# Patient Record
Sex: FEMALE | Race: WHITE
[De-identification: names, ages, dates, MRNs, and addresses within clinical notes are randomized per-mention and may not be internally consistent; named-entity substitution may affect disease eponyms.]

---

## 2017-06-02 ENCOUNTER — HOSPITAL ENCOUNTER (INPATIENT)
Dept: HOSPITAL 61 - ER | Age: 77
LOS: 11 days | Discharge: NURSING, REHAB OR LONG-TERM CARE | DRG: 682 | End: 2017-06-13
Attending: FAMILY MEDICINE | Admitting: FAMILY MEDICINE
Payer: MEDICARE

## 2017-06-02 VITALS — BODY MASS INDEX: 22.63 KG/M2 | HEIGHT: 60 IN | WEIGHT: 115.25 LBS

## 2017-06-02 VITALS — SYSTOLIC BLOOD PRESSURE: 108 MMHG | DIASTOLIC BLOOD PRESSURE: 43 MMHG

## 2017-06-02 VITALS — DIASTOLIC BLOOD PRESSURE: 46 MMHG | SYSTOLIC BLOOD PRESSURE: 110 MMHG

## 2017-06-02 VITALS — DIASTOLIC BLOOD PRESSURE: 36 MMHG | SYSTOLIC BLOOD PRESSURE: 87 MMHG

## 2017-06-02 DIAGNOSIS — J44.9: ICD-10-CM

## 2017-06-02 DIAGNOSIS — Z80.0: ICD-10-CM

## 2017-06-02 DIAGNOSIS — I08.3: ICD-10-CM

## 2017-06-02 DIAGNOSIS — Z90.710: ICD-10-CM

## 2017-06-02 DIAGNOSIS — I13.0: ICD-10-CM

## 2017-06-02 DIAGNOSIS — E86.0: ICD-10-CM

## 2017-06-02 DIAGNOSIS — H53.8: ICD-10-CM

## 2017-06-02 DIAGNOSIS — F32.9: ICD-10-CM

## 2017-06-02 DIAGNOSIS — J96.00: ICD-10-CM

## 2017-06-02 DIAGNOSIS — K52.9: ICD-10-CM

## 2017-06-02 DIAGNOSIS — Z88.5: ICD-10-CM

## 2017-06-02 DIAGNOSIS — Z90.49: ICD-10-CM

## 2017-06-02 DIAGNOSIS — Z88.1: ICD-10-CM

## 2017-06-02 DIAGNOSIS — I95.9: ICD-10-CM

## 2017-06-02 DIAGNOSIS — Z88.6: ICD-10-CM

## 2017-06-02 DIAGNOSIS — K21.9: ICD-10-CM

## 2017-06-02 DIAGNOSIS — Z91.09: ICD-10-CM

## 2017-06-02 DIAGNOSIS — Z96.651: ICD-10-CM

## 2017-06-02 DIAGNOSIS — I25.119: ICD-10-CM

## 2017-06-02 DIAGNOSIS — I27.2: ICD-10-CM

## 2017-06-02 DIAGNOSIS — I25.2: ICD-10-CM

## 2017-06-02 DIAGNOSIS — Z95.5: ICD-10-CM

## 2017-06-02 DIAGNOSIS — R47.81: ICD-10-CM

## 2017-06-02 DIAGNOSIS — N18.2: ICD-10-CM

## 2017-06-02 DIAGNOSIS — M81.0: ICD-10-CM

## 2017-06-02 DIAGNOSIS — F41.9: ICD-10-CM

## 2017-06-02 DIAGNOSIS — K86.1: ICD-10-CM

## 2017-06-02 DIAGNOSIS — E78.5: ICD-10-CM

## 2017-06-02 DIAGNOSIS — D64.9: ICD-10-CM

## 2017-06-02 DIAGNOSIS — N17.0: Primary | ICD-10-CM

## 2017-06-02 DIAGNOSIS — Z87.891: ICD-10-CM

## 2017-06-02 DIAGNOSIS — Z79.899: ICD-10-CM

## 2017-06-02 DIAGNOSIS — Z82.49: ICD-10-CM

## 2017-06-02 DIAGNOSIS — G90.50: ICD-10-CM

## 2017-06-02 DIAGNOSIS — I50.30: ICD-10-CM

## 2017-06-02 DIAGNOSIS — N39.0: ICD-10-CM

## 2017-06-02 DIAGNOSIS — Z88.8: ICD-10-CM

## 2017-06-02 DIAGNOSIS — K59.00: ICD-10-CM

## 2017-06-02 LAB
ALBUMIN SERPL-MCNC: 3.2 G/DL (ref 3.4–5)
ALP SERPL-CCNC: 157 U/L (ref 46–116)
ALT SERPL-CCNC: 29 U/L (ref 14–59)
ANION GAP SERPL CALC-SCNC: 17 MMOL/L (ref 6–14)
AST SERPL-CCNC: 25 U/L (ref 15–37)
BACTERIA #/AREA URNS HPF: (no result) /HPF
BASOPHILS # BLD AUTO: 0 X10^3/UL (ref 0–0.2)
BASOPHILS NFR BLD: 1 % (ref 0–3)
BILIRUB DIRECT SERPL-MCNC: 0.1 MG/DL (ref 0–0.2)
BILIRUB SERPL-MCNC: 0.4 MG/DL (ref 0.2–1)
BILIRUB UR QL STRIP: NEGATIVE
BUN SERPL-MCNC: 163 MG/DL (ref 7–20)
CALCIUM SERPL-MCNC: 9.1 MG/DL (ref 8.5–10.1)
CHLORIDE SERPL-SCNC: 103 MMOL/L (ref 98–107)
CK SERPL-CCNC: 49 U/L (ref 26–192)
CK SERPL-CCNC: 49 U/L (ref 26–192)
CKMB MASS: 2.2 NG/ML (ref 0–3.6)
CO2 SERPL-SCNC: 19 MMOL/L (ref 21–32)
CREAT SERPL-MCNC: 4.8 MG/DL (ref 0.6–1)
EOSINOPHIL NFR BLD: 3 % (ref 0–3)
ERYTHROCYTE [DISTWIDTH] IN BLOOD BY AUTOMATED COUNT: 14.5 % (ref 11.5–14.5)
GFR SERPLBLD BASED ON 1.73 SQ M-ARVRAT: 8.8 ML/MIN
GLUCOSE SERPL-MCNC: 94 MG/DL (ref 70–99)
GLUCOSE UR STRIP-MCNC: NEGATIVE MG/DL
HCT VFR BLD CALC: 39.7 % (ref 36–47)
HGB BLD-MCNC: 13.1 G/DL (ref 12–15.5)
LYMPHOCYTES # BLD: 1.2 X10^3/UL (ref 1–4.8)
LYMPHOCYTES NFR BLD AUTO: 20 % (ref 24–48)
MAGNESIUM SERPL-MCNC: 2.1 MG/DL (ref 1.8–2.4)
MCH RBC QN AUTO: 31 PG (ref 25–35)
MCHC RBC AUTO-ENTMCNC: 33 G/DL (ref 31–37)
MCV RBC AUTO: 95 FL (ref 79–100)
MONOCYTES NFR BLD: 12 % (ref 0–9)
NEUTROPHILS NFR BLD AUTO: 65 % (ref 31–73)
NITRITE UR QL STRIP: NEGATIVE
PH UR STRIP: 5 [PH]
PLATELET # BLD AUTO: 145 X10^3/UL (ref 140–400)
POTASSIUM SERPL-SCNC: 4.7 MMOL/L (ref 3.5–5.1)
PROT SERPL-MCNC: 6.9 G/DL (ref 6.4–8.2)
PROT UR STRIP-MCNC: NEGATIVE MG/DL
RBC # BLD AUTO: 4.18 X10^6/UL (ref 3.5–5.4)
RBC #/AREA URNS HPF: (no result) /HPF (ref 0–2)
SODIUM SERPL-SCNC: 139 MMOL/L (ref 136–145)
SP GR UR STRIP: 1.01
SQUAMOUS #/AREA URNS LPF: (no result) /LPF
UROBILINOGEN UR-MCNC: 0.2 MG/DL
WBC # BLD AUTO: 5.9 X10^3/UL (ref 4–11)

## 2017-06-02 PROCEDURE — 76705 ECHO EXAM OF ABDOMEN: CPT

## 2017-06-02 PROCEDURE — 81001 URINALYSIS AUTO W/SCOPE: CPT

## 2017-06-02 PROCEDURE — 83880 ASSAY OF NATRIURETIC PEPTIDE: CPT

## 2017-06-02 PROCEDURE — 88305 TISSUE EXAM BY PATHOLOGIST: CPT

## 2017-06-02 PROCEDURE — 71010: CPT

## 2017-06-02 PROCEDURE — 73502 X-RAY EXAM HIP UNI 2-3 VIEWS: CPT

## 2017-06-02 PROCEDURE — 80061 LIPID PANEL: CPT

## 2017-06-02 PROCEDURE — 93017 CV STRESS TEST TRACING ONLY: CPT

## 2017-06-02 PROCEDURE — 96374 THER/PROPH/DIAG INJ IV PUSH: CPT

## 2017-06-02 PROCEDURE — 83735 ASSAY OF MAGNESIUM: CPT

## 2017-06-02 PROCEDURE — 96360 HYDRATION IV INFUSION INIT: CPT

## 2017-06-02 PROCEDURE — 83690 ASSAY OF LIPASE: CPT

## 2017-06-02 PROCEDURE — A9500 TC99M SESTAMIBI: HCPCS

## 2017-06-02 PROCEDURE — 96375 TX/PRO/DX INJ NEW DRUG ADDON: CPT

## 2017-06-02 PROCEDURE — 82553 CREATINE MB FRACTION: CPT

## 2017-06-02 PROCEDURE — 87186 SC STD MICRODIL/AGAR DIL: CPT

## 2017-06-02 PROCEDURE — 78452 HT MUSCLE IMAGE SPECT MULT: CPT

## 2017-06-02 PROCEDURE — 36598 INJ W/FLUOR EVAL CV DEVICE: CPT

## 2017-06-02 PROCEDURE — 36415 COLL VENOUS BLD VENIPUNCTURE: CPT

## 2017-06-02 PROCEDURE — 85651 RBC SED RATE NONAUTOMATED: CPT

## 2017-06-02 PROCEDURE — 93005 ELECTROCARDIOGRAM TRACING: CPT

## 2017-06-02 PROCEDURE — A9585 GADOBUTROL INJECTION: HCPCS

## 2017-06-02 PROCEDURE — 85027 COMPLETE CBC AUTOMATED: CPT

## 2017-06-02 PROCEDURE — 87086 URINE CULTURE/COLONY COUNT: CPT

## 2017-06-02 PROCEDURE — 82550 ASSAY OF CK (CPK): CPT

## 2017-06-02 PROCEDURE — 80076 HEPATIC FUNCTION PANEL: CPT

## 2017-06-02 PROCEDURE — 93306 TTE W/DOPPLER COMPLETE: CPT

## 2017-06-02 PROCEDURE — 84484 ASSAY OF TROPONIN QUANT: CPT

## 2017-06-02 PROCEDURE — 96376 TX/PRO/DX INJ SAME DRUG ADON: CPT

## 2017-06-02 PROCEDURE — 80048 BASIC METABOLIC PNL TOTAL CA: CPT

## 2017-06-02 RX ADMIN — BACITRACIN SCH MLS/HR: 5000 INJECTION, POWDER, FOR SOLUTION INTRAMUSCULAR at 18:08

## 2017-06-02 RX ADMIN — ROPINIROLE HYDROCHLORIDE SCH MG: 0.25 TABLET, FILM COATED ORAL at 21:04

## 2017-06-02 RX ADMIN — ATENOLOL SCH MG: 25 TABLET ORAL at 21:05

## 2017-06-02 RX ADMIN — SODIUM CHLORIDE SCH MLS/HR: 450 INJECTION, SOLUTION INTRAVENOUS at 21:05

## 2017-06-02 NOTE — PHYS DOC
Past Medical History


Past Medical History:  CAD, Heart Disease, MI, Other


Past Surgical History:  Cholecystectomy, Hysterectomy, Knee Replacement, Lumbar 

Laminectomy, Tonsillectomy, Other


Additional Past Surgical Histo:  HEMORRHOIDECTOMY,SHOULDER,HERNIA, CARDIAC 

STENTS


Alcohol Use:  None


Drug Use:  None





Adult General


Chief Complaint


Chief Complaint:  CHEST PAIN





HPI


HPI





Patient is a pleasant 77-year-old female with a history of heart disease prior 

MI in 2014 with prior stenting history of hypertension hyperlipidemia who 

presents with chest pain that began about 2 hours prior to arrival. Patient has 

a history from was described of angina it is episodic in nature primarily in 

the center of her chest with radiation to the right and left breast to the 

right and left shoulders bilaterally. It does not make her short of breath but 

it makes her anxious. She does describe no nausea no vomiting, diarrhea, cough, 

chills, or URI symptoms. She does say that the pain is better with sublingual 

nitroglycerin which he took 2 of today to help with her symptoms. There is 

nothing new about these symptoms other than the fact that is more severe and 

longer lasting than normal. While at the MRI machine today getting her hip 

evaluated patient began having another episode. Although she is on oral 

oxycodone and fentanyl via patch patient is still having 7 of 10 pain although 

at presentation she seems very sedated with slurred speech secondary to 

narcotic interventions.


Differential diagnosis for chest pain: Pericarditis, myocarditis, endocarditis, 

pneumothorax, pneumonia, aortic dissection, esophageal spasm, esophagitis, 

peptic ulcer disease, acute coronary syndrome, mediastinitis, Boerhaave syndrome

, musculoskeletal chest wall pain, costochondritis, intercostal strain, rib 

fracture, pulmonary contusion, pneumonitis, pleural effusion, pericardial 

effusion, pericardial tamponode, and pleurisy. Considered upon arrival at this 

point it is likely unstable angina given symptoms occurring at rest and not 

changed in frequency and duration.


Patient EKG, chest x-ray, d-dimer, cardiac enzymes, CBC, CMP, IV fluids, 

aspirins were given my EMS in route nitrates also given by patient or to 

arrival.





Care physician is Dr. JIE SPAIN





Incidentally patient also had large lesion on her right hip that is concerning 

is variegated large one to 2 cm with erythema around it but is discolored 

variegated appearance been present for months.





Review of Systems


Review of Systems





Constitutional: Denies fever or chills []


Eyes: Denies change in visual acuity, redness, or eye pain []


HENT: Denies nasal congestion or sore throat []


Respiratory: Denies cough or shortness of breath []


Cardiovascular: No additional information not addressed in HPI []


GI: Denies abdominal pain, nausea, vomiting, bloody stools or diarrhea []


: Denies dysuria or hematuria []


Musculoskeletal: Denies back pain or joint pain []


Integument: No large lesion black in nature over the right hip.


Neurologic: Denies headache, she does complain of generalized weakness


Endocrine: Denies polyuria or polydipsia []





Current Medications


Current Medications





Current Medications








 Medications


  (Trade)  Dose


 Ordered  Sig/Laura  Start Time


 Stop Time Status Last Admin


Dose Admin


 


 Fentanyl Citrate


  (Fentanyl 2ml


 Vial)  50 mcg  PRN Q2HR  PRN  17 16:45


 6/3/17 16:44   


 


 


 Nitroglycerin


  (Nitrostat)  0.4 mg  PRN Q5MIN  PRN  17 16:45


 6/3/17 16:44   


 


 


 Nitroglycerin/


 Dextrose  250 ml @ 3


 mls/hr  1X  ONCE  17 16:15


 17 03:34   


 


 


 Ondansetron HCl


  (Zofran)  4 mg  PRN Q8HRS  PRN  17 16:45


 6/3/17 16:44   


 


 


 Sodium Chloride  1,000 ml @ 


 100 mls/hr  Q10H  17 16:43


 6/3/17 16:42   


 


 


 Sodium Chloride


  (Normal Saline


 Flush)  10 ml  QSHIFT  PRN  17 16:15


     


 











Allergies


Allergies





Allergies








Coded Allergies Type Severity Reaction Last Updated Verified


 


  diphenhydramine Allergy Severe Shortness of Air 16 Yes


 


  NSAIDS (Non-Steroidal Anti-Inflamma Allergy Intermediate All except Feldene 1/

5/15 Yes


 


  Pentazocine Lactate Allergy Intermediate Blood pressure increase, chest pain, 

heart races, itching. 1/5/15 Yes


 


  Trimethobenzamide HCl Allergy Intermediate Severe muscle spasms 1/5/15 Yes


 


  acetaminophen Allergy Intermediate Shallow breathing, slow heart rate, 

stomach pain 1/5/15 Yes


 


  adhesive Allergy Intermediate rash--"will explain." 1/5/15 Yes


 


  butorphanol tartrate Allergy Intermediate Hallucinations, HA, anxiety, rash 1/

5/15 Yes


 


  carisoprodol Allergy Intermediate Fainting, fast heart rate, light-headed 1/5/

15 Yes


 


  chlorzoxazone Allergy Intermediate Nausea, dizzy, rash, feels like I might 

pass out 1/5/15 Yes


 


  codeine Allergy Intermediate Shallow breathing, vomiting, severe HA, 

bradycardia, itching 1/5/15 Yes


 


  codeine phosphate Allergy Intermediate Shallow breathing, slow heart rate, 

stomach pain 1/5/15 Yes


 


  cyclobenzaprine HCl Allergy Intermediate Chest pain,HA, fast heart rate, 

nervous 1/5/15 Yes


 


  erythromycin base Allergy Intermediate Fainting, fast/irreg HR, rash, 

itiching 1/5/15 Yes


 


  hydromorphone HCl Allergy Intermediate Apneic 1/5/15 Yes


 


  iodine Allergy Intermediate Itching, skin rash, hives 1/5/15 Yes


 


  ketorolac tromethamine Allergy Intermediate Chest pain, sudden severe HA 1/5/

15 Yes


 


  levofloxacin Allergy Intermediate Severe dizziness, heart races, itching, 

stomach pain 1/5/15 Yes


 


  midazolam HCl Allergy Intermediate Slow heart rate, affects bld pressure, 

mild rash 1/5/15 Yes


 


  morphine Allergy Intermediate apneic 1/5/15 Yes


 


  nalbuphine HCl Allergy Intermediate Tachycardia, confusion, hallucinations, 

nervous 1/5/15 Yes


 


  I S O L A T I O N *CONTACT* Allergy Unknown  16 Yes











Physical Exam


Physical Exam





Constitutional: Patient mildly cachectic although well-developed there is some 

muscle wasting noted. Patient has poor dentition dry mucous membranes and is 

slurring her speech likely secondary to narcotics are on board.


HENT: Normocephalic, atraumatic, bilateral external ears normal, dry mucous 

membranes


Eyes: PERRLA, EOMI, conjunctiva normal, no discharge. [] 


Neck: Normal range of motion, no tenderness, supple, no stridor. [] 


Cardiovascular:Heart rate regular rhythm, no murmur []


Lungs & Thorax:  Bilateral breath sounds clear to auscultation []


Abdomen: Bowel sounds normal, soft, no tenderness, no masses, no pulsatile 

masses. [] 


Skin: As enlarged 1-2 cm enlarged dark irrigated brownish-black lesions that 

have mild erythema and induration surrounding them


Back: No tenderness, no CVA tenderness. [] 


Extremities: No tenderness, no cyanosis, no clubbing, ROM intact, no edema. [] 


Neurologic: Alert and oriented X 3, normal motor function, normal sensory 

function, no focal deficits noted. []


Psychologic: Affect normal, judgement normal, mood normal. []





Current Patient Data


Vital Signs





 Vital Signs








  Date Time  Temp Pulse Resp B/P (MAP) Pulse Ox O2 Delivery O2 Flow Rate FiO2


 


17 15:55 97.5 55 18 108/44 (65) 98 Nasal Cannula 2.0 





 97.5       








Lab Values





 Laboratory Tests








Test


  17


16:25 17


16:34 17


17:35


 


White Blood Count


  5.9 x10^3/uL


(4.0-11.0) 


  


 


 


Red Blood Count


  4.18 x10^6/uL


(3.50-5.40) 


  


 


 


Hemoglobin


  13.1 g/dL


(12.0-15.5) 


  


 


 


Hematocrit


  39.7 %


(36.0-47.0) 


  


 


 


Mean Corpuscular Volume


  95 fL ()


  


  


 


 


Mean Corpuscular Hemoglobin 31 pg (25-35)    


 


Mean Corpuscular Hemoglobin


Concent 33 g/dL


(31-37) 


  


 


 


Red Cell Distribution Width


  14.5 %


(11.5-14.5) 


  


 


 


Platelet Count


  145 x10^3/uL


(140-400) 


  


 


 


Neutrophils (%) (Auto) 65 % (31-73)    


 


Lymphocytes (%) (Auto) 20 % (24-48)  L  


 


Monocytes (%) (Auto) 12 % (0-9)  H  


 


Eosinophils (%) (Auto) 3 % (0-3)    


 


Basophils (%) (Auto) 1 % (0-3)    


 


Neutrophils # (Auto)


  3.9 x10^3uL


(1.8-7.7) 


  


 


 


Lymphocytes # (Auto)


  1.2 x10^3/uL


(1.0-4.8) 


  


 


 


Monocytes # (Auto)


  0.7 x10^3/uL


(0.0-1.1) 


  


 


 


Eosinophils # (Auto)


  0.1 x10^3/uL


(0.0-0.7) 


  


 


 


Basophils # (Auto)


  0.0 x10^3/uL


(0.0-0.2) 


  


 


 


Sodium Level


  139 mmol/L


(136-145) 


  


 


 


Potassium Level


  4.7 mmol/L


(3.5-5.1) 


  


 


 


Chloride Level


  103 mmol/L


() 


  


 


 


Carbon Dioxide Level


  19 mmol/L


(21-32)  L 


  


 


 


Anion Gap 17 (6-14)  H  


 


Blood Urea Nitrogen


  163 mg/dL


(7-20)  H 


  


 


 


Creatinine


  4.8 mg/dL


(0.6-1.0)  H 


  


 


 


Estimated GFR


(Cockcroft-Gault) 8.8  


  


  


 


 


Glucose Level


  94 mg/dL


(70-99) 


  


 


 


Calcium Level


  9.1 mg/dL


(8.5-10.1) 


  


 


 


Magnesium Level


  2.1 mg/dL


(1.8-2.4) 


  


 


 


Total Bilirubin


  0.4 mg/dL


(0.2-1.0) 


  


 


 


Direct Bilirubin


  0.1 mg/dL


(0.0-0.2) 


  


 


 


Aspartate Amino Transferase


(AST) 25 U/L (15-37)


  


  


 


 


Alanine Aminotransferase (ALT)


  29 U/L (14-59)


  


  


 


 


Alkaline Phosphatase


  157 U/L


()  H 


  


 


 


Creatine Kinase


  49 U/L


() 


  


 


 


Creatine Kinase MB (Mass)


  2.2 ng/mL


(0.0-3.6) 


  


 


 


Creatine Kinase MB Relative


Index  % (0-4)  


  


  


 


 


Troponin I Quantitative


  < 0.017 ng/mL


(0.000-0.055) 


  


 


 


NT-Pro-B-Type Natriuretic


Peptide 1252 pg/mL


(0-449)  H 


  


 


 


Total Protein


  6.9 g/dL


(6.4-8.2) 


  


 


 


Albumin


  3.2 g/dL


(3.4-5.0)  L 


  


 


 


Lipase


  599 U/L


()  H 


  


 


 


POC Troponin I


  


  0.01 ng/ml


(<0.08) 


 


 


Urine Collection Type   Unknown  


 


Urine Color   Yellow  


 


Urine Clarity   Clear  


 


Urine pH   5.0  


 


Urine Specific Gravity   1.015  


 


Urine Protein


  


  


  Negative mg/dL


(NEG-TRACE)


 


Urine Glucose (UA)


  


  


  Negative mg/dL


(NEG)


 


Urine Ketones (Stick)


  


  


  Negative mg/dL


(NEG)


 


Urine Blood


  


  


  Negative (NEG)


 


 


Urine Nitrite


  


  


  Negative (NEG)


 


 


Urine Bilirubin


  


  


  Negative (NEG)


 


 


Urine Urobilinogen Dipstick


  


  


  0.2 mg/dL (0.2


mg/dL)


 


Urine Leukocyte Esterase   Small (NEG)  


 


Urine RBC


  


  


  1-2 /HPF (0-2)


 


 


Urine WBC


  


  


  11-20 /HPF


(0-4)


 


Urine Squamous Epithelial


Cells 


  


  Few /LPF  


 


 


Urine Bacteria


  


  


  Few /HPF


(0-FEW)


 


Urine Hyaline Casts


  


  


  Occasional


/HPF





 Laboratory Tests


17 16:25








 Laboratory Tests


17 16:25











EKG


EKG


[] KG timed 1553 2017 patient's EKG demonstrated heart rate of 55 sinus 

bradycardia there is marked movement artifact secondary to find motor and 

patient the patient has a Q-wave in the inferior leads in V1 and V2 which is 

not new and old there is also a left ventricular bundle branch block with 

intraventricular conduction delay noted on old EKGs there is left axis 

deviation as well. There are no new changes to this EKG.





Radiology/Procedures


Radiology/Procedures


[] [facility]


 [address] [place] [address]


 [phone]


 


 IMAGING REPORT





 Signed





PATIENT: DELON MARAVILLA ACCOUNT: [id number] MRN#: [id number]


: 1940 LOCATION: [address] [place] [address] AGE: 76


SEX: F EXAM DT: 16 ACCESSION#: [id number]


STATUS: ADM IN ORD. PHYSICIAN: CALVIN NAVARRO MD 


REASON: CHF


PROCEDURE: ECHOCARDIOGRAM











--------------- APPROVED REPORT --------------








EXAM: Two-dimensional and M-mode echocardiogram with Doppler and color Doppler.





Other Information 


Quality : Good   





INDICATION


Congestive Heart Failure 





2D DIMENSIONS 


RVDd   2.8 (2.9-3.5cm)   Left Atrium(2D)   3.0 (1.6-4.0cm)


IVSd   1.0 (0.7-1.1cm)   Aortic Root(2D)   1.9 (2.0-3.7cm)


LVDd   3.5 (3.9-5.9cm)   LVOT Diameter   1.9 (1.8-2.4cm)


PWd   0.9 (0.7-1.1cm)   LVDs      2.2 (2.5-4.0cm)


FS (%)    30.0 %      SV      33.3 ml


LVEF(%)   60.0 (>50%)         





Aortic Valve


AoV Peak Yifan.   155.2cm/s   AoV VTI      37.9cm


AO Peak GR.   9.6mmHg      LVOT Peak Yifan.   123.9cm/s


AO Mean GR.   5mmHg      NANCY (VMAX)   2.34cm2


NANCY   (VTI)   2.50cm2      AI P 1/2 Time   376ms





Mitral Valve


MV E Velocity   108.5cm/s   MV DECEL TIME   124ms


MV A Velocity   95.5cm/s   E/A  Ratio   1.1





Tricuspid Valve


TR P. Velocity   356cm/s   RAP ESTIMATE   10mmHg


TR Peak Gr.   51mmHg   RVSP      61mmHg





Pulmonary Vein


S1 Velocity   57.6cm/s   D2 Velocity   44.7cm/s


PVa duration   145msec            





 LEFT VENTRICLE 


The left ventricle is mildly dilated There is mild concentric left ventricular 

hypertrophy The left ventricular systolic function is normal and the ejection 

fraction is within normal range. The Ejection Fraction is 60%. There is mild 

hypokinesis in the apical inferior wall. Transmitral Doppler flow pattern is 

Grade II-pseudonormal filling dynamics. Left ventricular diastolic dysfunction





 RIGHT VENTRICLE 


The right ventricle is normal size. Mild right ventricular hypertrophy The 

right ventricular systolic function is normal.





 ATRIA 


The left atrium size is dilated The right atrium size is dilated The 

interatrial septum is intact with no evidence for an atrial septal defect or 

patent foramen ovale as noted on 2-D or Doppler imaging.





 AORTIC VALVE 


The aortic valve is calcified but opens well. Doppler and Color Flow revealed 

moderate to severe aortic regurgitation. There is no significant aortic 

valvular stenosis.





 MITRAL VALVE 


The mitral valve is normal in structure There is no evidence of mitral valve 

prolapse. There is no mitral valve stenosis. Doppler and Color-flow revealed 

mild to moderate mitral regurgitation.





 TRICUSPID VALVE 


The tricuspid valve is normal in structure and function. Doppler and Color Flow 

revealed moderate tricuspid regurgitation. There is moderate to severe 

pulmonary hypertension. The PA pressure was estimated at 61 mmHg. There is no 

tricuspid valve stenosis.





 PULMONIC VALVE 


The pulmonary valve is normal in structure Doppler and Color Flow revealed mild 

pulmonic valvular regurgitation. There is no pulmonic valvular stenosis.





 GREAT VESSELS 


The aortic root is normal in size. The ascending aorta is normal in size. The 

IVC is normal in size and collapses >50% with inspiration.





 PERICARDIAL EFFUSION 


There is no evidence of significant pericardial effusion.





Critical Notification


Critical Value: No





<Conclusion>


The left ventricular systolic function is normal and the ejection fraction is 

within normal range.


The Ejection Fraction is 60%.


Transmitral Doppler flow pattern is Grade II-pseudonormal filling dynamics. 

Left ventricular diastolic dysfunction


Mild right ventricular hypertrophy


The left atrium size is dilated


The right atrium size is dilated


Doppler and Color Flow revealed moderate to severe aortic regurgitation.


The aortic valve is calcified but opens well.


Doppler and Color-flow revealed mild to moderate mitral regurgitation.


Doppler and Color Flow revealed moderate tricuspid regurgitation.


There is moderate to severe pulmonary hypertension.


The PA pressure was estimated at 61 mmHg.


Doppler and Color Flow revealed mild pulmonic valvular regurgitation.


There is no evidence of significant pericardial effusion.














DICTATED and SIGNED BY:     CALVIN NAVARRO MD


DATE:     12/10/16 1426





CC: KRISTIN CHEN MD; CALVIN NAVARRO MD ~





Course & Med Decision Making


Course & Med Decision Making


Pertinent Labs and Imaging studies reviewed. (See chart for details)





Initial presentation Differential diagnosis for chest pain: Pericarditis, 

myocarditis, endocarditis, pneumothorax, pneumonia, aortic dissection, 

esophageal spasm, esophagitis, peptic ulcer disease, acute coronary syndrome, 

mediastinitis, Boerhaave syndrome, musculoskeletal chest wall pain, 

costochondritis, intercostal strain, rib fracture, pulmonary contusion, 

pneumonitis, pleural effusion, pericardial effusion, pericardial tamponode, and 

pleurisy





Disposition also demonstrated a small lesion on her right hip which is very 

concerning for melanoma.





Consultant note: Internal medicine





Consultant called at of the service  4:30 PM


Consult called back at  o435 p.m.





Discussed the case I presented and they agreed with admission. Time of 

acceptance for 435 PM ask for inpatient admission cardiology evaluation and 

consultation. 





Patient's pain is intermittent and comes and goes patient is mildly 

hypertensive during her evaluation here in the emergency department although 

she has fentanyl on which is likely causing her hypotension the nitrates may be 

doing so. Given her continued discomfort she's been bitten to the hospital 

under the impression that she has angina. This is chronic for her not a new 

nature but given her symptoms I wanted to make sure that cardiology was aware 

of her presence. Also on her findings were a elevated BUN/creatinine a 

significant value of 4.8 this time I have no old findings to compare this to 

but I worried that this on top her chest pain is concerning. It is also noted 

that her pancreas may be inflamed and irritated from some unknown reason 

perhaps hyperlipidemia, as her lipase is 599 today which would also explain why 

she has abdominal pain when is in not responsive to nitrates as normal.  Her 

troponin initially on arrival was 0.01 and measured 0.017 which both within 

normal limits. I believe that an ultrasound of her abdomen would be appropriate 

to rule out other sources of abdominal catastrophe causing pancreatic tightness 

like cholelithiasis or cholecystitis. Her LFTs are not elevated white count. I 

concern also would be possible alcohol consumption although there is no history 

of acid of alcoholism





With a course this patient's evaluation she was comfortable and resting quietly 

mildly hypotensive responsive to fluids. Denies still concerned that possibly 

fluid retention with elevated pancreatic enzymes may be the cause of her 

abdominal pain chest pain she described earlier. Dr. Chen this patient 

approximately 6:10 PM to let him know about the other findings during her 

course of evaluation and my plan of treatment.  I will cancel her diet and keep 

her NPO and encourage GI valuation. 





 impression: Abdominal pain, likely pancreatitis, chest pain unclear etiology 

likely angina, hypotension likely secondary to medication reaction. Renal 

failure





Disposition: Admission to the hospital under internal medicine service Dr. JIE SPAIN with cardiology and GI consultation.





Dragon Disclaimer


Dragon Disclaimer


This electronic medical record was generated, in whole or in part, using a 

voice recognition dictation system.





Departure


Departure


Impression:  


 Primary Impression:  


 Angina at rest


 Additional Impressions:  


 Chest pain


 Melanoma


 Acute renal failure


 Pancreatitis


Disposition:   ADMITTED AS INPATIENT


Admitting Physician:  Kristin Chen


Referrals:  


KRISTIN CHEN MD (PCP)





Problem Qualifiers











DAVID ROWE MD 2017 16:11

## 2017-06-02 NOTE — RAD
INDICATION: pt c/o chest pain today, elevated lipase

 

COMPARISON: None.

 

TECHNIQUE: Grayscale and color ultrasound images obtained through the 

abdomen.

 

FINDINGS:

 

Aorta/IVC: Incomplete visualization.

Pancreas: Not well seen

Liver: Mild prominence of intrahepatic bile ducts

Gallbladder: Removed

Common Bile Duct: 11 mm

Right Kidney: No hydronephrosis.

 

 

IMPRESSION: 

 

Postcholecystectomy with prominent intrahepatic and extrahepatic bile 

ducts. This is commonly seen postcholecystectomy but would correlate with 

symptoms and lab markers to ensure there is not a distal obstructive 

process.

 

Electronically signed by: Art Wright MD (6/2/2017 7:16 PM)

## 2017-06-02 NOTE — ACF
Admission Forms Criteria





                            ANGINA





Clinical Indications for Admission to Inpatient Care





                                                                    (Place 'X' 

for any and all applicable criteria):





Admission is indicated for suspected angina (e.g, chest pain pattern, angina-

equivalent symptom, or other finding suggesting 


unstable angina) with ANY ONE of the following(1)(2)(3)(4)(5): 


[X]I.   Angina needing acute intervention as indicated by ALL of the following (

11)(12):


        [X]a)  Unstable angina is present as indicated by angina that is ANY ONE

 of the following:


                [ ]i)     New onset


                [ ]ii)    Nocturnal


                [X]iii)   Prolonged at rest 


                [ ]iv)   Progressive


         [X]b)  Angina warrants acute intervention as indicated by ANY ONE of 

the following:


                [ ]i)     Recurrent angina (e.g, not responding as previously 

to treatment)


                [ ]ii)     Angina at rest or with low-level activities despite 

initial medical therapy


                [ ]iii)    New or presumably new ST-segment depression on ECG


                [ ]iv)    Signs or symptoms of heart failure (eg, dyspnea, 

pulmonary edema)


                [ ]v)     New or worsening mitral regurgitation


                [ ]vi)    Hemodynamic instability


                [ ]vii)    Dangerous arrhythmia (eg, sustained ventricular 

tachycardia)          


                [ ]viii)   History of percutaneous coronary intervention within 

6 months          


                [ ]ix)    History of coronary artery bypass graft surgery


                [X]x)    SAVANA risk score of 2 or greater[A]


                [ ]xi)    History of Diabetes(14)


                [ ]xii)   High-risk cardiac ischemia findings on noninvasive 

testing (e.g, echocardiogram,


                          treadmill testing, nuclear scan)


                [ ]xiii)  Chronic renal insufficiency (ie, estimated GFR less 

than 60 mL/min/1.732m)


                [ ]xiv)   Left ventricular ejection fraction less than 40%


[ ]II.   Evidence of MI (e.g, cardiac biomarkers positive, ST-segment elevation 

on ECG). Also use Myocardial Infarction. 





                                                                               

                   


Extended stay beyond goal length of stay may be needed for (1)(26):


[ ]a)  Intravascular procedural complications such as acute vessel closure, 

stent malposition, or vessel dissection(27) 


[ ]b)  Extravascular procedural complications such as retroperitoneal hematoma, 

pericardial effusion, or cardiac tamponade 


[ ]c)  Entry site complications causing bleeding, hematoma, or distal ischemia 

and requiring ongoing monitoring, 


        surgical repair, or surgical  thrombectomy(28) 


[ ]d)  Heart failure            


[ ]e)  Dangerous arrhythmia           


[ ]f)   Hemodynamic instability with persisting symptoms after intensive 

medical management, or recurring severe 


        prolonged symptoms


[ ]g)  Postprocedural myocardial infarction or acute renal failure











The original [facility] [employer] content created by [employer] [employer] has been revised.


The portions of the content which have been revised are identified through the 

use of italic text or in bold, and [facility] 


has neither reviewed nor approved the modified material. All other unmodified 

content is copyright  [facility].





Please see references footnoted in the original [employer] [facility] edition 

2016





Admission Criteria Met?:  Yes











LEANDRO BURTON Jun 2, 2017 17:09

## 2017-06-02 NOTE — RAD
Indication weakness shortness of breath chest pain.



A single view of the chest was obtained. Comparison is made to an examination

12/13/2016.



There are chronic interstitial changes. Heart size is unchanged. There is no

gross congestive heart failure. A focal process is not seen. Significant

pleural fluid is not present and there is no pneumothorax. A left Port-A-Cath

is noted. There has not been a significant change compared to the previous

exam.



IMPRESSION: Chronic changes. No acute finding. No significant change

## 2017-06-02 NOTE — EKG
[facility]

              [address] [place] [address]

Test Date:    2017               Test Time:    15:53:39

Pat Name:     DELON MARAVILLA              Department:   

Patient ID:   [id number]           Room:          

Gender:       F                        Technician:   

:          [id number]               Requested By: DAVID ROWE

Order Number: [id number]            Reading MD:   Maynor Mendes

                                 Measurements

Intervals                              Axis          

Rate:         55                       P:            66

MS:           180                      QRS:          -47

QRSD:         166                      T:            90

QT:           484                                    

QTc:          465                                    

                           Interpretive Statements

SINUS RHYTHM

LEFT ATRIAL ABNORMALITY

ABNORMAL LEFT AXIS DEVIATION

NON SPECIFIC INTRAVENTRICULAR BLOCK

QRS(T) CONTOUR ABNORMALITY

CONSISTENT WITH ANTEROSEPTAL INFARCT

AGE UNDETERMINED

RI6.01          Unconfirmed report



Electronically Signed On 2017 9:34:01 CDT by Maynor Mendes

## 2017-06-03 VITALS — SYSTOLIC BLOOD PRESSURE: 128 MMHG | DIASTOLIC BLOOD PRESSURE: 45 MMHG

## 2017-06-03 VITALS — SYSTOLIC BLOOD PRESSURE: 104 MMHG | DIASTOLIC BLOOD PRESSURE: 33 MMHG

## 2017-06-03 VITALS — SYSTOLIC BLOOD PRESSURE: 82 MMHG | DIASTOLIC BLOOD PRESSURE: 41 MMHG

## 2017-06-03 VITALS — SYSTOLIC BLOOD PRESSURE: 104 MMHG | DIASTOLIC BLOOD PRESSURE: 40 MMHG

## 2017-06-03 VITALS — DIASTOLIC BLOOD PRESSURE: 31 MMHG | SYSTOLIC BLOOD PRESSURE: 91 MMHG

## 2017-06-03 VITALS — SYSTOLIC BLOOD PRESSURE: 98 MMHG | DIASTOLIC BLOOD PRESSURE: 30 MMHG

## 2017-06-03 LAB
ANION GAP SERPL CALC-SCNC: 12 MMOL/L (ref 6–14)
BUN SERPL-MCNC: 142 MG/DL (ref 7–20)
CALCIUM SERPL-MCNC: 8.4 MG/DL (ref 8.5–10.1)
CHLORIDE SERPL-SCNC: 110 MMOL/L (ref 98–107)
CO2 SERPL-SCNC: 20 MMOL/L (ref 21–32)
CREAT SERPL-MCNC: 3.7 MG/DL (ref 0.6–1)
GFR SERPLBLD BASED ON 1.73 SQ M-ARVRAT: 11.8 ML/MIN
GLUCOSE SERPL-MCNC: 101 MG/DL (ref 70–99)
POTASSIUM SERPL-SCNC: 4.1 MMOL/L (ref 3.5–5.1)
SODIUM SERPL-SCNC: 142 MMOL/L (ref 136–145)

## 2017-06-03 RX ADMIN — ATENOLOL SCH MG: 25 TABLET ORAL at 20:35

## 2017-06-03 RX ADMIN — ISOSORBIDE MONONITRATE SCH MG: 30 TABLET, EXTENDED RELEASE ORAL at 09:00

## 2017-06-03 RX ADMIN — OXYCODONE HYDROCHLORIDE AND ACETAMINOPHEN PRN TAB: 10; 325 TABLET ORAL at 09:19

## 2017-06-03 RX ADMIN — APIXABAN SCH MG: 2.5 TABLET, FILM COATED ORAL at 20:37

## 2017-06-03 RX ADMIN — LISINOPRIL SCH MG: 10 TABLET ORAL at 09:00

## 2017-06-03 RX ADMIN — ROPINIROLE HYDROCHLORIDE SCH MG: 0.25 TABLET, FILM COATED ORAL at 20:37

## 2017-06-03 RX ADMIN — AMIODARONE HYDROCHLORIDE SCH MG: 200 TABLET ORAL at 09:19

## 2017-06-03 RX ADMIN — OXYCODONE HYDROCHLORIDE AND ACETAMINOPHEN PRN TAB: 10; 325 TABLET ORAL at 10:19

## 2017-06-03 RX ADMIN — APIXABAN SCH MG: 2.5 TABLET, FILM COATED ORAL at 12:46

## 2017-06-03 RX ADMIN — BACITRACIN SCH MLS/HR: 5000 INJECTION, POWDER, FOR SOLUTION INTRAMUSCULAR at 02:43

## 2017-06-03 RX ADMIN — BACITRACIN SCH MLS/HR: 5000 INJECTION, POWDER, FOR SOLUTION INTRAMUSCULAR at 12:40

## 2017-06-03 RX ADMIN — AMIODARONE HYDROCHLORIDE SCH MG: 200 TABLET ORAL at 09:03

## 2017-06-03 RX ADMIN — ATENOLOL SCH MG: 25 TABLET ORAL at 09:00

## 2017-06-03 RX ADMIN — OXYCODONE HYDROCHLORIDE AND ACETAMINOPHEN PRN TAB: 10; 325 TABLET ORAL at 20:37

## 2017-06-03 RX ADMIN — SODIUM CHLORIDE SCH MLS/HR: 450 INJECTION, SOLUTION INTRAVENOUS at 15:38

## 2017-06-03 NOTE — PDOC
GENERAL


General:


see dictated H&P.


Problems:  





VITAL SIGNS


Vital Signs:





Vital Signs








  Date Time  Temp Pulse Resp B/P (MAP) Pulse Ox O2 Delivery O2 Flow Rate FiO2


 


6/3/17 10:45 97.5 54 18 91/31 (51) 95 Room Air  





 97.5       


 


6/3/17 09:19       2.0 











I & O


I & O











Intake and Output 


 


 6/3/17





 07:00


 


Intake Total 1658 ml


 


Balance 1658 ml


 


 


 


Intake Oral 0 ml


 


IV Total 1658 ml


 


# Voids 1











ALLERGIES


Allergies:





Allergies








Coded Allergies Type Severity Reaction Last Updated Verified


 


  diphenhydramine Allergy Severe Shortness of Air 6/1/16 Yes


 


  NSAIDS (Non-Steroidal Anti-Inflamma Allergy Intermediate All except Feldene 1/

5/15 Yes


 


  Pentazocine Lactate Allergy Intermediate Blood pressure increase, chest pain, 

heart races, itching. 1/5/15 Yes


 


  Trimethobenzamide HCl Allergy Intermediate Severe muscle spasms 1/5/15 Yes


 


  acetaminophen Allergy Intermediate Shallow breathing, slow heart rate, 

stomach pain 1/5/15 Yes


 


  adhesive Allergy Intermediate rash--"will explain." 1/5/15 Yes


 


  butorphanol tartrate Allergy Intermediate Hallucinations, HA, anxiety, rash 1/

5/15 Yes


 


  carisoprodol Allergy Intermediate Fainting, fast heart rate, light-headed 1/5/

15 Yes


 


  chlorzoxazone Allergy Intermediate Nausea, dizzy, rash, feels like I might 

pass out 1/5/15 Yes


 


  codeine Allergy Intermediate Shallow breathing, vomiting, severe HA, 

bradycardia, itching 1/5/15 Yes


 


  codeine phosphate Allergy Intermediate Shallow breathing, slow heart rate, 

stomach pain 1/5/15 Yes


 


  cyclobenzaprine HCl Allergy Intermediate Chest pain,HA, fast heart rate, 

nervous 1/5/15 Yes


 


  erythromycin base Allergy Intermediate Fainting, fast/irreg HR, rash, 

itiching 1/5/15 Yes


 


  hydromorphone HCl Allergy Intermediate Apneic 1/5/15 Yes


 


  iodine Allergy Intermediate Itching, skin rash, hives 1/5/15 Yes


 


  ketorolac tromethamine Allergy Intermediate Chest pain, sudden severe HA 1/5/

15 Yes


 


  levofloxacin Allergy Intermediate Severe dizziness, heart races, itching, 

stomach pain 1/5/15 Yes


 


  midazolam HCl Allergy Intermediate Slow heart rate, affects bld pressure, 

mild rash 1/5/15 Yes


 


  morphine Allergy Intermediate apneic 1/5/15 Yes


 


  nalbuphine HCl Allergy Intermediate Tachycardia, confusion, hallucinations, 

nervous 1/5/15 Yes


 


  I S O L A T I O N *CONTACT* Allergy Unknown  12/8/16 Yes











MEDS


Medications:





Current Medications








 Medications


  (Trade)  Dose


 Ordered  Sig/Laura  Start Time


 Stop Time Status Last Admin


Dose Admin


 


 Amiodarone HCl


  (Cordarone)  200 mg  DAILY  6/3/17 09:00


    6/3/17 09:19


200 MG


 


 Apixaban


  (Eliquis)  2.5 mg  BID  6/3/17 11:00


     


 


 


 Atenolol


  (Tenormin)  25 mg  BID  6/2/17 21:00


    6/2/17 21:05


25 MG


 


 Fentanyl


  (Duragesic 50mcg/


 Hr Patch)  1 patch  Q3DAYS  6/5/17 09:00


     


 


 


 Fentanyl Citrate


  (Fentanyl 2ml


 Vial)  50 mcg  PRN Q2HR  PRN  6/2/17 16:45


 6/3/17 16:44   


 


 


 Info


  (Anti-Coagulation


 Monitoring By


 Pharmacy)  1 each  PRN DAILY  PRN  6/3/17 10:45


     


 


 


 Isosorbide


 Mononitrate


  (Imdur)  30 mg  DAILY  6/3/17 09:00


     


 


 


 Lisinopril


  (Prinivil)  10 mg  DAILY  6/3/17 09:00


     


 


 


 Nitroglycerin


  (Nitrostat)  0.4 mg  PRN Q5MIN  PRN  6/2/17 20:30


     


 


 


 Nitroglycerin/


 Dextrose  250 ml @ 3


 mls/hr  1X  ONCE  6/2/17 16:15


 6/6/17 03:34   


 


 


 Non-Formulary


 Medication  50 mg  TID  PRN  6/2/17 20:30


   UNV  


 


 


 Ondansetron HCl


  (Zofran Odt)  4 mg  PRN Q6HRS  PRN  6/2/17 20:30


     


 


 


 Ondansetron HCl


  (Zofran)  4 mg  PRN Q8HRS  PRN  6/2/17 16:45


 6/3/17 16:44   


 


 


 Oxycodone/


 Acetaminophen


  (Percocet 10/325)  1 tab  PRN QID  PRN  6/2/17 20:30


    6/3/17 09:19


1 TAB


 


 Rivaroxaban


  (Xarelto)  15 mg  DAILYWSUP  6/3/17 17:00


   UNV  


 


 


 Ropinirole HCl


  (Requip)  0.25 mg  QHS  6/2/17 21:00


    6/2/17 21:04


0.25 MG


 


 Sodium Chloride  1,000 ml @ 


 50 mls/hr  Q20H  6/2/17 20:30


    6/2/17 21:05


50 MLS/HR


 


 Sodium Chloride


  (Normal Saline


 Flush)  10 ml  QSHIFT  PRN  6/2/17 16:15


     


 











LAB


Lab:





Laboratory Tests








Test


  6/2/17


16:25 6/2/17


16:34 6/2/17


17:35 6/2/17


23:58


 


White Blood Count


  5.9 x10^3/uL


(4.0-11.0) 


  


  


 


 


Red Blood Count


  4.18 x10^6/uL


(3.50-5.40) 


  


  


 


 


Hemoglobin


  13.1 g/dL


(12.0-15.5) 


  


  


 


 


Hematocrit


  39.7 %


(36.0-47.0) 


  


  


 


 


Mean Corpuscular Volume 95 fL ()    


 


Mean Corpuscular Hemoglobin 31 pg (25-35)    


 


Mean Corpuscular Hemoglobin


Concent 33 g/dL


(31-37) 


  


  


 


 


Red Cell Distribution Width


  14.5 %


(11.5-14.5) 


  


  


 


 


Platelet Count


  145 x10^3/uL


(140-400) 


  


  


 


 


Neutrophils (%) (Auto) 65 % (31-73)    


 


Lymphocytes (%) (Auto) 20 % (24-48)    


 


Monocytes (%) (Auto) 12 % (0-9)    


 


Eosinophils (%) (Auto) 3 % (0-3)    


 


Basophils (%) (Auto) 1 % (0-3)    


 


Neutrophils # (Auto)


  3.9 x10^3uL


(1.8-7.7) 


  


  


 


 


Lymphocytes # (Auto)


  1.2 x10^3/uL


(1.0-4.8) 


  


  


 


 


Monocytes # (Auto)


  0.7 x10^3/uL


(0.0-1.1) 


  


  


 


 


Eosinophils # (Auto)


  0.1 x10^3/uL


(0.0-0.7) 


  


  


 


 


Basophils # (Auto)


  0.0 x10^3/uL


(0.0-0.2) 


  


  


 


 


Sodium Level


  139 mmol/L


(136-145) 


  


  


 


 


Potassium Level


  4.7 mmol/L


(3.5-5.1) 


  


  


 


 


Chloride Level


  103 mmol/L


() 


  


  


 


 


Carbon Dioxide Level


  19 mmol/L


(21-32) 


  


  


 


 


Anion Gap 17 (6-14)    


 


Blood Urea Nitrogen


  163 mg/dL


(7-20) 


  


  


 


 


Creatinine


  4.8 mg/dL


(0.6-1.0) 


  


  


 


 


Estimated GFR


(Cockcroft-Gault) 8.8 


  


  


  


 


 


Glucose Level


  94 mg/dL


(70-99) 


  


  


 


 


Calcium Level


  9.1 mg/dL


(8.5-10.1) 


  


  


 


 


Magnesium Level


  2.1 mg/dL


(1.8-2.4) 


  


  


 


 


Total Bilirubin


  0.4 mg/dL


(0.2-1.0) 


  


  


 


 


Direct Bilirubin


  0.1 mg/dL


(0.0-0.2) 


  


  


 


 


Aspartate Amino Transf


(AST/SGOT) 25 U/L (15-37) 


  


  


  


 


 


Alanine Aminotransferase


(ALT/SGPT) 29 U/L (14-59) 


  


  


  


 


 


Alkaline Phosphatase


  157 U/L


() 


  


  


 


 


Creatine Kinase


  49 U/L


() 


  


  


 


 


Creatine Kinase MB (Mass)


  2.2 ng/mL


(0.0-3.6) 


  


  


 


 


Creatine Kinase MB Relative


Index  % (0-4) 


  


  


  


 


 


Troponin I Quantitative


  < 0.017 ng/mL


(0.000-0.055) 


  


  < 0.017 ng/mL


(0.000-0.055)


 


NT-Pro-B-Type Natriuretic


Peptide 1252 pg/mL


(0-449) 


  


  


 


 


Total Protein


  6.9 g/dL


(6.4-8.2) 


  


  


 


 


Albumin


  3.2 g/dL


(3.4-5.0) 


  


  


 


 


Lipase


  599 U/L


() 


  


  


 


 


Bedside Troponin I


  


  0.01 ng/ml


(<0.08) 


  


 


 


Urine Collection Type   Unknown  


 


Urine Color   Yellow  


 


Urine Clarity   Clear  


 


Urine pH   5.0  


 


Urine Specific Gravity   1.015  


 


Urine Protein


  


  


  Negative mg/dL


(NEG-TRACE) 


 


 


Urine Glucose (UA)


  


  


  Negative mg/dL


(NEG) 


 


 


Urine Ketones (Stick)


  


  


  Negative mg/dL


(NEG) 


 


 


Urine Blood   Negative (NEG)  


 


Urine Nitrite   Negative (NEG)  


 


Urine Bilirubin   Negative (NEG)  


 


Urine Urobilinogen Dipstick


  


  


  0.2 mg/dL (0.2


mg/dL) 


 


 


Urine Leukocyte Esterase   Small (NEG)  


 


Urine RBC   1-2 /HPF (0-2)  


 


Urine WBC


  


  


  11-20 /HPF


(0-4) 


 


 


Urine Squamous Epithelial


Cells 


  


  Few /LPF 


  


 


 


Urine Bacteria


  


  


  Few /HPF


(0-FEW) 


 


 


Urine Hyaline Casts


  


  


  Occasional


/HPF 


 


 


Test


  6/3/17


04:50 


  


  


 


 


Erythrocyte Sedimentation Rate 7 (0-25)    


 


Sodium Level


  142 mmol/L


(136-145) 


  


  


 


 


Potassium Level


  4.1 mmol/L


(3.5-5.1) 


  


  


 


 


Chloride Level


  110 mmol/L


() 


  


  


 


 


Carbon Dioxide Level


  20 mmol/L


(21-32) 


  


  


 


 


Anion Gap 12 (6-14)    


 


Blood Urea Nitrogen


  142 mg/dL


(7-20) 


  


  


 


 


Creatinine


  3.7 mg/dL


(0.6-1.0) 


  


  


 


 


Estimated GFR


(Cockcroft-Gault) 11.8 


  


  


  


 


 


Glucose Level


  101 mg/dL


(70-99) 


  


  


 


 


Calcium Level


  8.4 mg/dL


(8.5-10.1) 


  


  


 


 


Troponin I Quantitative


  < 0.017 ng/mL


(0.000-0.055) 


  


  


 

















KRISTIN LINDSAY MD Syed 3, 2017 11:37

## 2017-06-03 NOTE — RAD
Indication: Pressure ulcer on the outside of the right hip.



Time of exam 10:26 AM



2 views of the right hip were obtained. Femoral acetabular alignment is

normal. The femoral head and neck are intact. No fractures are seen. There are

soft tissue calcifications noted along the right hip and pelvis. No definite

soft tissue gas is seen. No bony destructive changes are identified.



Impression: No acute abnormality is detected.

## 2017-06-03 NOTE — PDOC
GENERAL


General:


see dictated H&P.


Problems:  





VITAL SIGNS


Vital Signs:





Vital Signs








  Date Time  Temp Pulse Resp B/P (MAP) Pulse Ox O2 Delivery O2 Flow Rate FiO2


 


6/3/17 09:19  51  104/40    


 


6/3/17 09:19     94 Nasal Cannula 2.0 


 


6/3/17 07:55 97.7  18     





 97.7       











I & O


I & O











Intake and Output 


 


 6/3/17





 07:00


 


Intake Total 1658 ml


 


Balance 1658 ml


 


 


 


Intake Oral 0 ml


 


IV Total 1658 ml


 


# Voids 1











ALLERGIES


Allergies:





Allergies








Coded Allergies Type Severity Reaction Last Updated Verified


 


  diphenhydramine Allergy Severe Shortness of Air 6/1/16 Yes


 


  NSAIDS (Non-Steroidal Anti-Inflamma Allergy Intermediate All except Feldene 1/

5/15 Yes


 


  Pentazocine Lactate Allergy Intermediate Blood pressure increase, chest pain, 

heart races, itching. 1/5/15 Yes


 


  Trimethobenzamide HCl Allergy Intermediate Severe muscle spasms 1/5/15 Yes


 


  acetaminophen Allergy Intermediate Shallow breathing, slow heart rate, 

stomach pain 1/5/15 Yes


 


  adhesive Allergy Intermediate rash--"will explain." 1/5/15 Yes


 


  butorphanol tartrate Allergy Intermediate Hallucinations, HA, anxiety, rash 1/

5/15 Yes


 


  carisoprodol Allergy Intermediate Fainting, fast heart rate, light-headed 1/5/

15 Yes


 


  chlorzoxazone Allergy Intermediate Nausea, dizzy, rash, feels like I might 

pass out 1/5/15 Yes


 


  codeine Allergy Intermediate Shallow breathing, vomiting, severe HA, 

bradycardia, itching 1/5/15 Yes


 


  codeine phosphate Allergy Intermediate Shallow breathing, slow heart rate, 

stomach pain 1/5/15 Yes


 


  cyclobenzaprine HCl Allergy Intermediate Chest pain,HA, fast heart rate, 

nervous 1/5/15 Yes


 


  erythromycin base Allergy Intermediate Fainting, fast/irreg HR, rash, 

itiching 1/5/15 Yes


 


  hydromorphone HCl Allergy Intermediate Apneic 1/5/15 Yes


 


  iodine Allergy Intermediate Itching, skin rash, hives 1/5/15 Yes


 


  ketorolac tromethamine Allergy Intermediate Chest pain, sudden severe HA 1/5/

15 Yes


 


  levofloxacin Allergy Intermediate Severe dizziness, heart races, itching, 

stomach pain 1/5/15 Yes


 


  midazolam HCl Allergy Intermediate Slow heart rate, affects bld pressure, 

mild rash 1/5/15 Yes


 


  morphine Allergy Intermediate apneic 1/5/15 Yes


 


  nalbuphine HCl Allergy Intermediate Tachycardia, confusion, hallucinations, 

nervous 1/5/15 Yes


 


  I S O L A T I O N *CONTACT* Allergy Unknown  12/8/16 Yes











MEDS


Medications:





Current Medications








 Medications


  (Trade)  Dose


 Ordered  Sig/Laura  Start Time


 Stop Time Status Last Admin


Dose Admin


 


 Amiodarone HCl


  (Cordarone)  200 mg  DAILY  6/3/17 09:00


    6/3/17 09:19


200 MG


 


 Atenolol


  (Tenormin)  25 mg  BID  6/2/17 21:00


    6/2/17 21:05


25 MG


 


 Fentanyl


  (Duragesic 50mcg/


 Hr Patch)  1 patch  Q3DAYS  6/5/17 09:00


     


 


 


 Fentanyl Citrate


  (Fentanyl 2ml


 Vial)  50 mcg  PRN Q2HR  PRN  6/2/17 16:45


 6/3/17 16:44   


 


 


 Isosorbide


 Mononitrate


  (Imdur)  30 mg  DAILY  6/3/17 09:00


     


 


 


 Lisinopril


  (Prinivil)  10 mg  DAILY  6/3/17 09:00


     


 


 


 Nitroglycerin


  (Nitrostat)  0.4 mg  PRN Q5MIN  PRN  6/2/17 20:30


     


 


 


 Nitroglycerin/


 Dextrose  250 ml @ 3


 mls/hr  1X  ONCE  6/2/17 16:15


 6/6/17 03:34   


 


 


 Non-Formulary


 Medication  50 mg  TID  PRN  6/2/17 20:30


   UNV  


 


 


 Ondansetron HCl


  (Zofran Odt)  4 mg  PRN Q6HRS  PRN  6/2/17 20:30


     


 


 


 Ondansetron HCl


  (Zofran)  4 mg  PRN Q8HRS  PRN  6/2/17 16:45


 6/3/17 16:44   


 


 


 Oxycodone/


 Acetaminophen


  (Percocet 10/325)  1 tab  PRN QID  PRN  6/2/17 20:30


    6/3/17 09:19


1 TAB


 


 Rivaroxaban


  (Xarelto)  15 mg  DAILYWSUP  6/3/17 17:00


   UNV  


 


 


 Ropinirole HCl


  (Requip)  0.25 mg  QHS  6/2/17 21:00


    6/2/17 21:04


0.25 MG


 


 Sodium Chloride  1,000 ml @ 


 50 mls/hr  Q20H  6/2/17 20:30


    6/2/17 21:05


50 MLS/HR


 


 Sodium Chloride


  (Normal Saline


 Flush)  10 ml  QSHIFT  PRN  6/2/17 16:15


     


 











LAB


Lab:





Laboratory Tests








Test


  6/2/17


16:25 6/2/17


16:34 6/2/17


17:35 6/2/17


23:58


 


White Blood Count


  5.9 x10^3/uL


(4.0-11.0) 


  


  


 


 


Red Blood Count


  4.18 x10^6/uL


(3.50-5.40) 


  


  


 


 


Hemoglobin


  13.1 g/dL


(12.0-15.5) 


  


  


 


 


Hematocrit


  39.7 %


(36.0-47.0) 


  


  


 


 


Mean Corpuscular Volume 95 fL ()    


 


Mean Corpuscular Hemoglobin 31 pg (25-35)    


 


Mean Corpuscular Hemoglobin


Concent 33 g/dL


(31-37) 


  


  


 


 


Red Cell Distribution Width


  14.5 %


(11.5-14.5) 


  


  


 


 


Platelet Count


  145 x10^3/uL


(140-400) 


  


  


 


 


Neutrophils (%) (Auto) 65 % (31-73)    


 


Lymphocytes (%) (Auto) 20 % (24-48)    


 


Monocytes (%) (Auto) 12 % (0-9)    


 


Eosinophils (%) (Auto) 3 % (0-3)    


 


Basophils (%) (Auto) 1 % (0-3)    


 


Neutrophils # (Auto)


  3.9 x10^3uL


(1.8-7.7) 


  


  


 


 


Lymphocytes # (Auto)


  1.2 x10^3/uL


(1.0-4.8) 


  


  


 


 


Monocytes # (Auto)


  0.7 x10^3/uL


(0.0-1.1) 


  


  


 


 


Eosinophils # (Auto)


  0.1 x10^3/uL


(0.0-0.7) 


  


  


 


 


Basophils # (Auto)


  0.0 x10^3/uL


(0.0-0.2) 


  


  


 


 


Sodium Level


  139 mmol/L


(136-145) 


  


  


 


 


Potassium Level


  4.7 mmol/L


(3.5-5.1) 


  


  


 


 


Chloride Level


  103 mmol/L


() 


  


  


 


 


Carbon Dioxide Level


  19 mmol/L


(21-32) 


  


  


 


 


Anion Gap 17 (6-14)    


 


Blood Urea Nitrogen


  163 mg/dL


(7-20) 


  


  


 


 


Creatinine


  4.8 mg/dL


(0.6-1.0) 


  


  


 


 


Estimated GFR


(Cockcroft-Gault) 8.8 


  


  


  


 


 


Glucose Level


  94 mg/dL


(70-99) 


  


  


 


 


Calcium Level


  9.1 mg/dL


(8.5-10.1) 


  


  


 


 


Magnesium Level


  2.1 mg/dL


(1.8-2.4) 


  


  


 


 


Total Bilirubin


  0.4 mg/dL


(0.2-1.0) 


  


  


 


 


Direct Bilirubin


  0.1 mg/dL


(0.0-0.2) 


  


  


 


 


Aspartate Amino Transf


(AST/SGOT) 25 U/L (15-37) 


  


  


  


 


 


Alanine Aminotransferase


(ALT/SGPT) 29 U/L (14-59) 


  


  


  


 


 


Alkaline Phosphatase


  157 U/L


() 


  


  


 


 


Creatine Kinase


  49 U/L


() 


  


  


 


 


Creatine Kinase MB (Mass)


  2.2 ng/mL


(0.0-3.6) 


  


  


 


 


Creatine Kinase MB Relative


Index  % (0-4) 


  


  


  


 


 


Troponin I Quantitative


  < 0.017 ng/mL


(0.000-0.055) 


  


  < 0.017 ng/mL


(0.000-0.055)


 


NT-Pro-B-Type Natriuretic


Peptide 1252 pg/mL


(0-449) 


  


  


 


 


Total Protein


  6.9 g/dL


(6.4-8.2) 


  


  


 


 


Albumin


  3.2 g/dL


(3.4-5.0) 


  


  


 


 


Lipase


  599 U/L


() 


  


  


 


 


Bedside Troponin I


  


  0.01 ng/ml


(<0.08) 


  


 


 


Urine Collection Type   Unknown  


 


Urine Color   Yellow  


 


Urine Clarity   Clear  


 


Urine pH   5.0  


 


Urine Specific Gravity   1.015  


 


Urine Protein


  


  


  Negative mg/dL


(NEG-TRACE) 


 


 


Urine Glucose (UA)


  


  


  Negative mg/dL


(NEG) 


 


 


Urine Ketones (Stick)


  


  


  Negative mg/dL


(NEG) 


 


 


Urine Blood   Negative (NEG)  


 


Urine Nitrite   Negative (NEG)  


 


Urine Bilirubin   Negative (NEG)  


 


Urine Urobilinogen Dipstick


  


  


  0.2 mg/dL (0.2


mg/dL) 


 


 


Urine Leukocyte Esterase   Small (NEG)  


 


Urine RBC   1-2 /HPF (0-2)  


 


Urine WBC


  


  


  11-20 /HPF


(0-4) 


 


 


Urine Squamous Epithelial


Cells 


  


  Few /LPF 


  


 


 


Urine Bacteria


  


  


  Few /HPF


(0-FEW) 


 


 


Urine Hyaline Casts


  


  


  Occasional


/HPF 


 


 


Test


  6/3/17


04:50 


  


  


 


 


Troponin I Quantitative


  < 0.017 ng/mL


(0.000-0.055) 


  


  


 

















KRISTIN LINDSAY MD Syed 3, 2017 10:08

## 2017-06-04 VITALS — DIASTOLIC BLOOD PRESSURE: 56 MMHG | SYSTOLIC BLOOD PRESSURE: 140 MMHG

## 2017-06-04 VITALS — DIASTOLIC BLOOD PRESSURE: 57 MMHG | SYSTOLIC BLOOD PRESSURE: 124 MMHG

## 2017-06-04 VITALS — SYSTOLIC BLOOD PRESSURE: 93 MMHG | DIASTOLIC BLOOD PRESSURE: 40 MMHG

## 2017-06-04 VITALS — DIASTOLIC BLOOD PRESSURE: 45 MMHG | SYSTOLIC BLOOD PRESSURE: 83 MMHG

## 2017-06-04 VITALS — DIASTOLIC BLOOD PRESSURE: 48 MMHG | SYSTOLIC BLOOD PRESSURE: 96 MMHG

## 2017-06-04 VITALS — DIASTOLIC BLOOD PRESSURE: 61 MMHG | SYSTOLIC BLOOD PRESSURE: 148 MMHG

## 2017-06-04 LAB
ANION GAP SERPL CALC-SCNC: 11 MMOL/L (ref 6–14)
BUN SERPL-MCNC: 83 MG/DL (ref 7–20)
CALCIUM SERPL-MCNC: 8.7 MG/DL (ref 8.5–10.1)
CHLORIDE SERPL-SCNC: 113 MMOL/L (ref 98–107)
CO2 SERPL-SCNC: 21 MMOL/L (ref 21–32)
CREAT SERPL-MCNC: 2 MG/DL (ref 0.6–1)
GFR SERPLBLD BASED ON 1.73 SQ M-ARVRAT: 24 ML/MIN
GLUCOSE SERPL-MCNC: 84 MG/DL (ref 70–99)
POTASSIUM SERPL-SCNC: 4.6 MMOL/L (ref 3.5–5.1)
SODIUM SERPL-SCNC: 145 MMOL/L (ref 136–145)

## 2017-06-04 RX ADMIN — OXYCODONE HYDROCHLORIDE AND ACETAMINOPHEN PRN TAB: 10; 325 TABLET ORAL at 21:04

## 2017-06-04 RX ADMIN — ISOSORBIDE MONONITRATE SCH MG: 30 TABLET, EXTENDED RELEASE ORAL at 09:07

## 2017-06-04 RX ADMIN — ONDANSETRON PRN MG: 4 TABLET, ORALLY DISINTEGRATING ORAL at 10:30

## 2017-06-04 RX ADMIN — ROPINIROLE HYDROCHLORIDE SCH MG: 0.25 TABLET, FILM COATED ORAL at 20:46

## 2017-06-04 RX ADMIN — APIXABAN SCH MG: 2.5 TABLET, FILM COATED ORAL at 09:05

## 2017-06-04 RX ADMIN — LISINOPRIL SCH MG: 10 TABLET ORAL at 09:00

## 2017-06-04 RX ADMIN — SODIUM CHLORIDE SCH MLS/HR: 450 INJECTION, SOLUTION INTRAVENOUS at 09:15

## 2017-06-04 RX ADMIN — ATENOLOL SCH MG: 25 TABLET ORAL at 09:00

## 2017-06-04 RX ADMIN — ATENOLOL SCH MG: 25 TABLET ORAL at 20:45

## 2017-06-04 RX ADMIN — AMIODARONE HYDROCHLORIDE SCH MG: 200 TABLET ORAL at 09:12

## 2017-06-04 RX ADMIN — APIXABAN SCH MG: 2.5 TABLET, FILM COATED ORAL at 20:46

## 2017-06-04 NOTE — HP
ADMIT DATE:  



CHIEF COMPLAINT AND HISTORY OF PRESENT ILLNESS:  This 77-year-old white female

was at an MRI center getting an MRI on her right hip when she developed chest

pain with shortness of breath and nausea, was unresponsive to nitroglycerin, and

she was brought to the Emergency Room where she was found to be having

pancreatitis with an elevated lipase and markedly elevated kidney function with

a BUN in the 160 range and creatinine of approximately 4.8.  She was admitted

for this constellation along with the chest pain, which was felt probably more

likely due to the pancreatitis, which she has not had prior.



PAST MEDICAL HISTORY:  Remarkable for 3-vessel coronary artery disease.  She has

had a prior MI.  She has COPD.  She has chronic pain due to reflex sympathetic

dystrophy.



PAST SURGICAL HISTORY:  She has had a prior cholecystectomy, hysterectomy, knee

replacement, lumbar laminectomy, tonsillectomy, hemorrhoidectomy, shoulder

surgery, hernia surgeries, has had a prior cardiac stenting.



MEDICATIONS:  Brought with the patient, listed on the computer and have been

addressed.



ALLERGIES:  SHE HAS MULTIPLE ALLERGIES INCLUDING NONSTEROIDAL

ANTI-INFLAMMATORIES, PENTAZOCINE, TRIMETHOBENZAMIDE, TYLENOL, ADHESIVE,

BUTORPHANOL, SOMA, CHLORZOXAZONE, CODEINE, CYCLOBENZAPRINE, ETC., WHICH ARE

LISTED ON THE COMPUTER.



SOCIAL HISTORY:  She is a reformed smoker, nondrinker, does not use alcohol. 

, lives at home with her  and daughter.



FAMILY HISTORY:  Noncontributory.



REVIEW OF SYSTEMS:  Remarkable for that as mentioned above.



PHYSICAL EXAMINATION:

GENERAL:  She is a well-developed, well-nourished white female in no acute

distress by the time I have seen her and seems to be mentally functioning, which

is unusual with a BUN of 160.  She does have dry mucous membranes.  She was

having some slurred speech in the Emergency Room, which I felt was likely due to

her narcotics, but more likely due to azotemia.

HEAD, EYES, EARS, NOSE, AND THROAT:  Remarkable for dryness of mucous membranes.

NECK:  Supple without adenopathy or thyromegaly.

CHEST:  Reveals decreased breath sounds, but clear.

HEART:  Regular rate and rhythm without S3, S4, or murmur.

ABDOMEN:  Soft, nontender, without hepatosplenomegaly or masses.

EXTREMITIES:  Without cyanosis, clubbing or edema.

NEUROLOGICAL:  Exam is intact.



IMPRESSION:  Pancreatitis with acute renal failure and chest pain, likely

related to the pancreatitis at this point in time with dehydration.  She also

has what appears to be a decubitus type changes over right greater trochanter

what Emergency Room thought maybe it was a melanoma.  We will ask dermatology to

see for the same.



PLAN:  The patient has been admitted.  Cardiology as well as GI as well as

dermatology consultations have been obtained.  She will be hydrated.  Renal has

been consulted.  She will be followed alone for her renal function as well as

pancreatitis, monitored, managed and treated appropriately.

 



______________________________

KRISTIN LINDSAY MD



DR:  MATTHEW/kendall  JOB#:  [id number] / [id number]

DD:  06/04/2017 11:19  DT:  06/04/2017 14:27

## 2017-06-04 NOTE — PDOC
Provider Note


Provider Note


She continues to have episodic retrosternal chest pain


The duration of the pain is not typical for angina. She says it is now very 

brief lasting a few seconds.


Lipase level has come down. Her BUN/creatinine and creatinine have improved.


We will thus plan to investigate her for underlying coronary artery disease 

with an MPI tomorrow.


's custody with ANNIE Dahl MD Jun 4, 2017 16:52

## 2017-06-04 NOTE — PDOC2
CONSULT


Date of Consult


Date of Consult


DATE: 6/4/17 


TIME: 15:07





Past Medical History


Cardiovascular:  CAD, CHF, HTN, MI, Hyperlipidemia


Pulmonary:  COPD


CENTRAL NERVOUS SYSTEM:  Other


GI:  Constipation, Hemorrhoids, Other


Psych:  Anxiety


Musculoskeletal:  Other


Renal/:  Chronic renal insuff





Past Surgical History


Past Surgical History:  Appendectomy, Cholecystectomy, Hernia Repair, Total 

knee replacement, Tonsillectomy, Hysterectomy





Family History


Family History:  Cancer, Coronary Artery Disease





Social History


ALCOHOL:  none


Drugs:  None


Lives:  with Family





Current Problem List


Problem List


Problems


Medical Problems:


(1) Acute renal failure


Status: Acute  





(2) Angina at rest


Status: Acute  





(3) Chest pain


Status: Acute  





(4) Melanoma


Status: Acute  





(5) Pancreatitis


Status: Acute  











Current Medications


Current Medications





Current Medications


Nitroglycerin (Nitrostat) 0.4 mg PRN Q5MIN  PRN SL CP RATING > 1/10;  Start 6/2/ 17 at 16:15;  Stop 6/2/17 at 20:42;  Status DC


Nitroglycerin/ Dextrose 250 ml @ 3 mls/hr 1X  ONCE IV ;  Start 6/2/17 at 16:15;

  Stop 6/6/17 at 03:34


Sodium Chloride 1,000 ml @  1,000 mls/hr Q1H IV  Last administered on 6/2/17at 

16:31;  Start 6/2/17 at 16:30;  Stop 6/2/17 at 17:29;  Status DC


Sodium Chloride (Normal Saline Flush) 10 ml QSHIFT  PRN IV AFTER MEDS AND BLOOD 

DRAWS;  Start 6/2/17 at 16:15


Ondansetron HCl (Zofran) 4 mg PRN Q8HRS  PRN IV NAUSEA/VOMITING;  Start 6/2/17 

at 16:45;  Stop 6/3/17 at 16:44;  Status DC


Fentanyl Citrate (Fentanyl 2ml Vial) 50 mcg PRN Q2HR  PRN IV PAIN;  Start 6/2/ 17 at 16:45;  Stop 6/3/17 at 16:44;  Status DC


Sodium Chloride 1,000 ml @  100 mls/hr Q10H IV  Last administered on 6/2/17at 18

:08;  Start 6/2/17 at 16:43;  Stop 6/3/17 at 16:42;  Status DC


Nitroglycerin (Nitrostat) 0.4 mg PRN Q5MIN  PRN SL CHEST PAIN;  Start 6/2/17 at 

16:45;  Stop 6/2/17 at 20:42;  Status DC


Atenolol (Tenormin) 25 mg BID PO  Last administered on 6/2/17at 21:05;  Start 6/ 2/17 at 21:00


Fentanyl (Duragesic 50mcg/ Hr Patch) 1 patch Q3DAYS TD ;  Start 6/3/17 at 09:00

;  Stop 6/3/17 at 09:00;  Status DC


Isosorbide Mononitrate (Imdur) 30 mg DAILY PO  Last administered on 6/4/17at 09:

07;  Start 6/3/17 at 09:00


Lisinopril (Prinivil) 10 mg DAILY PO ;  Start 6/3/17 at 09:00


Nitroglycerin (Nitrostat) 0.4 mg PRN Q5MIN  PRN SL CHEST PAIN;  Start 6/2/17 at 

20:30


Ondansetron HCl (Zofran Odt) 4 mg PRN Q6HRS  PRN PO NAUSEA/VOMITING Last 

administered on 6/4/17at 10:30;  Start 6/2/17 at 20:30


Oxycodone/ Acetaminophen (Percocet 10/325) 1 tab PRN QID  PRN PO PAIN Last 

administered on 6/3/17at 20:37;  Start 6/2/17 at 20:30


Non-Formulary Medication 50 mg TID  PRN PO PAIN;  Start 6/2/17 at 20:30;  

Status UNV


Rivaroxaban (Xarelto) 15 mg DAILYWSUP PO ;  Start 6/3/17 at 17:00;  Status UNV


Ropinirole HCl (Requip) 0.25 mg QHS PO  Last administered on 6/3/17at 20:37;  

Start 6/2/17 at 21:00


Amiodarone HCl (Cordarone) 200 mg DAILY PO  Last administered on 6/4/17at 09:12

;  Start 6/3/17 at 09:00


Sodium Chloride 1,000 ml @  50 mls/hr Q20H IV  Last administered on 6/4/17at 09:

15;  Start 6/2/17 at 20:30


Fentanyl (Duragesic 50mcg/ Hr Patch) 1 patch Q3DAYS TD ;  Start 6/5/17 at 09:00


Apixaban (Eliquis) 2.5 mg BID PO  Last administered on 6/4/17at 09:05;  Start 6/

3/17 at 11:00


Info (Anti-Coagulation Monitoring By Pharmacy) 1 each PRN DAILY  PRN MC SEE 

COMMENTS;  Start 6/3/17 at 10:45





Active Scripts


Active


Reported


Amiodarone Hcl 200 Mg Tablet 1 Tab PO DAILY


Lasix (Furosemide) 20 Mg Tablet 0.5 Tab PO BID


Xarelto (Rivaroxaban) 20 Mg Tablet 20 Mg PO DAILY


Isosorbide Mononitrate Er (Isosorbide Mononitrate) 60 Mg Tab.er.24h 1 Tab PO 

DAILY


Requip (Ropinirole Hcl) 0.5 Mg Tablet 0.25 Tab PO HS


     NEXT DOSE: 11/28/15 EVENING


Demerol (Meperidine Hcl) 50 Mg Tablet 50 Mg PO TID PRN


Percocet  Mg Tablet (Oxycodone/Acetaminophen) 1 Each Tablet 1 Tab PO QID 

PRN


     NEXT DOSE: 11/28/15 AFTERNOON


FENTANYL 50mcg/hr (Fentanyl) 1 Each Patch.td72 1 Patch TP Q3DAYS


     NEXT DOSE: 11/30/15 CHANGE PATCH


Nitrostat (Nitroglycerin) 0.4 Mg Tab.subl 0.4 Mg SL PRN Q5MIN PRN


Zofran Odt (Ondansetron) 4 Mg Tab.rapdis 4 Mg PO Q6HRS PRN


Lisinopril 20 Mg Tablet 1 Tab PO DAILY


     NEXT DOSE: 11/29/15 AM


Potassium Chloride 10 Meq Tablet.er 1 Tab PO DAILY


     NEXT DOSE: 11/29/15 AM


Valium (Diazepam) 10 Mg Tablet 10 Mg PO QID


     NEXT DOSE: 11/28/15 2 PM


Tenormin (Atenolol) 50 Mg Tablet 50 Mg PO BID


     NEXT DOSE: 11/28/15 PM





Allergies


Allergies:  


Coded Allergies:  


     diphenhydramine (Verified  Allergy, Severe, Shortness of Air, 6/1/16)


 Patient gets shortness of air and breaks out in hives.


     NSAIDS (Non-Steroidal Anti-Inflamma (Verified  Allergy, Intermediate, All 

except Feldene, 1/5/15)


     Pentazocine Lactate (Verified  Allergy, Intermediate, Blood pressure 

increase, chest pain, heart races, itching., 1/5/15)


     Trimethobenzamide HCl (Verified  Allergy, Intermediate, Severe muscle 

spasms, 1/5/15)


     acetaminophen (Verified  Allergy, Intermediate, Shallow breathing, slow 

heart rate, stomach pain, 1/5/15)


     adhesive (Verified  Allergy, Intermediate, rash--"will explain.", 1/5/15)


 "Adhesive tape" & "paper tape" Pt's list reads:


 "rash----will explain."


     butorphanol tartrate (Verified  Allergy, Intermediate, Hallucinations, HA, 

anxiety, rash, 1/5/15)


     carisoprodol (Verified  Allergy, Intermediate, Fainting, fast heart rate, 

light-headed, 1/5/15)


     chlorzoxazone (Verified  Allergy, Intermediate, Nausea, dizzy, rash, feels 

like I might pass out, 1/5/15)


     codeine (Verified  Allergy, Intermediate, Shallow breathing, vomiting, 

severe HA, bradycardia, itching, 1/5/15)


     codeine phosphate (Verified  Allergy, Intermediate, Shallow breathing, 

slow heart rate, stomach pain, 1/5/15)


     cyclobenzaprine HCl (Verified  Allergy, Intermediate, Chest pain,HA, fast 

heart rate, nervous, 1/5/15)


     erythromycin base (Verified  Allergy, Intermediate, Fainting, fast/irreg HR

, rash, itiching, 1/5/15)


     hydromorphone HCl (Verified  Allergy, Intermediate, Apneic, 1/5/15)


     iodine (Verified  Allergy, Intermediate, Itching, skin rash, hives, 1/5/15)


     ketorolac tromethamine (Verified  Allergy, Intermediate, Chest pain, 

sudden severe HA, 1/5/15)


     levofloxacin (Verified  Allergy, Intermediate, Severe dizziness, heart 

races, itching, stomach pain, 1/5/15)


     midazolam HCl (Verified  Allergy, Intermediate, Slow heart rate, affects 

bld pressure, mild rash, 1/5/15)


     morphine (Verified  Allergy, Intermediate, apneic, 1/5/15)


     nalbuphine HCl (Verified  Allergy, Intermediate, Tachycardia, confusion, 

hallucinations, nervous, 1/5/15)


     I S O L A T I O N *CONTACT* (Verified  Allergy, Unknown, 12/8/16)


 ESBL





Vitals


VITALS





Vital Signs








  Date Time  Temp Pulse Resp B/P (MAP) Pulse Ox O2 Delivery O2 Flow Rate FiO2


 


6/4/17 10:50 97.7 65 18 96/48 (64) 93 Room Air  





 97.7       


 


6/4/17 08:00       2.0 











Labs


Labs





Laboratory Tests








Test


  6/2/17


16:25 6/2/17


16:34 6/2/17


17:35 6/2/17


23:58


 


White Blood Count


  5.9 x10^3/uL


(4.0-11.0) 


  


  


 


 


Red Blood Count


  4.18 x10^6/uL


(3.50-5.40) 


  


  


 


 


Hemoglobin


  13.1 g/dL


(12.0-15.5) 


  


  


 


 


Hematocrit


  39.7 %


(36.0-47.0) 


  


  


 


 


Mean Corpuscular Volume 95 fL ()    


 


Mean Corpuscular Hemoglobin 31 pg (25-35)    


 


Mean Corpuscular Hemoglobin


Concent 33 g/dL


(31-37) 


  


  


 


 


Red Cell Distribution Width


  14.5 %


(11.5-14.5) 


  


  


 


 


Platelet Count


  145 x10^3/uL


(140-400) 


  


  


 


 


Neutrophils (%) (Auto) 65 % (31-73)    


 


Lymphocytes (%) (Auto) 20 % (24-48)    


 


Monocytes (%) (Auto) 12 % (0-9)    


 


Eosinophils (%) (Auto) 3 % (0-3)    


 


Basophils (%) (Auto) 1 % (0-3)    


 


Neutrophils # (Auto)


  3.9 x10^3uL


(1.8-7.7) 


  


  


 


 


Lymphocytes # (Auto)


  1.2 x10^3/uL


(1.0-4.8) 


  


  


 


 


Monocytes # (Auto)


  0.7 x10^3/uL


(0.0-1.1) 


  


  


 


 


Eosinophils # (Auto)


  0.1 x10^3/uL


(0.0-0.7) 


  


  


 


 


Basophils # (Auto)


  0.0 x10^3/uL


(0.0-0.2) 


  


  


 


 


Sodium Level


  139 mmol/L


(136-145) 


  


  


 


 


Potassium Level


  4.7 mmol/L


(3.5-5.1) 


  


  


 


 


Chloride Level


  103 mmol/L


() 


  


  


 


 


Carbon Dioxide Level


  19 mmol/L


(21-32) 


  


  


 


 


Anion Gap 17 (6-14)    


 


Blood Urea Nitrogen


  163 mg/dL


(7-20) 


  


  


 


 


Creatinine


  4.8 mg/dL


(0.6-1.0) 


  


  


 


 


Estimated GFR


(Cockcroft-Gault) 8.8 


  


  


  


 


 


Glucose Level


  94 mg/dL


(70-99) 


  


  


 


 


Calcium Level


  9.1 mg/dL


(8.5-10.1) 


  


  


 


 


Magnesium Level


  2.1 mg/dL


(1.8-2.4) 


  


  


 


 


Total Bilirubin


  0.4 mg/dL


(0.2-1.0) 


  


  


 


 


Direct Bilirubin


  0.1 mg/dL


(0.0-0.2) 


  


  


 


 


Aspartate Amino Transf


(AST/SGOT) 25 U/L (15-37) 


  


  


  


 


 


Alanine Aminotransferase


(ALT/SGPT) 29 U/L (14-59) 


  


  


  


 


 


Alkaline Phosphatase


  157 U/L


() 


  


  


 


 


Creatine Kinase


  49 U/L


() 


  


  


 


 


Creatine Kinase MB (Mass)


  2.2 ng/mL


(0.0-3.6) 


  


  


 


 


Creatine Kinase MB Relative


Index  % (0-4) 


  


  


  


 


 


Troponin I Quantitative


  < 0.017 ng/mL


(0.000-0.055) 


  


  < 0.017 ng/mL


(0.000-0.055)


 


NT-Pro-B-Type Natriuretic


Peptide 1252 pg/mL


(0-449) 


  


  


 


 


Total Protein


  6.9 g/dL


(6.4-8.2) 


  


  


 


 


Albumin


  3.2 g/dL


(3.4-5.0) 


  


  


 


 


Lipase


  599 U/L


() 


  


  


 


 


Bedside Troponin I


  


  0.01 ng/ml


(<0.08) 


  


 


 


Urine Collection Type   Unknown  


 


Urine Color   Yellow  


 


Urine Clarity   Clear  


 


Urine pH   5.0  


 


Urine Specific Gravity   1.015  


 


Urine Protein


  


  


  Negative mg/dL


(NEG-TRACE) 


 


 


Urine Glucose (UA)


  


  


  Negative mg/dL


(NEG) 


 


 


Urine Ketones (Stick)


  


  


  Negative mg/dL


(NEG) 


 


 


Urine Blood   Negative (NEG)  


 


Urine Nitrite   Negative (NEG)  


 


Urine Bilirubin   Negative (NEG)  


 


Urine Urobilinogen Dipstick


  


  


  0.2 mg/dL (0.2


mg/dL) 


 


 


Urine Leukocyte Esterase   Small (NEG)  


 


Urine RBC   1-2 /HPF (0-2)  


 


Urine WBC


  


  


  11-20 /HPF


(0-4) 


 


 


Urine Squamous Epithelial


Cells 


  


  Few /LPF 


  


 


 


Urine Bacteria


  


  


  Few /HPF


(0-FEW) 


 


 


Urine Hyaline Casts


  


  


  Occasional


/HPF 


 


 


Test


  6/3/17


04:50 6/4/17


10:28 


  


 


 


Erythrocyte Sedimentation Rate 7 (0-25)    


 


Sodium Level


  142 mmol/L


(136-145) 145 mmol/L


(136-145) 


  


 


 


Potassium Level


  4.1 mmol/L


(3.5-5.1) 4.6 mmol/L


(3.5-5.1) 


  


 


 


Chloride Level


  110 mmol/L


() 113 mmol/L


() 


  


 


 


Carbon Dioxide Level


  20 mmol/L


(21-32) 21 mmol/L


(21-32) 


  


 


 


Anion Gap 12 (6-14)  11 (6-14)   


 


Blood Urea Nitrogen


  142 mg/dL


(7-20) 83 mg/dL


(7-20) 


  


 


 


Creatinine


  3.7 mg/dL


(0.6-1.0) 2.0 mg/dL


(0.6-1.0) 


  


 


 


Estimated GFR


(Cockcroft-Gault) 11.8 


  24.0 


  


  


 


 


Glucose Level


  101 mg/dL


(70-99) 84 mg/dL


(70-99) 


  


 


 


Calcium Level


  8.4 mg/dL


(8.5-10.1) 8.7 mg/dL


(8.5-10.1) 


  


 


 


Troponin I Quantitative


  < 0.017 ng/mL


(0.000-0.055) 


  


  


 


 


Lipase


  


  321 U/L


() 


  


 








Laboratory Tests








Test


  6/4/17


10:28


 


Sodium Level


  145 mmol/L


(136-145)


 


Potassium Level


  4.6 mmol/L


(3.5-5.1)


 


Chloride Level


  113 mmol/L


()


 


Carbon Dioxide Level


  21 mmol/L


(21-32)


 


Anion Gap 11 (6-14) 


 


Blood Urea Nitrogen


  83 mg/dL


(7-20)


 


Creatinine


  2.0 mg/dL


(0.6-1.0)


 


Estimated GFR


(Cockcroft-Gault) 24.0 


 


 


Glucose Level


  84 mg/dL


(70-99)


 


Calcium Level


  8.7 mg/dL


(8.5-10.1)


 


Lipase


  321 U/L


()











Assessment/Plan


Assessment/Plan


RENAL CONSULT / MICHAEL





Consult seen 6/3/17.


Dictated.


# [id number]











ADELINE RODRIGUEZ MD Jun 4, 2017 15:08

## 2017-06-04 NOTE — PDOC
GENERAL


General:


vss and afebrile. awake and alert. complains of right sided chest pain and into 

right flank area this am. abdomen soft and non tender with lipase down to 

normal and will restart feeding. renal function improved with BUN down to 80 

and creatinine down to 2.0. continue ivf's.


Problems:  





VITAL SIGNS


Vital Signs:





Vital Signs








  Date Time  Temp Pulse Resp B/P (MAP) Pulse Ox O2 Delivery O2 Flow Rate FiO2


 


6/4/17 09:12  63  148/61    


 


6/4/17 07:50 97.5  18  98 Room Air  





 97.5       


 


6/3/17 21:35       2.0 











I & O


I & O











Intake and Output 


 


 6/4/17





 07:00


 


Intake Total 658 ml


 


Output Total 1200 ml


 


Balance -542 ml


 


 


 


Intake Oral 0 ml


 


IV Total 658 ml


 


Output Urine Total 1200 ml


 


# Voids 2











ALLERGIES


Allergies:





Allergies








Coded Allergies Type Severity Reaction Last Updated Verified


 


  diphenhydramine Allergy Severe Shortness of Air 6/1/16 Yes


 


  NSAIDS (Non-Steroidal Anti-Inflamma Allergy Intermediate All except Feldene 1/

5/15 Yes


 


  Pentazocine Lactate Allergy Intermediate Blood pressure increase, chest pain, 

heart races, itching. 1/5/15 Yes


 


  Trimethobenzamide HCl Allergy Intermediate Severe muscle spasms 1/5/15 Yes


 


  acetaminophen Allergy Intermediate Shallow breathing, slow heart rate, 

stomach pain 1/5/15 Yes


 


  adhesive Allergy Intermediate rash--"will explain." 1/5/15 Yes


 


  butorphanol tartrate Allergy Intermediate Hallucinations, HA, anxiety, rash 1/

5/15 Yes


 


  carisoprodol Allergy Intermediate Fainting, fast heart rate, light-headed 1/5/

15 Yes


 


  chlorzoxazone Allergy Intermediate Nausea, dizzy, rash, feels like I might 

pass out 1/5/15 Yes


 


  codeine Allergy Intermediate Shallow breathing, vomiting, severe HA, 

bradycardia, itching 1/5/15 Yes


 


  codeine phosphate Allergy Intermediate Shallow breathing, slow heart rate, 

stomach pain 1/5/15 Yes


 


  cyclobenzaprine HCl Allergy Intermediate Chest pain,HA, fast heart rate, 

nervous 1/5/15 Yes


 


  erythromycin base Allergy Intermediate Fainting, fast/irreg HR, rash, 

itiching 1/5/15 Yes


 


  hydromorphone HCl Allergy Intermediate Apneic 1/5/15 Yes


 


  iodine Allergy Intermediate Itching, skin rash, hives 1/5/15 Yes


 


  ketorolac tromethamine Allergy Intermediate Chest pain, sudden severe HA 1/5/

15 Yes


 


  levofloxacin Allergy Intermediate Severe dizziness, heart races, itching, 

stomach pain 1/5/15 Yes


 


  midazolam HCl Allergy Intermediate Slow heart rate, affects bld pressure, 

mild rash 1/5/15 Yes


 


  morphine Allergy Intermediate apneic 1/5/15 Yes


 


  nalbuphine HCl Allergy Intermediate Tachycardia, confusion, hallucinations, 

nervous 1/5/15 Yes


 


  I S O L A T I O N *CONTACT* Allergy Unknown  12/8/16 Yes











MEDS


Medications:





Current Medications








 Medications


  (Trade)  Dose


 Ordered  Sig/Laura  Start Time


 Stop Time Status Last Admin


Dose Admin


 


 Amiodarone HCl


  (Cordarone)  200 mg  DAILY  6/3/17 09:00


    6/4/17 09:12


200 MG


 


 Apixaban


  (Eliquis)  2.5 mg  BID  6/3/17 11:00


    6/4/17 09:05


2.5 MG


 


 Atenolol


  (Tenormin)  25 mg  BID  6/2/17 21:00


    6/2/17 21:05


25 MG


 


 Fentanyl


  (Duragesic 50mcg/


 Hr Patch)  1 patch  Q3DAYS  6/5/17 09:00


     


 


 


 Fentanyl Citrate


  (Fentanyl 2ml


 Vial)  50 mcg  PRN Q2HR  PRN  6/2/17 16:45


 6/3/17 16:44 DC  


 


 


 Info


  (Anti-Coagulation


 Monitoring By


 Pharmacy)  1 each  PRN DAILY  PRN  6/3/17 10:45


     


 


 


 Isosorbide


 Mononitrate


  (Imdur)  30 mg  DAILY  6/3/17 09:00


    6/4/17 09:07


30 MG


 


 Lisinopril


  (Prinivil)  10 mg  DAILY  6/3/17 09:00


     


 


 


 Nitroglycerin


  (Nitrostat)  0.4 mg  PRN Q5MIN  PRN  6/2/17 20:30


     


 


 


 Nitroglycerin/


 Dextrose  250 ml @ 3


 mls/hr  1X  ONCE  6/2/17 16:15


 6/6/17 03:34   


 


 


 Non-Formulary


 Medication  50 mg  TID  PRN  6/2/17 20:30


   UNV  


 


 


 Ondansetron HCl


  (Zofran Odt)  4 mg  PRN Q6HRS  PRN  6/2/17 20:30


    6/4/17 10:30


4 MG


 


 Ondansetron HCl


  (Zofran)  4 mg  PRN Q8HRS  PRN  6/2/17 16:45


 6/3/17 16:44 DC  


 


 


 Oxycodone/


 Acetaminophen


  (Percocet 10/325)  1 tab  PRN QID  PRN  6/2/17 20:30


    6/3/17 20:37


1 TAB


 


 Rivaroxaban


  (Xarelto)  15 mg  DAILYWSUP  6/3/17 17:00


   UNV  


 


 


 Ropinirole HCl


  (Requip)  0.25 mg  QHS  6/2/17 21:00


    6/3/17 20:37


0.25 MG


 


 Sodium Chloride  1,000 ml @ 


 50 mls/hr  Q20H  6/2/17 20:30


    6/4/17 09:15


50 MLS/HR


 


 Sodium Chloride


  (Normal Saline


 Flush)  10 ml  QSHIFT  PRN  6/2/17 16:15


     


 











LAB


Lab:





Laboratory Tests








Test


  6/4/17


10:28


 


Sodium Level


  145 mmol/L


(136-145)


 


Potassium Level


  4.6 mmol/L


(3.5-5.1)


 


Chloride Level


  113 mmol/L


()


 


Carbon Dioxide Level


  21 mmol/L


(21-32)


 


Anion Gap 11 (6-14) 


 


Blood Urea Nitrogen


  83 mg/dL


(7-20)


 


Creatinine


  2.0 mg/dL


(0.6-1.0)


 


Estimated GFR


(Cockcroft-Gault) 24.0 


 


 


Glucose Level


  84 mg/dL


(70-99)


 


Calcium Level


  8.7 mg/dL


(8.5-10.1)


 


Lipase


  321 U/L


()

















KRISTIN LINDSAY MD Jun 4, 2017 11:14

## 2017-06-05 VITALS — SYSTOLIC BLOOD PRESSURE: 150 MMHG | DIASTOLIC BLOOD PRESSURE: 58 MMHG

## 2017-06-05 VITALS — DIASTOLIC BLOOD PRESSURE: 56 MMHG | SYSTOLIC BLOOD PRESSURE: 122 MMHG

## 2017-06-05 VITALS — DIASTOLIC BLOOD PRESSURE: 56 MMHG | SYSTOLIC BLOOD PRESSURE: 156 MMHG

## 2017-06-05 VITALS — SYSTOLIC BLOOD PRESSURE: 137 MMHG | DIASTOLIC BLOOD PRESSURE: 43 MMHG

## 2017-06-05 VITALS — SYSTOLIC BLOOD PRESSURE: 113 MMHG | DIASTOLIC BLOOD PRESSURE: 54 MMHG

## 2017-06-05 LAB
ANION GAP SERPL CALC-SCNC: 9 MMOL/L (ref 6–14)
BUN SERPL-MCNC: 50 MG/DL (ref 7–20)
CALCIUM SERPL-MCNC: 8.2 MG/DL (ref 8.5–10.1)
CHLORIDE SERPL-SCNC: 113 MMOL/L (ref 98–107)
CHOLEST SERPL-MCNC: 111 MG/DL (ref 0–200)
CHOLEST/HDLC SERPL: 3.4 {RATIO}
CO2 SERPL-SCNC: 21 MMOL/L (ref 21–32)
CREAT SERPL-MCNC: 1.6 MG/DL (ref 0.6–1)
GFR SERPLBLD BASED ON 1.73 SQ M-ARVRAT: 31.1 ML/MIN
GLUCOSE SERPL-MCNC: 85 MG/DL (ref 70–99)
HDLC SERPL-MCNC: 33 MG/DL (ref 40–60)
NONHDLC SERPL-MCNC: 78 MG/DL (ref 0–129)
POTASSIUM SERPL-SCNC: 4 MMOL/L (ref 3.5–5.1)
SODIUM SERPL-SCNC: 143 MMOL/L (ref 136–145)
TRIGL SERPL-MCNC: 136 MG/DL (ref 0–150)

## 2017-06-05 RX ADMIN — OXYCODONE HYDROCHLORIDE AND ACETAMINOPHEN PRN TAB: 10; 325 TABLET ORAL at 15:56

## 2017-06-05 RX ADMIN — ISOSORBIDE MONONITRATE SCH MG: 30 TABLET, EXTENDED RELEASE ORAL at 09:32

## 2017-06-05 RX ADMIN — OXYCODONE HYDROCHLORIDE AND ACETAMINOPHEN PRN TAB: 10; 325 TABLET ORAL at 09:40

## 2017-06-05 RX ADMIN — APIXABAN SCH MG: 2.5 TABLET, FILM COATED ORAL at 22:01

## 2017-06-05 RX ADMIN — ATENOLOL SCH MG: 25 TABLET ORAL at 22:01

## 2017-06-05 RX ADMIN — APIXABAN SCH MG: 2.5 TABLET, FILM COATED ORAL at 09:33

## 2017-06-05 RX ADMIN — SODIUM CHLORIDE SCH MLS/HR: 450 INJECTION, SOLUTION INTRAVENOUS at 05:26

## 2017-06-05 RX ADMIN — ROPINIROLE HYDROCHLORIDE SCH MG: 0.25 TABLET, FILM COATED ORAL at 22:00

## 2017-06-05 RX ADMIN — ONDANSETRON PRN MG: 4 TABLET, ORALLY DISINTEGRATING ORAL at 11:39

## 2017-06-05 RX ADMIN — OXYCODONE HYDROCHLORIDE AND ACETAMINOPHEN PRN TAB: 10; 325 TABLET ORAL at 02:21

## 2017-06-05 RX ADMIN — PANCRELIPASE LIPASE, PANCRELIPASE PROTEASE, PANCRELIPASE AMYLASE SCH CAP: 10000; 32000; 42000 CAPSULE, DELAYED RELEASE ORAL at 16:33

## 2017-06-05 RX ADMIN — PANCRELIPASE LIPASE, PANCRELIPASE PROTEASE, PANCRELIPASE AMYLASE SCH CAP: 10000; 32000; 42000 CAPSULE, DELAYED RELEASE ORAL at 11:41

## 2017-06-05 RX ADMIN — PANTOPRAZOLE SODIUM SCH MG: 40 TABLET, DELAYED RELEASE ORAL at 16:35

## 2017-06-05 RX ADMIN — LISINOPRIL SCH MG: 10 TABLET ORAL at 09:33

## 2017-06-05 RX ADMIN — ATENOLOL SCH MG: 25 TABLET ORAL at 09:33

## 2017-06-05 RX ADMIN — FENTANYL SCH PATCH: 50 PATCH, EXTENDED RELEASE TRANSDERMAL at 09:36

## 2017-06-05 RX ADMIN — OXYCODONE HYDROCHLORIDE AND ACETAMINOPHEN PRN TAB: 10; 325 TABLET ORAL at 22:01

## 2017-06-05 NOTE — CONS
DATE OF CONSULTATION:  06/03/2017



HISTORY OF PRESENT ILLNESS:  This is a 77-year-old white female who was brought

into the Emergency Room with chest pain.



She says she started to have retrosternal chest discomfort yesterday morning. 

It gradually became worse and worse.  It then spread to both sides of her chest.

 She got concerned and came into the Emergency Room.



In the Emergency Room, an EKG was normal.  She does say that the pain then

radiated and came down into her abdomen.  She does not appear to be a good

historian.



She says she sustained a myocardial infarction in 2014.  Coronary arteriograms

were done, but there was some difficulty negotiating through the femoral

arteries.  In any case, it was accomplished and she was told that she has triple

vessel coronary artery disease and nothing could be done about it.  She has had

no chest pain since then.



She gives a history of hypotension.  There is no history of diabetes mellitus.



She was oxygenating at 95% on nasal cannula.



SOCIAL HISTORY:  She does not smoke or take alcohol.



PRESENT MEDICATIONS:

1.  Amiodarone 200 mg a day.

2.  Atenolol 50 mg twice a day.

3.  Diazepam 10 mg 4 times a day.

4.  Fentanyl patches.

5.  Furosemide 20 mg twice a day.

6.  Imdur 60 mg a day.

7.  Lisinopril 20 mg a day.

8.  Percocet.

9.  Potassium chloride 10 mEq a day.

10.  Xarelto 20 mg a day.

11.  Requip 0.5 mg a day.



PHYSICAL EXAMINATION:

GENERAL:  She was in no distress.

VITAL SIGNS:  The heart rate was 60 per minute and regular.  The blood pressure

was 100/50.

LUNGS:  Clear.

HEART:  The heart sounds were normal.  There was a grade 1/6 systolic murmur at

the base and a grade 2/4 diastolic murmur.  There was no S3 or S4.

EXTREMITIES:  There is no edema.



LABORATORY DATA:  She had significant abnormalities in her lab work.  Her BUN

yesterday was elevated to 163 and the creatinine was 4.8.  Her previous

creatinine has been between 1 and 2.  Today, the BUN is 142 and the creatinine

is 3.7.  The cardiac enzymes are negative.  The BNP is elevated to 1252.  The

lipase was elevated to 599.



DIAGNOSTIC DATA:  An EKG showed no significant changes.



Chest x-ray showed a normal-sized heart and chronic changes.  This is suggestive

of fibrosis other than CHF.



IMPRESSION:

1.  Chest pain and then abdominal pain, probably not cardiac in etiology because

of no objective findings on the EKG or cardiac enzymes.

2.  Consider acute pancreatitis.

3.  Acute renal failure, etiology unclear.

4.  Abnormal echocardiogram with a grade 2 diastolic dysfunction and aortic

regurgitation and mitral regurgitation, which could be the basis for congestive

heart failure, but no clinical or radiological findings of congestive heart

failure.



At this point, even though she does give a history of having had a myocardial

infarction in 2014 and came in with chest pain, I do not believe I would want to

pursue that further with an MPI.  She has other problems going on such as

possible acute pancreatitis and certainly in acute renal failure.  Thus, even if

the MPI is abnormal, there would be a hesitation in doing coronary arteriograms.

 At present, she has no chest pain.  I will certainly follow until Dr. Oneal

returns on 06/06/2017.



Thank you for asking us to see her.

 



______________________________

ANNIE WAGNER MD



DR:  SHAWNA/kendall  JOB#:  [id number] / [id number]

DD:  06/03/2017 13:50  DT:  06/04/2017 01:39

## 2017-06-05 NOTE — CARD
--------------- APPROVED REPORT --------------





EXAM: Two-dimensional and M-mode echocardiogram with Doppler and color Doppler.



Other Information 

Quality : Good



INDICATION

Chest Pain 



2D DIMENSIONS 

RVDd2.6 (2.9-3.5cm)Left Atrium(2D)3.2 (1.6-4.0cm)

IVSd1.2 (0.7-1.1cm)Aortic Root(2D)2.1 (2.0-3.7cm)

LVDd3.5 (3.9-5.9cm)LVOT Diameter2.0 (1.8-2.4cm)

PWd0.9 (0.7-1.1cm)LVDs2.4 (2.5-4.0cm)

FS (%) 30.6 %SV29.7 ml

LVEF(%)59.1 (>50%)



Aortic Valve

AoV Peak Yifan.121.9cm/sAoV VTI30.4cm

AO Peak GR.5.9mmHgLVOT  VTI 24.14cm

AO Mean GR.4mmHgAVA   (VTI)2.40cm2

AI P 1/2 [id number]



Mitral Valve

MV E Zpkwsuhp89.1cm/sMV DECEL [id number]

MV A Rajcnbgv22.6cm/sE/A  Ratio1.0



TDI

Lateral E' P. V8.88cm/sMedial E' P. V6.41cm/s

E/Lateral E'9.6E/Medial E'13.3



Tricuspid Valve

TR P. Yqqeluht393oc/sRAP [id number]

TR Peak Gr.[id number]



Pulmonary Vein

S1 Fupmuool26.3cm/sS2 Rrwyarbh08.93cm/s

D2 Feyjpvlj44.9cm/sPVa [id number]



 LEFT VENTRICLE 

The left ventricle is normal size. There is mild asymmetric septal left ventricular hypertrophy. The 
left ventricular systolic function is normal and the ejection fraction is within normal range. The Ej
ection Fraction is 55-60%. There is normal LV segmental wall motion.



 RIGHT VENTRICLE 

The right ventricle is normal size. The right ventricular systolic function is normal.



 ATRIA 

The left atrium size is normal. The right atrium size is normal. The interatrial septum is intact wit
h no evidence for an atrial septal defect or patent foramen ovale as noted on 2-D or Doppler imaging.




 AORTIC VALVE 

The aortic valve is calcified but opens well. Doppler and Color Flow revealed mild aortic regurgitati
on. There is no significant aortic valvular stenosis.



 MITRAL VALVE 

The mitral valve is calified and redundant. There is no evidence of mitral valve prolapse. There is n
o mitral valve stenosis. Doppler and Color-flow revealed mild mitral regurgitation.



 TRICUSPID VALVE 

The tricuspid valve is normal in structure and function. Doppler and Color Flow revealed mild tricusp
id regurgitation. There is moderate pulmonary hypertension. The PA pressure was estimated at 50 mmHg.
 There is no tricuspid valve stenosis.



 PULMONIC VALVE 

The pulmonary valve is normal in structure and function. Doppler and Color Flow revealed mild pulmoni
c valvular regurgitation. There is no pulmonic valvular stenosis.



 GREAT VESSELS 

The aortic root is normal in size. The ascending aorta is normal in size. The IVC is normal in size a
nd collapses >50% with inspiration.



 PERICARDIAL EFFUSION 

There is no evidence of significant pericardial effusion.



Critical Notification

Critical Value: No



<Conclusion>

The left ventricle is normal size.

There is mild asymmetric septal left ventricular hypertrophy.

The left ventricular systolic function is normal and the ejection fraction is within normal range.

The Ejection Fraction is 55-60%.

The diastolic function is normal.

There is no evidence of significant pericardial effusion.

There is no mitral valve stenosis.

Doppler and Color-flow revealed mild mitral regurgitation.

The left atrium is of a normal size

There is no significant aortic valvular stenosis.

Doppler and Color Flow revealed mild aortic regurgitation.

The right ventricle is of a normal size

Doppler and Color Flow revealed mild tricuspid regurgitation.

There is moderate pulmonary hypertension.

The PA pressure was estimated at 50 mmHg.

Doppler and Color Flow revealed mild pulmonic valvular regurgitation.

## 2017-06-05 NOTE — CONS
DATE OF CONSULTATION:  06/03/2017



HISTORY OF PRESENT ILLNESS:  The patient is a 77-year-old lady with history of

hypertension and CKD.  Baseline creatinine close to 2.  Hospitalized because of

chest pain.  This was ____ lasted for about 2-3 hours.  She has a history of

hypertension, coronary artery disease/atherosclerotic heart disease.  She had no

nausea, vomiting, diarrhea or diaphoresis.  No cough.  Symptoms were mostly

considered to be pericarditis versus ____ pneumonia.  No evidence of myocardial

infarction.  Her creatinine was elevated, requesting my consult.  She is denying

any blood in the urine.  She is actually even on aware of any previous kidney

problems, although some previous hospital records indicated CKD.



PAST MEDICAL HISTORY:  As above.



PAST SURGICAL HISTORY:  Significant for:

1.  Cholecystectomy.

2.  Hysterectomy.

3.  Right knee replacement.

4.  Lumbar laminectomy.

5.  Tonsillectomy.

6.  Hemorrhoidectomy.

7.  Cardiac stents.



REVIEW OF SYSTEMS:  As above, otherwise negative on a 10-point scale.



SOCIAL HISTORY:  No tobacco, alcohol or recreational drugs.



FAMILY HISTORY:  Noncontributory due to age.



PHYSICAL EXAMINATION:

GENERAL:  Elderly lady, appears in no distress or discomfort.

VITAL SIGNS:  Stable.  She is afebrile.

HEENT:  Pupils reactive.  Tongue is midline.

NECK:  Supple.

LUNGS:  Fairly good air entry.  No rhonchi, rales or wheezing.

CARDIOVASCULAR:  Regular rate and rhythm, no rub.

ABDOMEN:  Soft, nontender, no rebound, guarding or masses.  Bowel sounds active.

EXTREMITIES:  No edema.

NEUROLOGIC:  Nonfocal.



LABORATORY DATA:  Reviewed.



IMPRESSION:

1.  Acute renal failure likely tubular necrosis from compromised renal perfusion

and vasomotor renal disease.

2.  Chronic kidney disease likely stage II.

3.  Hypertension with chronic kidney disease.

4.  Chest pain, appears atypical, workup per primary service.



PLAN:  Continue gentle hydration.  Monitor intake and output.  Monitor labs. 

Discussed with patient.  Follow up daily with you.



Thank you very much for the consultation.  I appreciate the referral.

 



______________________________

ADELINE RODRIGUEZ MD



DR:  MAXIMILIANO/kendall  JOB#:  [id number] / [id number]

DD:  06/04/2017 15:08  DT:  06/05/2017 03:26

## 2017-06-05 NOTE — PDOC
Provider Note


Provider Note


RENAL F/U : MICHAEL





DOS : 6/4/17.





S : No new issues.


            


O : 


VSS


Afebrile.


Neck : Supple


Lungs : Non labored.


CVS : RRR


Abd : Benign in appearance, without distention.


Ext :  1+ edema


Neuro : Awake.





Labs reviewed.








A/P :


ARF/ATN


HRN w CKD


CKD STAGE I/II








Doing Ok


IVF.


CPM.











ADELINE RODRIGUEZ MD Jun 5, 2017 23:27

## 2017-06-05 NOTE — PDOC
GENERAL


General:


vss and afebrile. awake and alert and ongoing intermittent chest pain. BUN 

decreased to 50 and creatinine down to 1.6. tolerated full liquid diet. exam 

stable. mpi normal. continue same.


Problems:  





VITAL SIGNS


Vital Signs:





Vital Signs








  Date Time  Temp Pulse Resp B/P (MAP) Pulse Ox O2 Delivery O2 Flow Rate FiO2


 


6/5/17 20:00      Nasal Cannula 2.0 


 


6/5/17 15:56     96   


 


6/5/17 11:56 97.7 74 18 156/56 (89)    





 97.7       











I & O


I & O











Intake and Output 


 


 6/5/17





 07:00


 


Intake Total 1780 ml


 


Output Total 900 ml


 


Balance 880 ml


 


 


 


Intake Oral 780 ml


 


IV Total 1000 ml


 


Output Urine Total 900 ml


 


# Voids 1


 


# Bowel Movements 2











ALLERGIES


Allergies:





Allergies








Coded Allergies Type Severity Reaction Last Updated Verified


 


  diphenhydramine Allergy Severe Shortness of Air 6/1/16 Yes


 


  NSAIDS (Non-Steroidal Anti-Inflamma Allergy Intermediate All except Feldene 1/

5/15 Yes


 


  Pentazocine Lactate Allergy Intermediate Blood pressure increase, chest pain, 

heart races, itching. 1/5/15 Yes


 


  Trimethobenzamide HCl Allergy Intermediate Severe muscle spasms 1/5/15 Yes


 


  acetaminophen Allergy Intermediate Shallow breathing, slow heart rate, 

stomach pain 1/5/15 Yes


 


  adhesive Allergy Intermediate rash--"will explain." 1/5/15 Yes


 


  butorphanol tartrate Allergy Intermediate Hallucinations, HA, anxiety, rash 1/

5/15 Yes


 


  carisoprodol Allergy Intermediate Fainting, fast heart rate, light-headed 1/5/

15 Yes


 


  chlorzoxazone Allergy Intermediate Nausea, dizzy, rash, feels like I might 

pass out 1/5/15 Yes


 


  codeine Allergy Intermediate Shallow breathing, vomiting, severe HA, 

bradycardia, itching 1/5/15 Yes


 


  codeine phosphate Allergy Intermediate Shallow breathing, slow heart rate, 

stomach pain 1/5/15 Yes


 


  cyclobenzaprine HCl Allergy Intermediate Chest pain,HA, fast heart rate, 

nervous 1/5/15 Yes


 


  erythromycin base Allergy Intermediate Fainting, fast/irreg HR, rash, 

itiching 1/5/15 Yes


 


  hydromorphone HCl Allergy Intermediate Apneic 1/5/15 Yes


 


  iodine Allergy Intermediate Itching, skin rash, hives 1/5/15 Yes


 


  ketorolac tromethamine Allergy Intermediate Chest pain, sudden severe HA 1/5/

15 Yes


 


  levofloxacin Allergy Intermediate Severe dizziness, heart races, itching, 

stomach pain 1/5/15 Yes


 


  midazolam HCl Allergy Intermediate Slow heart rate, affects bld pressure, 

mild rash 1/5/15 Yes


 


  morphine Allergy Intermediate apneic 1/5/15 Yes


 


  nalbuphine HCl Allergy Intermediate Tachycardia, confusion, hallucinations, 

nervous 1/5/15 Yes


 


  I S O L A T I O N *CONTACT* Allergy Unknown  12/8/16 Yes











MEDS


Medications:





Current Medications








 Medications


  (Trade)  Dose


 Ordered  Sig/Laura  Start Time


 Stop Time Status Last Admin


Dose Admin


 


 Amiodarone HCl


  (Cordarone)  200 mg  DAILY  6/3/17 09:00


    6/4/17 09:12


200 MG


 


 Amylase/Lipase/


 Protease


  (Zenpep 10,000)  2 cap  TIDWMEALS  6/5/17 12:00


    6/5/17 16:33


2 CAP


 


 Apixaban


  (Eliquis)  2.5 mg  BID  6/3/17 11:00


    6/5/17 09:33


2.5 MG


 


 Atenolol


  (Tenormin)  25 mg  BID  6/2/17 21:00


    6/5/17 09:33


25 MG


 


 Diazepam


  (Valium)  10 mg  QID  6/5/17 17:00


    6/5/17 16:33


10 MG


 


 Fentanyl


  (Duragesic 50mcg/


 Hr Patch)  1 patch  Q3DAYS  6/5/17 09:00


    6/5/17 09:36


1 PATCH


 


 Fentanyl Citrate


  (Fentanyl 2ml


 Vial)  50 mcg  PRN Q2HR  PRN  6/2/17 16:45


 6/3/17 16:44 DC  


 


 


 Info


  (Anti-Coagulation


 Monitoring By


 Pharmacy)  1 each  PRN DAILY  PRN  6/3/17 10:45


     


 


 


 Isosorbide


 Mononitrate


  (Imdur)  30 mg  DAILY  6/3/17 09:00


    6/5/17 09:32


30 MG


 


 Lisinopril


  (Prinivil)  10 mg  DAILY  6/3/17 09:00


    6/5/17 09:33


10 MG


 


 Nitroglycerin


  (Nitrostat)  0.4 mg  PRN Q5MIN  PRN  6/2/17 20:30


     


 


 


 Nitroglycerin/


 Dextrose  250 ml @ 3


 mls/hr  1X  ONCE  6/2/17 16:15


 6/6/17 03:34   


 


 


 Non-Formulary


 Medication  50 mg  TID  PRN  6/2/17 20:30


   UNV  


 


 


 Ondansetron HCl


  (Zofran Odt)  4 mg  PRN Q6HRS  PRN  6/2/17 20:30


    6/5/17 11:39


4 MG


 


 Ondansetron HCl


  (Zofran)  4 mg  PRN Q8HRS  PRN  6/2/17 16:45


 6/3/17 16:44 DC  


 


 


 Oxycodone/


 Acetaminophen


  (Percocet 10/325)  1 tab  PRN QID  PRN  6/2/17 20:30


    6/5/17 15:56


1 TAB


 


 Pantoprazole


 Sodium


  (Protonix)  40 mg  DAILYAC  6/5/17 16:30


    6/5/17 16:35


40 MG


 


 Regadenoson


  (Lexiscan)  0.4 mg  1X  ONCE  6/5/17 09:30


 6/5/17 09:31 DC 6/5/17 11:18


0.4 MG


 


 Rivaroxaban


  (Xarelto)  15 mg  DAILYWSUP  6/3/17 17:00


   UNV  


 


 


 Ropinirole HCl


  (Requip)  0.25 mg  QHS  6/2/17 21:00


    6/4/17 20:46


0.25 MG


 


 Sodium Chloride  1,000 ml @ 


 50 mls/hr  Q20H  6/2/17 20:30


    6/5/17 05:26


50 MLS/HR


 


 Sodium Chloride


  (Normal Saline


 Flush)  10 ml  QSHIFT  PRN  6/2/17 16:15


     


 











LAB


Lab:





Laboratory Tests








Test


  6/5/17


05:35


 


Sodium Level


  143 mmol/L


(136-145)


 


Potassium Level


  4.0 mmol/L


(3.5-5.1)


 


Chloride Level


  113 mmol/L


()


 


Carbon Dioxide Level


  21 mmol/L


(21-32)


 


Anion Gap 9 (6-14) 


 


Blood Urea Nitrogen


  50 mg/dL


(7-20)


 


Creatinine


  1.6 mg/dL


(0.6-1.0)


 


Estimated GFR


(Cockcroft-Gault) 31.1 


 


 


Glucose Level


  85 mg/dL


(70-99)


 


Calcium Level


  8.2 mg/dL


(8.5-10.1)


 


Triglycerides Level


  136 mg/dL


(0-150)


 


Cholesterol Level


  111 mg/dL


(0-200)


 


LDL Cholesterol, Calculated


  51 mg/dL


(0-100)


 


VLDL Cholesterol, Calculated


  27 mg/dL


(0-40)


 


Non-HDL Cholesterol Calculated


  78 mg/dL


(0-129)


 


HDL Cholesterol


  33 mg/dL


(40-60)


 


Cholesterol/HDL Ratio 3.4 


 


Lipase


  330 U/L


()

















KRISTIN LINDSAY MD Jun 5, 2017 22:02

## 2017-06-05 NOTE — PDOC2
GI CONSULT


Reason For Consult:


Pancreatitis





HPI:


HPI:


78 y/o female known to GI.  Admitted 6/2/17 w/ chest pain radiating to both 

arms.  Feels like she's "having a heart attack."  Cardiology is following, 

plans for stress test today.  Renal also following for acute renal failure.  GI 

asked to see re: elevated lipase of 599 which is now normal.  LFTs were also 

normal except Alk Phos 157.  Since admission, chest pain (substernal) comes and 

goes randomly although was possibly worse after trying full liquids last night.

  Does not radiate toward abdomen or back (although does have low back pain - 

also note cardiology consult reports she did say pain radiated to abdomen).  

Has felt "lousy" x 3 months, possibly w/ decreased appetite and weight loss per 

her family.  H/o diarrhea possibly related to pancreatic insufficiency as 

improved w/ Zenpep TID; current bowel pattern is alternating constipation ("for 

days") and diarrhea (last BM 2 days ago).  Last colonoscopy in 2009, 

collagenous colitis on biopsies w/ attempted treatment w/ 5-ASA, steroids, and 

bile-binders.  H/o "esophagus issues" in the past, has had previous EGD.  No 

reflux/heartburn/dysphagia now, but does take viscous lidocaine and Tums QHS 

PRN for "pain."





PMH:


PMH:


CAD, CHF, HTN, MI, HLD, COPD, reflex sympathetic dystrophy, collagenous colitis

, ?pancreatic insufficiency, hemorrhoids, diarrhea/constipation, osteoporosis, 

CKD, appendectomy, cholecystectomy hernia repair, total knee replacement, 

tonsillectomy, hysterectomy





FH:


Family History:  Cancer (colon - father), Other (colon polyps - sister, 

alcoholic pancreatitis - son)





Social History:


Smoke:  No


ALCOHOL:  none


Drugs:  None





ROS:





GEN: Denies fevers, chills, sweats


HEENT: Denies blurred vision, sore throat


CV: +chest pain


RESP: Denies shortness of air, cough


GI: Per HPI


: Denies hematuria, dysuria


ENDO: ?weight loss


NEURO: Denies confusion, dizziness


MSK: +low back pain


SKIN: Denies jaundice, pruritus





Vitals:


Vitals:





 Vital Signs








  Date Time  Temp Pulse Resp B/P (MAP) Pulse Ox O2 Delivery O2 Flow Rate FiO2


 


6/5/17 09:40     96 Nasal Cannula 2.0 


 


6/5/17 09:33  56  137/43    


 


6/5/17 07:00 98.8  16     





 98.8       











Labs:


Labs:





Laboratory Tests








Test


  6/4/17


10:28 6/5/17


05:35


 


Sodium Level


  145 mmol/L


(136-145) 143 mmol/L


(136-145)


 


Potassium Level


  4.6 mmol/L


(3.5-5.1) 4.0 mmol/L


(3.5-5.1)


 


Chloride Level


  113 mmol/L


() 113 mmol/L


()


 


Carbon Dioxide Level


  21 mmol/L


(21-32) 21 mmol/L


(21-32)


 


Anion Gap 11 (6-14)  9 (6-14) 


 


Blood Urea Nitrogen


  83 mg/dL


(7-20) 50 mg/dL


(7-20)


 


Creatinine


  2.0 mg/dL


(0.6-1.0) 1.6 mg/dL


(0.6-1.0)


 


Estimated GFR


(Cockcroft-Gault) 24.0 


  31.1 


 


 


Glucose Level


  84 mg/dL


(70-99) 85 mg/dL


(70-99)


 


Calcium Level


  8.7 mg/dL


(8.5-10.1) 8.2 mg/dL


(8.5-10.1)


 


Lipase


  321 U/L


() 330 U/L


()


 


Triglycerides Level


  


  136 mg/dL


(0-150)


 


Cholesterol Level


  


  111 mg/dL


(0-200)


 


LDL Cholesterol, Calculated


  


  51 mg/dL


(0-100)


 


VLDL Cholesterol, Calculated


  


  27 mg/dL


(0-40)


 


Non-HDL Cholesterol Calculated


  


  78 mg/dL


(0-129)


 


HDL Cholesterol


  


  33 mg/dL


(40-60)


 


Cholesterol/HDL Ratio  3.4 











Allergies:


Coded Allergies:  


     diphenhydramine (Verified  Allergy, Severe, Shortness of Air, 6/1/16)


 Patient gets shortness of air and breaks out in hives.


     NSAIDS (Non-Steroidal Anti-Inflamma (Verified  Allergy, Intermediate, All 

except Feldene, 1/5/15)


     Pentazocine Lactate (Verified  Allergy, Intermediate, Blood pressure 

increase, chest pain, heart races, itching., 1/5/15)


     Trimethobenzamide HCl (Verified  Allergy, Intermediate, Severe muscle 

spasms, 1/5/15)


     acetaminophen (Verified  Allergy, Intermediate, Shallow breathing, slow 

heart rate, stomach pain, 1/5/15)


     adhesive (Verified  Allergy, Intermediate, rash--"will explain.", 1/5/15)


 "Adhesive tape" & "paper tape" Pt's list reads:


 "rash----will explain."


     butorphanol tartrate (Verified  Allergy, Intermediate, Hallucinations, HA, 

anxiety, rash, 1/5/15)


     carisoprodol (Verified  Allergy, Intermediate, Fainting, fast heart rate, 

light-headed, 1/5/15)


     chlorzoxazone (Verified  Allergy, Intermediate, Nausea, dizzy, rash, feels 

like I might pass out, 1/5/15)


     codeine (Verified  Allergy, Intermediate, Shallow breathing, vomiting, 

severe HA, bradycardia, itching, 1/5/15)


     codeine phosphate (Verified  Allergy, Intermediate, Shallow breathing, 

slow heart rate, stomach pain, 1/5/15)


     cyclobenzaprine HCl (Verified  Allergy, Intermediate, Chest pain,HA, fast 

heart rate, nervous, 1/5/15)


     erythromycin base (Verified  Allergy, Intermediate, Fainting, fast/irreg HR

, rash, itiching, 1/5/15)


     hydromorphone HCl (Verified  Allergy, Intermediate, Apneic, 1/5/15)


     iodine (Verified  Allergy, Intermediate, Itching, skin rash, hives, 1/5/15)


     ketorolac tromethamine (Verified  Allergy, Intermediate, Chest pain, 

sudden severe HA, 1/5/15)


     levofloxacin (Verified  Allergy, Intermediate, Severe dizziness, heart 

races, itching, stomach pain, 1/5/15)


     midazolam HCl (Verified  Allergy, Intermediate, Slow heart rate, affects 

bld pressure, mild rash, 1/5/15)


     morphine (Verified  Allergy, Intermediate, apneic, 1/5/15)


     nalbuphine HCl (Verified  Allergy, Intermediate, Tachycardia, confusion, 

hallucinations, nervous, 1/5/15)


     I S O L A T I O N *CONTACT* (Verified  Allergy, Unknown, 12/8/16)


 ESBL





Medications:





Current Medications








 Medications


  (Trade)  Dose


 Ordered  Sig/Laura


 Route


 PRN Reason  Start Time


 Stop Time Status Last Admin


Dose Admin


 


 Fentanyl


  (Duragesic 50mcg/


 Hr Patch)  1 patch  Q3DAYS


 TD


   6/5/17 09:00


    6/5/17 09:36


 











Imaging:


Imaging:


CXR


IMPRESSION: Chronic changes. No acute finding. No significant change.





RUQ US


IMPRESSION: 


Postcholecystectomy with prominent intrahepatic and extrahepatic bile ducts. 

This is commonly seen postcholecystectomy but would correlate with symptoms and 

lab markers to ensure there is not a distal obstructive process.





Right Hip X-Ray


Impression: No acute abnormality is detected.





PE:





GEN: NAD


HEENT: Atraumatic, PERRL


LUNGS: clear anteriorly


HEART: RRR


ABD: NABS, S/ND/NT


EXTREMITY: No edema


SKIN: wound right hip


NEURO/PSYCH: A & O 3, very talkative, poor historian





A/P:


A/P:


Elevated lipase - resolved


-s/p cholecystectomy





Chest pain, acute renal failure


-per cardiology, nephrology





?pancreatic insufficiency


-h/o diarrhea and collagenous colitis improved w/ pancreatic enzymes


-last colonoscopy 2009





?dyspepsia/abd pain - currently denies


-takes viscous lidocaine and Tums PRN QHS


-reports previous EGD





Right hip wound


-per primary





--


Await stress test.


Other per Dr. Egan.











JAZMIN ASHFORD Jun 5, 2017 09:58

## 2017-06-05 NOTE — PDOC
Provider Note


Provider Note


Covering for Dr. Oneal.


MPI showed no evidence of myocardial ischemia.


Chest pain is probably not due to a cardiac cause.


Will sign off.


Thank you for asking Dr. Oneal to see her.











ANNIE WAGNER MD Jun 5, 2017 21:39

## 2017-06-05 NOTE — PDOC
Provider Note


Provider Note


RENAL F/U : MICHAEL





DOS : 6/5/17.





S : No new issues.


            


O : 


VSS


Afebrile.


Neck : Supple


Lungs : Non labored.


CVS : RRR


Abd : Benign in appearance, without distention.


Ext :  1+ edema


Neuro : Awake.





Labs reviewed.








A/P :


ARF/ATN


HTN w CKD


CKD II





Doing Ok


Cr better.


Supportive care.


CPM.











ADELINE RODRIGUEZ MD Jun 5, 2017 23:28

## 2017-06-05 NOTE — RAD
--------------- APPROVED REPORT --------------





Test Type:          Pharmacological

Stress Nurse/Tech: Radha Conway R.N.

Test Indications: chest pain

Cardiac History: Hypertension, MI in 2014

Medications:     See Electronic Medical Record

Medical History: See Electronic Medical Record

Resting ECG:     LBBB

Resting Heart Rate: 56 bpm

Resting Blood Pressure: 163/62mmHg

Pretest Chest Pain: No chest pain



Nurse/Tech Notes

S1S2, lungs sound clear

Consent: The procedure was explained to the patient in lay terms. Informed consent was witnessed. Osman
eout was entered into [employer]. History and Stress Test performed by Radha Conway R.N.



Pharm. Details

Pharmacologic stress testing was performed using 0.4mg per 5ml of regadenoson given intravenously ove
r 7-10 seconds.



Stress Symptoms

Dyspnea, Nausea, Chest pain

Chest pain typical of angina occurred (Severity rated at 10 , cont. till pt gpt back to room min dura
tion).



POST EXERCISE

Reason for Termination: Infusion complete

Max HR: 82 bpm

Max Blood Pressure: 148/63mmHg

Blood Pressure response to exercise: Normal blood pressure response during stress.

Chest Pain: Yes. 

Arrhythmia: No. 

ST Change: No. 



Imaging Protocol

IMAGE PROTOCOL: Rest Tc-99m/stress Tc-99m 1 day



Rest:            Stress:         Viability:   

Radiopharm.Tc99m OpztmxqvoTc60t Sestamibi

[id number]            33mCi            

Img Date  06/05/2017 06/05/2017      

Inj-Img [id number].           60min.           



Rest Admin Site:portAdministrator:RT Sadia (R)(N)

Stress Admin Site: portAdministrator: RT Sadia (R)(N)



STRESS DATA

End Diast. Vol.49.0mlAv. Heart Rate67.0bpm

End Syst. Vol.13.0mlCO Index BSA0.0L/min

Myocardial Mass98.0gEject. Lxtlztvv21.0%



Stress Rates

Pk. Fill Rate2.78EDV/secLVtime Pk. Fill 222.87msec

Pk. Empty Rate3.64ESV/secLVtime Pk. Cokcr553.45msec

1/3 Pk. Fill0.84EDV/sec



Stress Scores

Regional WT1.00Summed WT7.00

Regional WM0.00Summed WM0.00



LV Perf. Quant

17 Seg. SSS3.00

17 Seg. SRS18.00

17 Seg. SDS1.00

Stress Defect Extent (% LAD)0.00Rest Defect Extent (% LAD)35.00Rev. Defect Extent (% LAD)0.00

Stress Defect Extent (% LCX) 40.00Rest Defect Extent (% LCX)66.30Rev. Defect Extent (% LCX)18.80


Stress Defect Extent (% RCA)0.00Rest Defect Extent (% RCA)23.30Rev. Defect Extent (% RCA)0.00

Stress Defect Extent (% POPEYE)7.00Rest Defect Extent (% POPEYE)42.60Rev. Defect Extent (% POPEYE)3.30



Conclusion

1. No perfusion defects seen with stress imaging, indicating that there is no evidence of myocardial 
ischemia.

2. Normal wall motion and wall thickening with an ejection fraction of 73%.

## 2017-06-06 VITALS — DIASTOLIC BLOOD PRESSURE: 59 MMHG | SYSTOLIC BLOOD PRESSURE: 135 MMHG

## 2017-06-06 VITALS — SYSTOLIC BLOOD PRESSURE: 136 MMHG | DIASTOLIC BLOOD PRESSURE: 62 MMHG

## 2017-06-06 VITALS — SYSTOLIC BLOOD PRESSURE: 162 MMHG | DIASTOLIC BLOOD PRESSURE: 77 MMHG

## 2017-06-06 VITALS — SYSTOLIC BLOOD PRESSURE: 133 MMHG | DIASTOLIC BLOOD PRESSURE: 54 MMHG

## 2017-06-06 VITALS — SYSTOLIC BLOOD PRESSURE: 145 MMHG | DIASTOLIC BLOOD PRESSURE: 78 MMHG

## 2017-06-06 VITALS — DIASTOLIC BLOOD PRESSURE: 55 MMHG | SYSTOLIC BLOOD PRESSURE: 138 MMHG

## 2017-06-06 LAB
ANION GAP SERPL CALC-SCNC: 8 MMOL/L (ref 6–14)
BUN SERPL-MCNC: 26 MG/DL (ref 7–20)
CALCIUM SERPL-MCNC: 8.4 MG/DL (ref 8.5–10.1)
CHLORIDE SERPL-SCNC: 115 MMOL/L (ref 98–107)
CO2 SERPL-SCNC: 22 MMOL/L (ref 21–32)
CREAT SERPL-MCNC: 1.3 MG/DL (ref 0.6–1)
GFR SERPLBLD BASED ON 1.73 SQ M-ARVRAT: 39.5 ML/MIN
GLUCOSE SERPL-MCNC: 105 MG/DL (ref 70–99)
POTASSIUM SERPL-SCNC: 4.5 MMOL/L (ref 3.5–5.1)
SODIUM SERPL-SCNC: 145 MMOL/L (ref 136–145)

## 2017-06-06 RX ADMIN — ISOSORBIDE MONONITRATE SCH MG: 30 TABLET, EXTENDED RELEASE ORAL at 09:17

## 2017-06-06 RX ADMIN — PANCRELIPASE LIPASE, PANCRELIPASE PROTEASE, PANCRELIPASE AMYLASE SCH CAP: 10000; 32000; 42000 CAPSULE, DELAYED RELEASE ORAL at 12:34

## 2017-06-06 RX ADMIN — ONDANSETRON PRN MG: 4 TABLET, ORALLY DISINTEGRATING ORAL at 02:34

## 2017-06-06 RX ADMIN — APIXABAN SCH MG: 2.5 TABLET, FILM COATED ORAL at 20:18

## 2017-06-06 RX ADMIN — OXYCODONE HYDROCHLORIDE AND ACETAMINOPHEN PRN TAB: 10; 325 TABLET ORAL at 20:18

## 2017-06-06 RX ADMIN — PANCRELIPASE LIPASE, PANCRELIPASE PROTEASE, PANCRELIPASE AMYLASE SCH CAP: 10000; 32000; 42000 CAPSULE, DELAYED RELEASE ORAL at 09:15

## 2017-06-06 RX ADMIN — PANTOPRAZOLE SODIUM SCH MG: 40 TABLET, DELAYED RELEASE ORAL at 05:06

## 2017-06-06 RX ADMIN — ATENOLOL SCH MG: 25 TABLET ORAL at 09:18

## 2017-06-06 RX ADMIN — OXYCODONE HYDROCHLORIDE AND ACETAMINOPHEN PRN TAB: 10; 325 TABLET ORAL at 13:28

## 2017-06-06 RX ADMIN — ATENOLOL SCH MG: 25 TABLET ORAL at 20:18

## 2017-06-06 RX ADMIN — PANCRELIPASE LIPASE, PANCRELIPASE PROTEASE, PANCRELIPASE AMYLASE SCH CAP: 10000; 32000; 42000 CAPSULE, DELAYED RELEASE ORAL at 17:09

## 2017-06-06 RX ADMIN — AMIODARONE HYDROCHLORIDE SCH MG: 200 TABLET ORAL at 09:16

## 2017-06-06 RX ADMIN — OXYCODONE HYDROCHLORIDE AND ACETAMINOPHEN PRN TAB: 10; 325 TABLET ORAL at 05:06

## 2017-06-06 RX ADMIN — SODIUM CHLORIDE SCH MLS/HR: 450 INJECTION, SOLUTION INTRAVENOUS at 02:39

## 2017-06-06 RX ADMIN — ROPINIROLE HYDROCHLORIDE SCH MG: 0.25 TABLET, FILM COATED ORAL at 20:18

## 2017-06-06 RX ADMIN — APIXABAN SCH MG: 2.5 TABLET, FILM COATED ORAL at 09:16

## 2017-06-06 RX ADMIN — SODIUM CHLORIDE SCH MLS/HR: 450 INJECTION, SOLUTION INTRAVENOUS at 20:19

## 2017-06-06 RX ADMIN — LISINOPRIL SCH MG: 10 TABLET ORAL at 09:17

## 2017-06-06 RX ADMIN — ONDANSETRON PRN MG: 4 TABLET, ORALLY DISINTEGRATING ORAL at 15:51

## 2017-06-06 NOTE — PDOC
Provider Note


Provider Note


RENAL F?U : MICHAEL





S : No new issues.


            


O : 


VSS


Afebrile.


Neck : Supple


Lungs : Non labored.


CVS : RRR


Abd : Benign in appearance, without distention.


Ext :  1+ edema


Neuro : Awake.





Labs reviewed.








A/P :


ARF/ATN


HTN w CKD


CKD II





Doing Ok


Cr now 1.2





Supportive care.


CPM.











ADELINE RODRIGUEZ MD Jun 6, 2017 23:53

## 2017-06-06 NOTE — PDOC
GENERAL


General:


vss and afebrile. awake and alert and daughter in attendance. eating well. BUN 

decreased to 26 and creatinine down to 1.3. MPI negative with normal EF. right 

hip wound much less red likely related to mattress here in hospital. will await 

dermatology opinion on same. chest clear and heart regular. with normalizing 

renal function will be able to resume xarelto on discharge.


Problems:  





VITAL SIGNS


Vital Signs:





Vital Signs








  Date Time  Temp Pulse Resp B/P (MAP) Pulse Ox O2 Delivery O2 Flow Rate FiO2


 


6/6/17 05:06   18  96 Nasal Cannula 2.0 


 


6/6/17 03:00 98.1 69  133/54 (80)    





 98.1       











I & O


I & O











Intake and Output 


 


 6/6/17





 07:00


 


Intake Total 2710 ml


 


Output Total 900 ml


 


Balance 1810 ml


 


 


 


Intake Oral 1710 ml


 


IV Total 1000 ml


 


Output Urine Total 900 ml


 


# Voids 2


 


# Bowel Movements 1











ALLERGIES


Allergies:





Allergies








Coded Allergies Type Severity Reaction Last Updated Verified


 


  diphenhydramine Allergy Severe Shortness of Air 6/1/16 Yes


 


  NSAIDS (Non-Steroidal Anti-Inflamma Allergy Intermediate All except Feldene 1/

5/15 Yes


 


  Pentazocine Lactate Allergy Intermediate Blood pressure increase, chest pain, 

heart races, itching. 1/5/15 Yes


 


  Trimethobenzamide HCl Allergy Intermediate Severe muscle spasms 1/5/15 Yes


 


  acetaminophen Allergy Intermediate Shallow breathing, slow heart rate, 

stomach pain 1/5/15 Yes


 


  adhesive Allergy Intermediate rash--"will explain." 1/5/15 Yes


 


  butorphanol tartrate Allergy Intermediate Hallucinations, HA, anxiety, rash 1/

5/15 Yes


 


  carisoprodol Allergy Intermediate Fainting, fast heart rate, light-headed 1/5/

15 Yes


 


  chlorzoxazone Allergy Intermediate Nausea, dizzy, rash, feels like I might 

pass out 1/5/15 Yes


 


  codeine Allergy Intermediate Shallow breathing, vomiting, severe HA, 

bradycardia, itching 1/5/15 Yes


 


  codeine phosphate Allergy Intermediate Shallow breathing, slow heart rate, 

stomach pain 1/5/15 Yes


 


  cyclobenzaprine HCl Allergy Intermediate Chest pain,HA, fast heart rate, 

nervous 1/5/15 Yes


 


  erythromycin base Allergy Intermediate Fainting, fast/irreg HR, rash, 

itiching 1/5/15 Yes


 


  hydromorphone HCl Allergy Intermediate Apneic 1/5/15 Yes


 


  iodine Allergy Intermediate Itching, skin rash, hives 1/5/15 Yes


 


  ketorolac tromethamine Allergy Intermediate Chest pain, sudden severe HA 1/5/

15 Yes


 


  levofloxacin Allergy Intermediate Severe dizziness, heart races, itching, 

stomach pain 1/5/15 Yes


 


  midazolam HCl Allergy Intermediate Slow heart rate, affects bld pressure, 

mild rash 1/5/15 Yes


 


  morphine Allergy Intermediate apneic 1/5/15 Yes


 


  nalbuphine HCl Allergy Intermediate Tachycardia, confusion, hallucinations, 

nervous 1/5/15 Yes


 


  I S O L A T I O N *CONTACT* Allergy Unknown  12/8/16 Yes











MEDS


Medications:





Current Medications








 Medications


  (Trade)  Dose


 Ordered  Sig/Laura  Start Time


 Stop Time Status Last Admin


Dose Admin


 


 Amiodarone HCl


  (Cordarone)  200 mg  DAILY  6/3/17 09:00


    6/4/17 09:12


200 MG


 


 Amylase/Lipase/


 Protease


  (Zenpep 10,000)  2 cap  TIDWMEALS  6/5/17 12:00


    6/5/17 16:33


2 CAP


 


 Apixaban


  (Eliquis)  2.5 mg  BID  6/3/17 11:00


    6/5/17 22:01


2.5 MG


 


 Atenolol


  (Tenormin)  25 mg  BID  6/2/17 21:00


    6/5/17 22:01


25 MG


 


 Diazepam


  (Valium)  10 mg  QID  6/5/17 17:00


    6/5/17 22:00


10 MG


 


 Fentanyl


  (Duragesic 50mcg/


 Hr Patch)  1 patch  Q3DAYS  6/5/17 09:00


    6/5/17 09:36


1 PATCH


 


 Fentanyl Citrate


  (Fentanyl 2ml


 Vial)  50 mcg  PRN Q2HR  PRN  6/2/17 16:45


 6/3/17 16:44 DC  


 


 


 Info


  (Anti-Coagulation


 Monitoring By


 Pharmacy)  1 each  PRN DAILY  PRN  6/3/17 10:45


     


 


 


 Isosorbide


 Mononitrate


  (Imdur)  30 mg  DAILY  6/3/17 09:00


    6/5/17 09:32


30 MG


 


 Lisinopril


  (Prinivil)  10 mg  DAILY  6/3/17 09:00


    6/5/17 09:33


10 MG


 


 Nitroglycerin


  (Nitrostat)  0.4 mg  PRN Q5MIN  PRN  6/2/17 20:30


     


 


 


 Nitroglycerin/


 Dextrose  250 ml @ 3


 mls/hr  1X  ONCE  6/2/17 16:15


 6/6/17 03:34 DC  


 


 


 Non-Formulary


 Medication  50 mg  TID  PRN  6/2/17 20:30


   UNV  


 


 


 Ondansetron HCl


  (Zofran Odt)  4 mg  PRN Q6HRS  PRN  6/2/17 20:30


    6/6/17 02:34


4 MG


 


 Ondansetron HCl


  (Zofran)  4 mg  PRN Q8HRS  PRN  6/2/17 16:45


 6/3/17 16:44 DC  


 


 


 Oxycodone/


 Acetaminophen


  (Percocet 10/325)  1 tab  PRN QID  PRN  6/2/17 20:30


    6/6/17 05:06


1 TAB


 


 Pantoprazole


 Sodium


  (Protonix)  40 mg  DAILYAC  6/5/17 16:30


    6/6/17 05:06


40 MG


 


 Regadenoson


  (Lexiscan)  0.4 mg  1X  ONCE  6/5/17 09:30


 6/5/17 09:31 DC 6/5/17 11:18


0.4 MG


 


 Rivaroxaban


  (Xarelto)  15 mg  DAILYWSUP  6/3/17 17:00


   UNV  


 


 


 Ropinirole HCl


  (Requip)  0.25 mg  QHS  6/2/17 21:00


    6/5/17 22:00


0.25 MG


 


 Sodium Chloride  1,000 ml @ 


 50 mls/hr  Q20H  6/2/17 20:30


    6/6/17 02:39


50 MLS/HR


 


 Sodium Chloride


  (Normal Saline


 Flush)  10 ml  QSHIFT  PRN  6/2/17 16:15


     


 











LAB


Lab:





Laboratory Tests








Test


  6/6/17


07:05


 


Sodium Level


  145 mmol/L


(136-145)


 


Potassium Level


  4.5 mmol/L


(3.5-5.1)


 


Chloride Level


  115 mmol/L


()


 


Carbon Dioxide Level


  22 mmol/L


(21-32)


 


Anion Gap 8 (6-14) 


 


Blood Urea Nitrogen


  26 mg/dL


(7-20)


 


Creatinine


  1.3 mg/dL


(0.6-1.0)


 


Estimated GFR


(Cockcroft-Gault) 39.5 


 


 


Glucose Level


  105 mg/dL


(70-99)


 


Calcium Level


  8.4 mg/dL


(8.5-10.1)

















KRISTIN LINDSAY MD Jun 6, 2017 08:06

## 2017-06-06 NOTE — PDOC
Subjective:


Subjective:


Says fell twice earlier.


Abd pain today.





Objective:


Vital Signs:





 Vital Signs








  Date Time  Temp Pulse Resp B/P (MAP) Pulse Ox O2 Delivery O2 Flow Rate FiO2


 


6/6/17 11:00 98.0 65 18 138/55 (82) 95 Room Air  





 98.0       


 


6/6/17 08:00       2.0 








Labs:





Laboratory Tests








Test


  6/6/17


07:05


 


Sodium Level 145 mmol/L 


 


Potassium Level 4.5 mmol/L 


 


Chloride Level 115 mmol/L 


 


Carbon Dioxide Level 22 mmol/L 


 


Anion Gap 8 


 


Blood Urea Nitrogen 26 mg/dL 


 


Creatinine 1.3 mg/dL 


 


Estimated GFR


(Cockcroft-Gault) 39.5 


 


 


Glucose Level 105 mg/dL 


 


Calcium Level 8.4 mg/dL 








Imaging:


MPI 6/15/17


Conclusion


1. No perfusion defects seen with stress imaging, indicating that there is no 

evidence of myocardial ischemia.


2. Normal wall motion and wall thickening with an ejection fraction of 73%.





PE:





GEN: NAD, up to chair


LUNGS: clear


HEART: RRR


ABD: periumbilical tenderness, also some RLQ to right hip


NEURO/PSYCH: A & O 3





A/P:


GERD/dyspepsia/abd pain 


-takes viscous lidocaine and Tums PRN QHS at home


-started PPI here yesterday





Chest pain, acute renal failure


-stress test ok, Cr improving





?pancreatic insufficiency


-h/o diarrhea improved w/ pancreatic enzymes


-additional h/o collagenous colitis, last colonoscopy 2009





Falls





--


Continue PPI QD.











JAZMIN ASHFORD Jun 6, 2017 12:29

## 2017-06-07 VITALS — DIASTOLIC BLOOD PRESSURE: 63 MMHG | SYSTOLIC BLOOD PRESSURE: 147 MMHG

## 2017-06-07 VITALS — SYSTOLIC BLOOD PRESSURE: 122 MMHG | DIASTOLIC BLOOD PRESSURE: 60 MMHG

## 2017-06-07 VITALS — SYSTOLIC BLOOD PRESSURE: 122 MMHG | DIASTOLIC BLOOD PRESSURE: 52 MMHG

## 2017-06-07 VITALS — DIASTOLIC BLOOD PRESSURE: 55 MMHG | SYSTOLIC BLOOD PRESSURE: 144 MMHG

## 2017-06-07 VITALS — DIASTOLIC BLOOD PRESSURE: 61 MMHG | SYSTOLIC BLOOD PRESSURE: 143 MMHG

## 2017-06-07 VITALS — SYSTOLIC BLOOD PRESSURE: 168 MMHG | DIASTOLIC BLOOD PRESSURE: 59 MMHG

## 2017-06-07 LAB
ANION GAP SERPL CALC-SCNC: 7 MMOL/L (ref 6–14)
BACTERIA #/AREA URNS HPF: (no result) /HPF
BILIRUB UR QL STRIP: NEGATIVE
BUN SERPL-MCNC: 18 MG/DL (ref 7–20)
CALCIUM SERPL-MCNC: 8 MG/DL (ref 8.5–10.1)
CHLORIDE SERPL-SCNC: 113 MMOL/L (ref 98–107)
CO2 SERPL-SCNC: 25 MMOL/L (ref 21–32)
CREAT SERPL-MCNC: 1.4 MG/DL (ref 0.6–1)
GFR SERPLBLD BASED ON 1.73 SQ M-ARVRAT: 36.3 ML/MIN
GLUCOSE SERPL-MCNC: 107 MG/DL (ref 70–99)
GLUCOSE UR STRIP-MCNC: NEGATIVE MG/DL
NITRITE UR QL STRIP: POSITIVE
PH UR STRIP: 8 [PH]
POTASSIUM SERPL-SCNC: 4.3 MMOL/L (ref 3.5–5.1)
PROT UR STRIP-MCNC: NEGATIVE MG/DL
RBC #/AREA URNS HPF: 0 /HPF (ref 0–2)
SODIUM SERPL-SCNC: 145 MMOL/L (ref 136–145)
SP GR UR STRIP: 1.02
SQUAMOUS #/AREA URNS LPF: (no result) /LPF
UROBILINOGEN UR-MCNC: 0.2 MG/DL
WBC #/AREA URNS HPF: (no result) /HPF (ref 0–4)

## 2017-06-07 PROCEDURE — 0HBHXZX EXCISION OF RIGHT UPPER LEG SKIN, EXTERNAL APPROACH, DIAGNOSTIC: ICD-10-PCS

## 2017-06-07 RX ADMIN — ATENOLOL SCH MG: 25 TABLET ORAL at 09:02

## 2017-06-07 RX ADMIN — ISOSORBIDE MONONITRATE SCH MG: 30 TABLET, EXTENDED RELEASE ORAL at 09:01

## 2017-06-07 RX ADMIN — OXYCODONE HYDROCHLORIDE AND ACETAMINOPHEN PRN TAB: 10; 325 TABLET ORAL at 22:09

## 2017-06-07 RX ADMIN — PANCRELIPASE LIPASE, PANCRELIPASE PROTEASE, PANCRELIPASE AMYLASE SCH CAP: 10000; 32000; 42000 CAPSULE, DELAYED RELEASE ORAL at 12:22

## 2017-06-07 RX ADMIN — OXYCODONE HYDROCHLORIDE AND ACETAMINOPHEN PRN TAB: 10; 325 TABLET ORAL at 14:04

## 2017-06-07 RX ADMIN — PANTOPRAZOLE SODIUM SCH MG: 40 TABLET, DELAYED RELEASE ORAL at 05:55

## 2017-06-07 RX ADMIN — APIXABAN SCH MG: 2.5 TABLET, FILM COATED ORAL at 09:01

## 2017-06-07 RX ADMIN — SODIUM CHLORIDE SCH MLS/HR: 450 INJECTION, SOLUTION INTRAVENOUS at 17:02

## 2017-06-07 RX ADMIN — PANCRELIPASE LIPASE, PANCRELIPASE PROTEASE, PANCRELIPASE AMYLASE SCH CAP: 10000; 32000; 42000 CAPSULE, DELAYED RELEASE ORAL at 08:59

## 2017-06-07 RX ADMIN — LISINOPRIL SCH MG: 10 TABLET ORAL at 09:02

## 2017-06-07 RX ADMIN — ONDANSETRON PRN MG: 4 TABLET, ORALLY DISINTEGRATING ORAL at 05:55

## 2017-06-07 RX ADMIN — ATENOLOL SCH MG: 25 TABLET ORAL at 22:09

## 2017-06-07 RX ADMIN — ONDANSETRON PRN MG: 4 TABLET, ORALLY DISINTEGRATING ORAL at 20:44

## 2017-06-07 RX ADMIN — ROPINIROLE HYDROCHLORIDE SCH MG: 0.25 TABLET, FILM COATED ORAL at 22:08

## 2017-06-07 RX ADMIN — APIXABAN SCH MG: 2.5 TABLET, FILM COATED ORAL at 22:08

## 2017-06-07 RX ADMIN — OXYCODONE HYDROCHLORIDE AND ACETAMINOPHEN PRN TAB: 10; 325 TABLET ORAL at 05:55

## 2017-06-07 RX ADMIN — PANCRELIPASE LIPASE, PANCRELIPASE PROTEASE, PANCRELIPASE AMYLASE SCH CAP: 10000; 32000; 42000 CAPSULE, DELAYED RELEASE ORAL at 17:03

## 2017-06-07 RX ADMIN — AMIODARONE HYDROCHLORIDE SCH MG: 200 TABLET ORAL at 09:01

## 2017-06-07 NOTE — OP
DATE OF SURGERY:  



PROCEDURE PERFORMED:  Skin biopsy.



DESCRIPTION OF PROCEDURE:  After informed signed consent, prepped right hip with

Betadine and alcohol.  Local, 1% lidocaine with epinephrine.  4 mm punch biopsy

done.  4-0 nylon sutures done.  Antibiotic ointment and Band-Aid.  Orders

written

 



______________________________

LAST GLEZ MD



DR:  ANGELINA/kendall  JOB#:  [id number] / [id number]

DD:  06/07/2017 18:01  DT:  06/07/2017 22:25

## 2017-06-07 NOTE — PDOC
PROGRESS NOTES


Subjective


Subjective


The patient is very weak. No falls today. No cardiac complaints.





Objective


Objective





Vital Signs








  Date Time  Temp Pulse Resp B/P (MAP) Pulse Ox O2 Delivery O2 Flow Rate FiO2


 


6/7/17 19:00 97.9 65 18 122/52 (75) 95 Room Air  





 97.9       


 


6/7/17 14:04       2.0 














Intake and Output 


 


 6/7/17





 07:00


 


Intake Total 3500 ml


 


Output Total 800 ml


 


Balance 2700 ml


 


 


 


Intake Oral 2500 ml


 


IV Total 1000 ml


 


Output Urine Total 800 ml


 


# Voids 4


 


# Bowel Movements 1











Physical Exam


Physical Exam


No changes in cardiac exam





Assessment


Assessment


The patient appears to be compensated cardiac-wise at this time.





I agree with present plan.





Comment


Review of Relevant


I have reviewed the following items christopher (where applicable) has been applied.


Labs





Laboratory Tests








Test


  6/6/17


07:05 6/7/17


11:00 6/7/17


15:00


 


Sodium Level


  145 mmol/L


(136-145) 145 mmol/L


(136-145) 


 


 


Potassium Level


  4.5 mmol/L


(3.5-5.1) 4.3 mmol/L


(3.5-5.1) 


 


 


Chloride Level


  115 mmol/L


() 113 mmol/L


() 


 


 


Carbon Dioxide Level


  22 mmol/L


(21-32) 25 mmol/L


(21-32) 


 


 


Anion Gap 8 (6-14)  7 (6-14)  


 


Blood Urea Nitrogen


  26 mg/dL


(7-20) 18 mg/dL


(7-20) 


 


 


Creatinine


  1.3 mg/dL


(0.6-1.0) 1.4 mg/dL


(0.6-1.0) 


 


 


Estimated GFR


(Cockcroft-Gault) 39.5 


  36.3 


  


 


 


Glucose Level


  105 mg/dL


(70-99) 107 mg/dL


(70-99) 


 


 


Calcium Level


  8.4 mg/dL


(8.5-10.1) 8.0 mg/dL


(8.5-10.1) 


 


 


Urine Collection Type   Unknown 


 


Urine Color   Yellow 


 


Urine Clarity   Clear 


 


Urine pH   8.0 


 


Urine Specific Gravity   1.020 


 


Urine Protein


  


  


  Negative mg/dL


(NEG-TRACE)


 


Urine Glucose (UA)


  


  


  Negative mg/dL


(NEG)


 


Urine Ketones (Stick)


  


  


  Negative mg/dL


(NEG)


 


Urine Blood   Negative (NEG) 


 


Urine Nitrite   Positive (NEG) 


 


Urine Bilirubin   Negative (NEG) 


 


Urine Urobilinogen Dipstick


  


  


  0.2 mg/dL (0.2


mg/dL)


 


Urine Leukocyte Esterase   Moderate (NEG) 


 


Urine RBC   0 /HPF (0-2) 


 


Urine WBC


  


  


  20-40 /HPF


(0-4)


 


Urine Squamous Epithelial


Cells 


  


  Occ /LPF 


 


 


Urine Bacteria


  


  


  Many /HPF


(0-FEW)








Laboratory Tests








Test


  6/7/17


11:00 6/7/17


15:00


 


Sodium Level


  145 mmol/L


(136-145) 


 


 


Potassium Level


  4.3 mmol/L


(3.5-5.1) 


 


 


Chloride Level


  113 mmol/L


() 


 


 


Carbon Dioxide Level


  25 mmol/L


(21-32) 


 


 


Anion Gap 7 (6-14)  


 


Blood Urea Nitrogen


  18 mg/dL


(7-20) 


 


 


Creatinine


  1.4 mg/dL


(0.6-1.0) 


 


 


Estimated GFR


(Cockcroft-Gault) 36.3 


  


 


 


Glucose Level


  107 mg/dL


(70-99) 


 


 


Calcium Level


  8.0 mg/dL


(8.5-10.1) 


 


 


Urine Collection Type  Unknown 


 


Urine Color  Yellow 


 


Urine Clarity  Clear 


 


Urine pH  8.0 


 


Urine Specific Gravity  1.020 


 


Urine Protein


  


  Negative mg/dL


(NEG-TRACE)


 


Urine Glucose (UA)


  


  Negative mg/dL


(NEG)


 


Urine Ketones (Stick)


  


  Negative mg/dL


(NEG)


 


Urine Blood  Negative (NEG) 


 


Urine Nitrite  Positive (NEG) 


 


Urine Bilirubin  Negative (NEG) 


 


Urine Urobilinogen Dipstick


  


  0.2 mg/dL (0.2


mg/dL)


 


Urine Leukocyte Esterase  Moderate (NEG) 


 


Urine RBC  0 /HPF (0-2) 


 


Urine WBC


  


  20-40 /HPF


(0-4)


 


Urine Squamous Epithelial


Cells 


  Occ /LPF 


 


 


Urine Bacteria


  


  Many /HPF


(0-FEW)








Microbiology


6/2/17 Urine Culture - Final, Complete


         


6/2/17 Urine Culture Result 1 (TAY) - Final, Complete


         


6/2/17 Urine Culture Result 2 (TAY) - Final, Complete


         


6/2/17 Antimicrobic Susceptibility - Final, Complete


Medications





Current Medications


Nitroglycerin (Nitrostat) 0.4 mg PRN Q5MIN  PRN SL CP RATING > 1/10;  Start 6/2/ 17 at 16:15;  Stop 6/2/17 at 20:42;  Status DC


Nitroglycerin/ Dextrose 250 ml @ 3 mls/hr 1X  ONCE IV ;  Start 6/2/17 at 16:15;

  Stop 6/6/17 at 03:34;  Status DC


Sodium Chloride 1,000 ml @  1,000 mls/hr Q1H IV  Last administered on 6/2/17at 

16:31;  Start 6/2/17 at 16:30;  Stop 6/2/17 at 17:29;  Status DC


Sodium Chloride (Normal Saline Flush) 10 ml QSHIFT  PRN IV AFTER MEDS AND BLOOD 

DRAWS;  Start 6/2/17 at 16:15


Ondansetron HCl (Zofran) 4 mg PRN Q8HRS  PRN IV NAUSEA/VOMITING;  Start 6/2/17 

at 16:45;  Stop 6/3/17 at 16:44;  Status DC


Fentanyl Citrate (Fentanyl 2ml Vial) 50 mcg PRN Q2HR  PRN IV PAIN;  Start 6/2/ 17 at 16:45;  Stop 6/3/17 at 16:44;  Status DC


Sodium Chloride 1,000 ml @  100 mls/hr Q10H IV  Last administered on 6/2/17at 18

:08;  Start 6/2/17 at 16:43;  Stop 6/3/17 at 16:42;  Status DC


Nitroglycerin (Nitrostat) 0.4 mg PRN Q5MIN  PRN SL CHEST PAIN;  Start 6/2/17 at 

16:45;  Stop 6/2/17 at 20:42;  Status DC


Atenolol (Tenormin) 25 mg BID PO  Last administered on 6/7/17at 09:02;  Start 6/ 2/17 at 21:00


Fentanyl (Duragesic 50mcg/ Hr Patch) 1 patch Q3DAYS TD ;  Start 6/3/17 at 09:00

;  Stop 6/3/17 at 09:00;  Status DC


Isosorbide Mononitrate (Imdur) 30 mg DAILY PO  Last administered on 6/7/17at 09:

01;  Start 6/3/17 at 09:00


Lisinopril (Prinivil) 10 mg DAILY PO  Last administered on 6/7/17at 09:02;  

Start 6/3/17 at 09:00


Nitroglycerin (Nitrostat) 0.4 mg PRN Q5MIN  PRN SL CHEST PAIN;  Start 6/2/17 at 

20:30


Ondansetron HCl (Zofran Odt) 4 mg PRN Q6HRS  PRN PO NAUSEA/VOMITING Last 

administered on 6/7/17at 20:44;  Start 6/2/17 at 20:30


Oxycodone/ Acetaminophen (Percocet 10/325) 1 tab PRN QID  PRN PO PAIN Last 

administered on 6/7/17at 14:04;  Start 6/2/17 at 20:30


Non-Formulary Medication 50 mg TID  PRN PO PAIN;  Start 6/2/17 at 20:30;  

Status UNV


Rivaroxaban (Xarelto) 15 mg DAILYWSUP PO ;  Start 6/3/17 at 17:00;  Status UNV


Ropinirole HCl (Requip) 0.25 mg QHS PO  Last administered on 6/6/17at 20:18;  

Start 6/2/17 at 21:00


Amiodarone HCl (Cordarone) 200 mg DAILY PO  Last administered on 6/7/17at 09:01

;  Start 6/3/17 at 09:00


Sodium Chloride 1,000 ml @  50 mls/hr Q20H IV  Last administered on 6/7/17at 17:

02;  Start 6/2/17 at 20:30


Fentanyl (Duragesic 50mcg/ Hr Patch) 1 patch Q3DAYS TD  Last administered on 6/5 /17at 09:36;  Start 6/5/17 at 09:00


Apixaban (Eliquis) 2.5 mg BID PO  Last administered on 6/7/17at 09:01;  Start 6/

3/17 at 11:00


Info (Anti-Coagulation Monitoring By Pharmacy) 1 each PRN DAILY  PRN [facility] SEE 

COMMENTS;  Start 6/3/17 at 10:45


Regadenoson (Lexiscan) 0.4 mg 1X  ONCE IV  Last administered on 6/5/17at 11:18;

  Start 6/5/17 at 09:30;  Stop 6/5/17 at 09:31;  Status DC


Amylase/Lipase/ Protease (Zenpep 10,000) 2 cap TIDWMEALS PO  Last administered 

on 6/7/17at 17:03;  Start 6/5/17 at 12:00


Diazepam (Valium) 10 mg QID PO  Last administered on 6/7/17at 17:02;  Start 6/5/ 17 at 17:00


Pantoprazole Sodium (Protonix) 40 mg DAILYAC PO  Last administered on 6/7/17at 

05:55;  Start 6/5/17 at 16:30


Iohexol (Omnipaque 300 Mg/ml) 50 ml STK-MED ONCE .ROUTE ;  Start 6/7/17 at 10:22

;  Stop 6/7/17 at 10:23;  Status DC


Gadobutrol (Gadavist) 7.5 mmol STK-MED ONCE .ROUTE ;  Start 6/7/17 at 10:30;  

Stop 6/7/17 at 10:31;  Status DC


Gadobutrol (Gadavist) 7 mmol 1X  ONCE IV  Last administered on 6/7/17at 10:48;  

Start 6/7/17 at 11:00;  Stop 6/7/17 at 11:01;  Status DC


Neomycin/ Polymyxin/ Bacitracin (Triple Antibiotic Ointment) 1 pkt DAILY TP ;  

Start 6/8/17 at 09:00





Active Scripts


Active


Reported


Amiodarone Hcl 200 Mg Tablet 1 Tab PO DAILY


Lasix (Furosemide) 20 Mg Tablet 0.5 Tab PO BID


Xarelto (Rivaroxaban) 20 Mg Tablet 20 Mg PO DAILY


Isosorbide Mononitrate Er (Isosorbide Mononitrate) 60 Mg Tab.er.24h 1 Tab PO 

DAILY


Requip (Ropinirole Hcl) 0.5 Mg Tablet 0.25 Tab PO HS


     NEXT DOSE: 11/28/15 EVENING


Demerol (Meperidine Hcl) 50 Mg Tablet 50 Mg PO TID PRN


Percocet  Mg Tablet (Oxycodone/Acetaminophen) 1 Each Tablet 1 Tab PO QID 

PRN


     NEXT DOSE: 11/28/15 AFTERNOON


FENTANYL 50mcg/hr (Fentanyl) 1 Each Patch.td72 1 Patch TP Q3DAYS


     NEXT DOSE: 11/30/15 CHANGE PATCH


Nitrostat (Nitroglycerin) 0.4 Mg Tab.subl 0.4 Mg SL PRN Q5MIN PRN


Zofran Odt (Ondansetron) 4 Mg Tab.rapdis 4 Mg PO Q6HRS PRN


Lisinopril 20 Mg Tablet 1 Tab PO DAILY


     NEXT DOSE: 11/29/15 AM


Potassium Chloride 10 Meq Tablet.er 1 Tab PO DAILY


     NEXT DOSE: 11/29/15 AM


Valium (Diazepam) 10 Mg Tablet 10 Mg PO QID


     NEXT DOSE: 11/28/15 2 PM


Tenormin (Atenolol) 50 Mg Tablet 50 Mg PO BID


     NEXT DOSE: 11/28/15 PM


Vitals/I & O





Vital Sign - Last 24 Hours








 6/6/17 6/7/17 6/7/17 6/7/17





 23:00 03:00 05:55 06:42


 


Temp 98.3 98.2  





 98.3 98.2  


 


Pulse 67 70  


 


Resp 20 20 18 


 


B/P (MAP) 135/59 (84) 143/61 (88)  


 


Pulse Ox 90 90 90 


 


O2 Delivery Room Air Room Air Room Air 


 


O2 Flow Rate   2.0 2.0


 


    





    





 6/7/17 6/7/17 6/7/17 6/7/17





 07:00 08:00 09:01 09:01


 


Temp 97.7   





 97.7   


 


Pulse 65  65 65


 


Resp 18   


 


B/P (MAP) 168/59 (95)  168/59 168/59


 


Pulse Ox 97   


 


O2 Delivery Room Air Room Air  


 


    





    





 6/7/17 6/7/17 6/7/17 6/7/17





 09:02 09:02 10:30 14:04


 


Temp   97.7 





   97.7 


 


Pulse 65 65 62 


 


Resp   18 20


 


B/P (MAP) 168/59 168/59 144/55 (84) 


 


Pulse Ox   92 92


 


O2 Delivery   Room Air Room Air


 


O2 Flow Rate    2.0


 


    





    





 6/7/17 6/7/17 6/7/17 





 14:30 15:04 19:00 


 


Temp 97.7  97.9 





 97.7  97.9 


 


Pulse 66  65 


 


Resp 18 20 18 


 


B/P (MAP) 122/60 (80)  122/52 (75) 


 


Pulse Ox 91 92 95 


 


O2 Delivery Room Air Room Air Room Air 














Intake and Output   


 


 6/6/17 6/6/17 6/7/17





 15:00 23:00 07:00


 


Intake Total 400 ml 1700 ml 1400 ml


 


Output Total 200 ml  600 ml


 


Balance 200 ml 1700 ml 800 ml

















CALVIN NAVARRO MD Jun 7, 2017 21:20

## 2017-06-07 NOTE — RAD
Fluoroscopy guided left subclavian tunneled power port check



Indication: 77-year-old female with indwelling left subclavian PowerPort.  By

history, there is difficulty aspirating blood from the port.  Port check has

been requested.



Fluoroscopy time: 0.3 minutes



Kerma-area Product:    5 Gycm2



Contrast material: 7 cc gadolinium



Anesthesia: None





Procedure: Informed consent was obtained from the patient. She was placed

supine on the angiography table.  Preliminary physical examination revealed a

left chest power port, which had been previously accessed with a Manley needle.

There was no evidence of pocket or tunnel infection. Preliminary fluoroscopic

evaluation revealed intact power port, with its tip projected over expected

location of upper superior vena cava.  Using aseptic technique,  the port was

documented to aspirate normally, at least with the patient in the supine

position.  Using aseptic technique, a small amount of gadolinium was then

slowly injected through the Manley needle.  This resulted in contrast

opacification of the power port body and catheter. There was no evidence of

catheter fracture or perforation. A more forceful injection of gadolinium was

then performed, and DSA images were obtained over chest. Those images

confirmed a widely patent power port system, without obstructing pericatheter

thrombus or fibrin sheath.  Patient tolerated the procedure well without

apparent complication.



Impression:  Essentially unremarkable fluoroscopy guided power port check,

with gadolinium injection.  The power port is intact, lies in satisfactory

position, is widely patent, and flushes and aspirates normally.

## 2017-06-07 NOTE — PDOC
GENERAL


General:


vss and afebrile. awake and alert and daughter in attendance. not able to get 

morning lab draws from port and will have radiology evaluate. fall in room 

twice yesterday and therapy to eval for same. dysuria this am and will check ua/

c&s.  still awaiting dermatology eval for right hip process though definitely 

improved since admission. exam stable. hopefully dc tomorrow.


Problems:  





VITAL SIGNS


Vital Signs:





Vital Signs








  Date Time  Temp Pulse Resp B/P (MAP) Pulse Ox O2 Delivery O2 Flow Rate FiO2


 


6/7/17 06:42     90 Room Air 2.0 


 


6/7/17 05:55   18     


 


6/7/17 03:00 98.2 70  143/61 (88)    





 98.2       











I & O


I & O











Intake and Output 


 


 6/7/17





 07:00


 


Intake Total 3500 ml


 


Output Total 800 ml


 


Balance 2700 ml


 


 


 


Intake Oral 2500 ml


 


IV Total 1000 ml


 


Output Urine Total 800 ml


 


# Voids 4


 


# Bowel Movements 1











ALLERGIES


Allergies:





Allergies








Coded Allergies Type Severity Reaction Last Updated Verified


 


  diphenhydramine Allergy Severe Shortness of Air 6/1/16 Yes


 


  NSAIDS (Non-Steroidal Anti-Inflamma Allergy Intermediate All except Feldene 1/

5/15 Yes


 


  Pentazocine Lactate Allergy Intermediate Blood pressure increase, chest pain, 

heart races, itching. 1/5/15 Yes


 


  Trimethobenzamide HCl Allergy Intermediate Severe muscle spasms 1/5/15 Yes


 


  acetaminophen Allergy Intermediate Shallow breathing, slow heart rate, 

stomach pain 1/5/15 Yes


 


  adhesive Allergy Intermediate rash--"will explain." 1/5/15 Yes


 


  butorphanol tartrate Allergy Intermediate Hallucinations, HA, anxiety, rash 1/

5/15 Yes


 


  carisoprodol Allergy Intermediate Fainting, fast heart rate, light-headed 1/5/

15 Yes


 


  chlorzoxazone Allergy Intermediate Nausea, dizzy, rash, feels like I might 

pass out 1/5/15 Yes


 


  codeine Allergy Intermediate Shallow breathing, vomiting, severe HA, 

bradycardia, itching 1/5/15 Yes


 


  codeine phosphate Allergy Intermediate Shallow breathing, slow heart rate, 

stomach pain 1/5/15 Yes


 


  cyclobenzaprine HCl Allergy Intermediate Chest pain,HA, fast heart rate, 

nervous 1/5/15 Yes


 


  erythromycin base Allergy Intermediate Fainting, fast/irreg HR, rash, 

itiching 1/5/15 Yes


 


  hydromorphone HCl Allergy Intermediate Apneic 1/5/15 Yes


 


  iodine Allergy Intermediate Itching, skin rash, hives 1/5/15 Yes


 


  ketorolac tromethamine Allergy Intermediate Chest pain, sudden severe HA 1/5/

15 Yes


 


  levofloxacin Allergy Intermediate Severe dizziness, heart races, itching, 

stomach pain 1/5/15 Yes


 


  midazolam HCl Allergy Intermediate Slow heart rate, affects bld pressure, 

mild rash 1/5/15 Yes


 


  morphine Allergy Intermediate apneic 1/5/15 Yes


 


  nalbuphine HCl Allergy Intermediate Tachycardia, confusion, hallucinations, 

nervous 1/5/15 Yes


 


  I S O L A T I O N *CONTACT* Allergy Unknown  12/8/16 Yes











[facility]


Medications:





Current Medications








 Medications


  (Trade)  Dose


 Ordered  Sig/Laura  Start Time


 Stop Time Status Last Admin


Dose Admin


 


 Amiodarone HCl


  (Cordarone)  200 mg  DAILY  6/3/17 09:00


    6/6/17 09:16


200 MG


 


 Amylase/Lipase/


 Protease


  (Zenpep 10,000)  2 cap  TIDWMEALS  6/5/17 12:00


    6/6/17 17:09


2 CAP


 


 Apixaban


  (Eliquis)  2.5 mg  BID  6/3/17 11:00


    6/6/17 20:18


2.5 MG


 


 Atenolol


  (Tenormin)  25 mg  BID  6/2/17 21:00


    6/6/17 20:18


25 MG


 


 Diazepam


  (Valium)  10 mg  QID  6/5/17 17:00


    6/6/17 20:19


10 MG


 


 Fentanyl


  (Duragesic 50mcg/


 Hr Patch)  1 patch  Q3DAYS  6/5/17 09:00


    6/5/17 09:36


1 PATCH


 


 Fentanyl Citrate


  (Fentanyl 2ml


 Vial)  50 mcg  PRN Q2HR  PRN  6/2/17 16:45


 6/3/17 16:44 DC  


 


 


 Info


  (Anti-Coagulation


 Monitoring By


 Pharmacy)  1 each  PRN DAILY  PRN  6/3/17 10:45


     


 


 


 Isosorbide


 Mononitrate


  (Imdur)  30 mg  DAILY  6/3/17 09:00


    6/6/17 09:17


30 MG


 


 Lisinopril


  (Prinivil)  10 mg  DAILY  6/3/17 09:00


    6/6/17 09:17


10 MG


 


 Nitroglycerin


  (Nitrostat)  0.4 mg  PRN Q5MIN  PRN  6/2/17 20:30


     


 


 


 Nitroglycerin/


 Dextrose  250 ml @ 3


 mls/hr  1X  ONCE  6/2/17 16:15


 6/6/17 03:34 DC  


 


 


 Non-Formulary


 Medication  50 mg  TID  PRN  6/2/17 20:30


   UNV  


 


 


 Ondansetron HCl


  (Zofran Odt)  4 mg  PRN Q6HRS  PRN  6/2/17 20:30


    6/7/17 05:55


4 MG


 


 Ondansetron HCl


  (Zofran)  4 mg  PRN Q8HRS  PRN  6/2/17 16:45


 6/3/17 16:44 DC  


 


 


 Oxycodone/


 Acetaminophen


  (Percocet 10/325)  1 tab  PRN QID  PRN  6/2/17 20:30


    6/7/17 05:55


1 TAB


 


 Pantoprazole


 Sodium


  (Protonix)  40 mg  DAILYAC  6/5/17 16:30


    6/7/17 05:55


40 MG


 


 Regadenoson


  (Lexiscan)  0.4 mg  1X  ONCE  6/5/17 09:30


 6/5/17 09:31 DC 6/5/17 11:18


0.4 MG


 


 Rivaroxaban


  (Xarelto)  15 mg  DAILYWSUP  6/3/17 17:00


   UNV  


 


 


 Ropinirole HCl


  (Requip)  0.25 mg  QHS  6/2/17 21:00


    6/6/17 20:18


0.25 MG


 


 Sodium Chloride  1,000 ml @ 


 50 mls/hr  Q20H  6/2/17 20:30


    6/6/17 20:19


50 MLS/HR


 


 Sodium Chloride


  (Normal Saline


 Flush)  10 ml  QSHIFT  PRN  6/2/17 16:15


     


 

















KRISTIN LINDSAY MD Jun 7, 2017 08:20

## 2017-06-07 NOTE — PDOC
Provider Note


RENAL F/U : MICHAEL





S : Some dysuria last night.


      Feels better.


     No abd pain.


            


O : 


VSS


Afebrile.


Neck : Supple


Lungs : Non labored.


CVS : RRR


Abd : Benign in appearance, without distention.


Ext :  No edema


Neuro : Awake.





Labs reviewed.








A/P :


ARF/ATN


HTN w CKD


CKD II





Doing Ok


Cr stable.


UOP fairly good.








Supportive care.


CPM.











ADELINE RODRIGUEZ MD Jun 7, 2017 18:22

## 2017-06-07 NOTE — PDOC
Subjective:


Subjective:


Headache, blurry vision.  Just started, morning meds given ~20 min ago.


Daughter feels symptoms related to pantoprazole.





Objective:


Vital Signs:





 Vital Signs








  Date Time  Temp Pulse Resp B/P (MAP) Pulse Ox O2 Delivery O2 Flow Rate FiO2


 


6/7/17 09:02  65  168/59    


 


6/7/17 07:00 97.7  18  97 Room Air  





 97.7       


 


6/7/17 06:42       2.0 











PE:





GEN: NAD


LUNGS: CTAB


HEART: RRR


ABD: S/ND/NT


NEURO/PSYCH: A & O 3, not as talkative today





A/P:


GERD/dyspepsia/abd pain - none today


-takes viscous lidocaine and Tums PRN QHS at home, started PPI here 6/5





Headache, blurry vision, falls





--


D/w RN - pantoprazole given during night shift (before 7:00 a.m.)  ?symptoms 2/

2 Valium


Other per Dr. Egan.











JAZMIN ASHFORD Jun 7, 2017 10:03

## 2017-06-07 NOTE — PDOC
Exam








Raymon





Assistant


Assistant


B Carlitos





Pre-Procedure Diagnosis


Pre-Procedure Diagnosis


Unable to aspirate thru left subclavian tunneled Power Port





Post-Procedure Diagnosis


Post-Procedure Diagnosis


Normal Power Port check---aspirates and flushes normally





Procedure Performed


Procedure Performed


Fluoro guided Power Port check/injection (with gadolinium due to Iodine 

sensitivity)





Type of Anesthesia


Type of Anesthesia


None





Condition of Patient


Condition of Patient


Stable.  No apparent complication.





Disposition


Disposition


From IR return to [facility].  OK to use Power Port for blood draws and infusions.  

Full report to follow.











ETHAN BENNETT MD Jun 7, 2017 10:59

## 2017-06-08 VITALS — SYSTOLIC BLOOD PRESSURE: 148 MMHG | DIASTOLIC BLOOD PRESSURE: 58 MMHG

## 2017-06-08 VITALS — DIASTOLIC BLOOD PRESSURE: 61 MMHG | SYSTOLIC BLOOD PRESSURE: 152 MMHG

## 2017-06-08 VITALS — SYSTOLIC BLOOD PRESSURE: 146 MMHG | DIASTOLIC BLOOD PRESSURE: 59 MMHG

## 2017-06-08 VITALS — SYSTOLIC BLOOD PRESSURE: 138 MMHG | DIASTOLIC BLOOD PRESSURE: 55 MMHG

## 2017-06-08 VITALS — SYSTOLIC BLOOD PRESSURE: 133 MMHG | DIASTOLIC BLOOD PRESSURE: 62 MMHG

## 2017-06-08 VITALS — SYSTOLIC BLOOD PRESSURE: 119 MMHG | DIASTOLIC BLOOD PRESSURE: 51 MMHG

## 2017-06-08 LAB
ANION GAP SERPL CALC-SCNC: 7 MMOL/L (ref 6–14)
BUN SERPL-MCNC: 16 MG/DL (ref 7–20)
CALCIUM SERPL-MCNC: 8 MG/DL (ref 8.5–10.1)
CHLORIDE SERPL-SCNC: 112 MMOL/L (ref 98–107)
CO2 SERPL-SCNC: 25 MMOL/L (ref 21–32)
CREAT SERPL-MCNC: 1.2 MG/DL (ref 0.6–1)
GFR SERPLBLD BASED ON 1.73 SQ M-ARVRAT: 43.3 ML/MIN
GLUCOSE SERPL-MCNC: 94 MG/DL (ref 70–99)
POTASSIUM SERPL-SCNC: 4.4 MMOL/L (ref 3.5–5.1)
SODIUM SERPL-SCNC: 144 MMOL/L (ref 136–145)

## 2017-06-08 RX ADMIN — PANTOPRAZOLE SODIUM SCH MG: 40 TABLET, DELAYED RELEASE ORAL at 06:58

## 2017-06-08 RX ADMIN — PANCRELIPASE LIPASE, PANCRELIPASE PROTEASE, PANCRELIPASE AMYLASE SCH CAP: 10000; 32000; 42000 CAPSULE, DELAYED RELEASE ORAL at 17:07

## 2017-06-08 RX ADMIN — ISOSORBIDE MONONITRATE SCH MG: 30 TABLET, EXTENDED RELEASE ORAL at 08:14

## 2017-06-08 RX ADMIN — ONDANSETRON PRN MG: 4 TABLET, ORALLY DISINTEGRATING ORAL at 06:58

## 2017-06-08 RX ADMIN — PANCRELIPASE LIPASE, PANCRELIPASE PROTEASE, PANCRELIPASE AMYLASE SCH CAP: 10000; 32000; 42000 CAPSULE, DELAYED RELEASE ORAL at 08:08

## 2017-06-08 RX ADMIN — SODIUM CHLORIDE SCH MLS/HR: 450 INJECTION, SOLUTION INTRAVENOUS at 13:29

## 2017-06-08 RX ADMIN — PANCRELIPASE LIPASE, PANCRELIPASE PROTEASE, PANCRELIPASE AMYLASE SCH CAP: 10000; 32000; 42000 CAPSULE, DELAYED RELEASE ORAL at 11:05

## 2017-06-08 RX ADMIN — OXYCODONE HYDROCHLORIDE AND ACETAMINOPHEN PRN TAB: 10; 325 TABLET ORAL at 03:48

## 2017-06-08 RX ADMIN — LISINOPRIL SCH MG: 10 TABLET ORAL at 08:13

## 2017-06-08 RX ADMIN — APIXABAN SCH MG: 2.5 TABLET, FILM COATED ORAL at 08:08

## 2017-06-08 RX ADMIN — ROPINIROLE HYDROCHLORIDE SCH MG: 0.25 TABLET, FILM COATED ORAL at 21:34

## 2017-06-08 RX ADMIN — ATENOLOL SCH MG: 25 TABLET ORAL at 08:14

## 2017-06-08 RX ADMIN — BACITRACIN ZINC, NEOMYCIN, POLYMYXIN B SCH PKT: 400; 3.5; 5 OINTMENT TOPICAL at 09:40

## 2017-06-08 RX ADMIN — Medication PRN EACH: at 12:01

## 2017-06-08 RX ADMIN — FENTANYL SCH PATCH: 50 PATCH, EXTENDED RELEASE TRANSDERMAL at 08:10

## 2017-06-08 RX ADMIN — APIXABAN SCH MG: 2.5 TABLET, FILM COATED ORAL at 21:34

## 2017-06-08 RX ADMIN — ATENOLOL SCH MG: 25 TABLET ORAL at 21:34

## 2017-06-08 RX ADMIN — AMIODARONE HYDROCHLORIDE SCH MG: 200 TABLET ORAL at 08:13

## 2017-06-08 NOTE — PDOC
Subjective:


Subjective:


Terrible night w/ abd pain - epigastrium spreading down.


Says she didn't tell the nurse she was constipated but is feeling that way.  

Takes Dulcolax at home.


Had heartburn last night.





Objective:


Objective:


D/w RN - nausea, eating about 40% of meals, c/o constipation.


Vital Signs:





 Vital Signs








  Date Time  Temp Pulse Resp B/P (MAP) Pulse Ox O2 Delivery O2 Flow Rate FiO2


 


6/8/17 12:10     91 Nasal Cannula 2.0 


 


6/8/17 11:00 98.1 60 18 133/62 (85)    





 98.1       








Labs:





Laboratory Tests








Test


  6/7/17


15:00 6/8/17


05:00


 


Urine Collection Type Unknown  


 


Urine Color Yellow  


 


Urine Clarity Clear  


 


Urine pH 8.0  


 


Urine Specific Gravity 1.020  


 


Urine Protein Negative mg/dL  


 


Urine Glucose (UA) Negative mg/dL  


 


Urine Ketones (Stick) Negative mg/dL  


 


Urine Blood Negative  


 


Urine Nitrite Positive  


 


Urine Bilirubin Negative  


 


Urine Urobilinogen Dipstick 0.2 mg/dL  


 


Urine Leukocyte Esterase Moderate  


 


Urine RBC 0 /HPF  


 


Urine WBC 20-40 /HPF  


 


Urine Squamous Epithelial


Cells Occ /LPF 


  


 


 


Urine Bacteria Many /HPF  


 


Sodium Level  144 mmol/L 


 


Potassium Level  4.4 mmol/L 


 


Chloride Level  112 mmol/L 


 


Carbon Dioxide Level  25 mmol/L 


 


Anion Gap  7 


 


Blood Urea Nitrogen  16 mg/dL 


 


Creatinine  1.2 mg/dL 


 


Estimated GFR


(Cockcroft-Gault) 


  43.3 


 


 


Glucose Level  94 mg/dL 


 


Calcium Level  8.0 mg/dL 











PE:





GEN: NAD, sitting up in bed w/ lunch tray


LUNGS: CTAB


HEART: RRR


ABD: epigastric discomfort


NEURO/PSYCH: A & O 3





A/P:


GERD/dyspepsia


-was taking viscous lidocaine and Tums at home, started PPI here on 6/5





Epigastric pain, decreased appetite





Constipation


-seems alternating bowel habits, note h/o ?pancreatic insufficiency and and 

collagenous colitis - diarrhea improved w/ pancreatic enzymes





--


Will add Dulcolax and Miralax PRN.











JAZMIN ASHFORD Jun 8, 2017 13:32

## 2017-06-08 NOTE — PDOC
GENERAL


General:


vss and afebrile. vomited multiple times during night and has puke pan in hand 

during exam. awake and alert. exam stable. creatinine down to 1.2. biopsy of 

right hip lesion done by dermatology yesterday and will await path. otherwise 

same. port working fine by IR.


Problems:  





VITAL SIGNS


Vital Signs:





Vital Signs








  Date Time  Temp Pulse Resp B/P (MAP) Pulse Ox O2 Delivery O2 Flow Rate FiO2


 


6/8/17 08:14  63  152/61    


 


6/8/17 08:10     93 Nasal Cannula 2.0 


 


6/8/17 07:00 97.6  18     





 97.6       











I & O


I & O











Intake and Output 


 


 6/8/17





 07:00


 


Intake Total 740 ml


 


Output Total 825 ml


 


Balance -85 ml


 


 


 


Intake Oral 740 ml


 


Output Urine Total 825 ml











ALLERGIES


Allergies:





Allergies








Coded Allergies Type Severity Reaction Last Updated Verified


 


  diphenhydramine Allergy Severe Shortness of Air 6/1/16 Yes


 


  NSAIDS (Non-Steroidal Anti-Inflamma Allergy Intermediate All except Feldene 1/

5/15 Yes


 


  Pentazocine Lactate Allergy Intermediate Blood pressure increase, chest pain, 

heart races, itching. 1/5/15 Yes


 


  Trimethobenzamide HCl Allergy Intermediate Severe muscle spasms 1/5/15 Yes


 


  acetaminophen Allergy Intermediate Shallow breathing, slow heart rate, 

stomach pain 1/5/15 Yes


 


  adhesive Allergy Intermediate rash--"will explain." 1/5/15 Yes


 


  butorphanol tartrate Allergy Intermediate Hallucinations, HA, anxiety, rash 1/

5/15 Yes


 


  carisoprodol Allergy Intermediate Fainting, fast heart rate, light-headed 1/5/

15 Yes


 


  chlorzoxazone Allergy Intermediate Nausea, dizzy, rash, feels like I might 

pass out 1/5/15 Yes


 


  codeine Allergy Intermediate Shallow breathing, vomiting, severe HA, 

bradycardia, itching 1/5/15 Yes


 


  codeine phosphate Allergy Intermediate Shallow breathing, slow heart rate, 

stomach pain 1/5/15 Yes


 


  cyclobenzaprine HCl Allergy Intermediate Chest pain,HA, fast heart rate, 

nervous 1/5/15 Yes


 


  erythromycin base Allergy Intermediate Fainting, fast/irreg HR, rash, 

itiching 1/5/15 Yes


 


  hydromorphone HCl Allergy Intermediate Apneic 1/5/15 Yes


 


  iodine Allergy Intermediate Itching, skin rash, hives 1/5/15 Yes


 


  ketorolac tromethamine Allergy Intermediate Chest pain, sudden severe HA 1/5/

15 Yes


 


  levofloxacin Allergy Intermediate Severe dizziness, heart races, itching, 

stomach pain 1/5/15 Yes


 


  midazolam HCl Allergy Intermediate Slow heart rate, affects bld pressure, 

mild rash 1/5/15 Yes


 


  morphine Allergy Intermediate apneic 1/5/15 Yes


 


  nalbuphine HCl Allergy Intermediate Tachycardia, confusion, hallucinations, 

nervous 1/5/15 Yes


 


  I S O L A T I O N *CONTACT* Allergy Unknown  12/8/16 Yes











MEDS


Medications:





Current Medications








 Medications


  (Trade)  Dose


 Ordered  Sig/Laura  Start Time


 Stop Time Status Last Admin


Dose Admin


 


 Amiodarone HCl


  (Cordarone)  200 mg  DAILY  6/3/17 09:00


    6/8/17 08:13


200 MG


 


 Amylase/Lipase/


 Protease


  (Zenpep 10,000)  2 cap  TIDWMEALS  6/5/17 12:00


    6/8/17 08:08


2 CAP


 


 Apixaban


  (Eliquis)  2.5 mg  BID  6/3/17 11:00


    6/8/17 08:08


2.5 MG


 


 Atenolol


  (Tenormin)  25 mg  BID  6/2/17 21:00


    6/8/17 08:14


25 MG


 


 Diazepam


  (Valium)  10 mg  QID  6/5/17 17:00


    6/8/17 08:12


10 MG


 


 Fentanyl


  (Duragesic 50mcg/


 Hr Patch)  1 patch  Q3DAYS  6/5/17 09:00


    6/8/17 08:10


1 PATCH


 


 Fentanyl Citrate


  (Fentanyl 2ml


 Vial)  50 mcg  PRN Q2HR  PRN  6/2/17 16:45


 6/3/17 16:44 DC  


 


 


 Gadobutrol


  (Gadavist)  7 mmol  1X  ONCE  6/7/17 11:00


 6/7/17 11:01 DC 6/7/17 10:48


7 MMOL


 


 Info


  (Anti-Coagulation


 Monitoring By


 Pharmacy)  1 each  PRN DAILY  PRN  6/3/17 10:45


     


 


 


 Iohexol


  (Omnipaque 300


 Mg/ml)  50 ml  STK-MED ONCE  6/7/17 10:22


 6/7/17 10:23 DC  


 


 


 Isosorbide


 Mononitrate


  (Imdur)  30 mg  DAILY  6/3/17 09:00


    6/8/17 08:14


30 MG


 


 Lisinopril


  (Prinivil)  10 mg  DAILY  6/3/17 09:00


    6/8/17 08:13


10 MG


 


 Neomycin/


 Polymyxin/


 Bacitracin


  (Triple


 Antibiotic


 Ointment)  1 pkt  DAILY  6/8/17 09:00


     


 


 


 Nitroglycerin


  (Nitrostat)  0.4 mg  PRN Q5MIN  PRN  6/2/17 20:30


     


 


 


 Nitroglycerin/


 Dextrose  250 ml @ 3


 mls/hr  1X  ONCE  6/2/17 16:15


 6/6/17 03:34 DC  


 


 


 Non-Formulary


 Medication  50 mg  TID  PRN  6/2/17 20:30


   UNV  


 


 


 Ondansetron HCl


  (Zofran Odt)  4 mg  PRN Q6HRS  PRN  6/2/17 20:30


    6/8/17 06:58


4 MG


 


 Ondansetron HCl


  (Zofran)  4 mg  PRN Q8HRS  PRN  6/2/17 16:45


 6/3/17 16:44 DC  


 


 


 Oxycodone/


 Acetaminophen


  (Percocet 10/325)  1 tab  PRN QID  PRN  6/2/17 20:30


    6/8/17 03:48


1 TAB


 


 Pantoprazole


 Sodium


  (Protonix)  40 mg  DAILYAC  6/5/17 16:30


    6/8/17 06:58


40 MG


 


 Regadenoson


  (Lexiscan)  0.4 mg  1X  ONCE  6/5/17 09:30


 6/5/17 09:31 DC 6/5/17 11:18


0.4 MG


 


 Rivaroxaban


  (Xarelto)  15 mg  DAILYWSUP  6/3/17 17:00


   UNV  


 


 


 Ropinirole HCl


  (Requip)  0.25 mg  QHS  6/2/17 21:00


    6/7/17 22:08


0.25 MG


 


 Sodium Chloride  1,000 ml @ 


 50 mls/hr  Q20H  6/2/17 20:30


    6/7/17 17:02


50 MLS/HR


 


 Sodium Chloride


  (Normal Saline


 Flush)  10 ml  QSHIFT  PRN  6/2/17 16:15


     


 











LAB


Lab:





Laboratory Tests








Test


  6/7/17


11:00 6/7/17


15:00 6/8/17


05:00


 


Sodium Level


  145 mmol/L


(136-145) 


  144 mmol/L


(136-145)


 


Potassium Level


  4.3 mmol/L


(3.5-5.1) 


  4.4 mmol/L


(3.5-5.1)


 


Chloride Level


  113 mmol/L


() 


  112 mmol/L


()


 


Carbon Dioxide Level


  25 mmol/L


(21-32) 


  25 mmol/L


(21-32)


 


Anion Gap 7 (6-14)   7 (6-14) 


 


Blood Urea Nitrogen


  18 mg/dL


(7-20) 


  16 mg/dL


(7-20)


 


Creatinine


  1.4 mg/dL


(0.6-1.0) 


  1.2 mg/dL


(0.6-1.0)


 


Estimated GFR


(Cockcroft-Gault) 36.3 


  


  43.3 


 


 


Glucose Level


  107 mg/dL


(70-99) 


  94 mg/dL


(70-99)


 


Calcium Level


  8.0 mg/dL


(8.5-10.1) 


  8.0 mg/dL


(8.5-10.1)


 


Urine Collection Type  Unknown  


 


Urine Color  Yellow  


 


Urine Clarity  Clear  


 


Urine pH  8.0  


 


Urine Specific Gravity  1.020  


 


Urine Protein


  


  Negative mg/dL


(NEG-TRACE) 


 


 


Urine Glucose (UA)


  


  Negative mg/dL


(NEG) 


 


 


Urine Ketones (Stick)


  


  Negative mg/dL


(NEG) 


 


 


Urine Blood  Negative (NEG)  


 


Urine Nitrite  Positive (NEG)  


 


Urine Bilirubin  Negative (NEG)  


 


Urine Urobilinogen Dipstick


  


  0.2 mg/dL (0.2


mg/dL) 


 


 


Urine Leukocyte Esterase  Moderate (NEG)  


 


Urine RBC  0 /HPF (0-2)  


 


Urine WBC


  


  20-40 /HPF


(0-4) 


 


 


Urine Squamous Epithelial


Cells 


  Occ /LPF 


  


 


 


Urine Bacteria


  


  Many /HPF


(0-FEW) 


 

















KRISTIN LINDSAY MD Jun 8, 2017 08:38

## 2017-06-08 NOTE — PDOC
PROGRESS NOTES


Subjective


Subjective


Patient is very depressed and crying easily when she discussed her home 

situation with her  working 2 jobs at the age of 80 and her daughter 

being sick and terminal.





Overwhelmed over not being able to clean her house and does not have anyone to 

help her.





Very apologetic for every remark.





She is very weak and cannot get up and around very well.





Objective


Objective





Vital Signs








  Date Time  Temp Pulse Resp B/P (MAP) Pulse Ox O2 Delivery O2 Flow Rate FiO2


 


6/8/17 12:10     91 Nasal Cannula 2.0 


 


6/8/17 11:00 98.1 60 18 133/62 (85)    





 98.1       














Intake and Output 


 


 6/8/17





 07:00


 


Intake Total 740 ml


 


Output Total 825 ml


 


Balance -85 ml


 


 


 


Intake Oral 740 ml


 


Output Urine Total 825 ml











Physical Exam


Physical Exam


No significant changes in cardiac exam





Assessment


Assessment


The patient appears to be very depressed and overwhelmed with worries.





In her present condition I don't think that she can take care of herself at 

home and with her  working 2 jobs and her daughter not in very good 

health I think that she may need significant assistance at home.





She is not very happy with the current home health service that she is getting 

at this time. I will leave that up to Dr. Philip as to what changes to do with 

the home health service as well as having frequent the come to visit her but I 

think she needs a lot of assistance and may need to also consider Meals on 

Wheels or something along those lines to help her when she goes home. If the 

situation is as difficult as she sees it may need to consider hospice care at 

home. I will leave that up to Dr. Philip.





Perhaps the patient may benefit from counseling from [facility].





Comment


Review of Relevant


I have reviewed the following items christopher (where applicable) has been applied.


Labs





Laboratory Tests








Test


  6/7/17


11:00 6/7/17


15:00 6/8/17


05:00


 


Sodium Level


  145 mmol/L


(136-145) 


  144 mmol/L


(136-145)


 


Potassium Level


  4.3 mmol/L


(3.5-5.1) 


  4.4 mmol/L


(3.5-5.1)


 


Chloride Level


  113 mmol/L


() 


  112 mmol/L


()


 


Carbon Dioxide Level


  25 mmol/L


(21-32) 


  25 mmol/L


(21-32)


 


Anion Gap 7 (6-14)   7 (6-14) 


 


Blood Urea Nitrogen


  18 mg/dL


(7-20) 


  16 mg/dL


(7-20)


 


Creatinine


  1.4 mg/dL


(0.6-1.0) 


  1.2 mg/dL


(0.6-1.0)


 


Estimated GFR


(Cockcroft-Gault) 36.3 


  


  43.3 


 


 


Glucose Level


  107 mg/dL


(70-99) 


  94 mg/dL


(70-99)


 


Calcium Level


  8.0 mg/dL


(8.5-10.1) 


  8.0 mg/dL


(8.5-10.1)


 


Urine Collection Type  Unknown  


 


Urine Color  Yellow  


 


Urine Clarity  Clear  


 


Urine pH  8.0  


 


Urine Specific Gravity  1.020  


 


Urine Protein


  


  Negative mg/dL


(NEG-TRACE) 


 


 


Urine Glucose (UA)


  


  Negative mg/dL


(NEG) 


 


 


Urine Ketones (Stick)


  


  Negative mg/dL


(NEG) 


 


 


Urine Blood  Negative (NEG)  


 


Urine Nitrite  Positive (NEG)  


 


Urine Bilirubin  Negative (NEG)  


 


Urine Urobilinogen Dipstick


  


  0.2 mg/dL (0.2


mg/dL) 


 


 


Urine Leukocyte Esterase  Moderate (NEG)  


 


Urine RBC  0 /HPF (0-2)  


 


Urine WBC


  


  20-40 /HPF


(0-4) 


 


 


Urine Squamous Epithelial


Cells 


  Occ /LPF 


  


 


 


Urine Bacteria


  


  Many /HPF


(0-FEW) 


 








Laboratory Tests








Test


  6/8/17


05:00


 


Sodium Level


  144 mmol/L


(136-145)


 


Potassium Level


  4.4 mmol/L


(3.5-5.1)


 


Chloride Level


  112 mmol/L


()


 


Carbon Dioxide Level


  25 mmol/L


(21-32)


 


Anion Gap 7 (6-14) 


 


Blood Urea Nitrogen


  16 mg/dL


(7-20)


 


Creatinine


  1.2 mg/dL


(0.6-1.0)


 


Estimated GFR


(Cockcroft-Gault) 43.3 


 


 


Glucose Level


  94 mg/dL


(70-99)


 


Calcium Level


  8.0 mg/dL


(8.5-10.1)








Microbiology


6/7/17 Urine Culture - Preliminary, Resulted


         


6/7/17 Urine Culture Result 1 (TAY) - Preliminary, Resulted


Medications





Current Medications


Nitroglycerin (Nitrostat) 0.4 mg PRN Q5MIN  PRN SL CP RATING > 1/10;  Start 6/2/ 17 at 16:15;  Stop 6/2/17 at 20:42;  Status DC


Nitroglycerin/ Dextrose 250 ml @ 3 mls/hr 1X  ONCE IV ;  Start 6/2/17 at 16:15;

  Stop 6/6/17 at 03:34;  Status DC


Sodium Chloride 1,000 ml @  1,000 mls/hr Q1H IV  Last administered on 6/2/17at 

16:31;  Start 6/2/17 at 16:30;  Stop 6/2/17 at 17:29;  Status DC


Sodium Chloride (Normal Saline Flush) 10 ml QSHIFT  PRN IV AFTER MEDS AND BLOOD 

DRAWS;  Start 6/2/17 at 16:15


Ondansetron HCl (Zofran) 4 mg PRN Q8HRS  PRN IV NAUSEA/VOMITING;  Start 6/2/17 

at 16:45;  Stop 6/3/17 at 16:44;  Status DC


Fentanyl Citrate (Fentanyl 2ml Vial) 50 mcg PRN Q2HR  PRN IV PAIN;  Start 6/2/ 17 at 16:45;  Stop 6/3/17 at 16:44;  Status DC


Sodium Chloride 1,000 ml @  100 mls/hr Q10H IV  Last administered on 6/2/17at 18

:08;  Start 6/2/17 at 16:43;  Stop 6/3/17 at 16:42;  Status DC


Nitroglycerin (Nitrostat) 0.4 mg PRN Q5MIN  PRN SL CHEST PAIN;  Start 6/2/17 at 

16:45;  Stop 6/2/17 at 20:42;  Status DC


Atenolol (Tenormin) 25 mg BID PO  Last administered on 6/8/17at 08:14;  Start 6/ 2/17 at 21:00


Fentanyl (Duragesic 50mcg/ Hr Patch) 1 patch Q3DAYS TD ;  Start 6/3/17 at 09:00

;  Stop 6/3/17 at 09:00;  Status DC


Isosorbide Mononitrate (Imdur) 30 mg DAILY PO  Last administered on 6/8/17at 08:

14;  Start 6/3/17 at 09:00


Lisinopril (Prinivil) 10 mg DAILY PO  Last administered on 6/8/17at 08:13;  

Start 6/3/17 at 09:00


Nitroglycerin (Nitrostat) 0.4 mg PRN Q5MIN  PRN SL CHEST PAIN;  Start 6/2/17 at 

20:30


Ondansetron HCl (Zofran Odt) 4 mg PRN Q6HRS  PRN PO NAUSEA/VOMITING Last 

administered on 6/8/17at 06:58;  Start 6/2/17 at 20:30


Oxycodone/ Acetaminophen (Percocet 10/325) 1 tab PRN QID  PRN PO PAIN Last 

administered on 6/8/17at 03:48;  Start 6/2/17 at 20:30


Non-Formulary Medication 50 mg TID  PRN PO PAIN;  Start 6/2/17 at 20:30;  

Status UNV


Rivaroxaban (Xarelto) 15 mg DAILYWSUP PO ;  Start 6/3/17 at 17:00;  Status UNV


Ropinirole HCl (Requip) 0.25 mg QHS PO  Last administered on 6/7/17at 22:08;  

Start 6/2/17 at 21:00


Amiodarone HCl (Cordarone) 200 mg DAILY PO  Last administered on 6/8/17at 08:13

;  Start 6/3/17 at 09:00


Sodium Chloride 1,000 ml @  50 mls/hr Q20H IV  Last administered on 6/8/17at 13:

29;  Start 6/2/17 at 20:30


Fentanyl (Duragesic 50mcg/ Hr Patch) 1 patch Q3DAYS TD  Last administered on 6/8 /17at 08:10;  Start 6/5/17 at 09:00


Apixaban (Eliquis) 2.5 mg BID PO  Last administered on 6/8/17at 08:08;  Start 6/

3/17 at 11:00


Info (Anti-Coagulation Monitoring By Pharmacy) 1 each PRN DAILY  PRN MC SEE 

COMMENTS Last administered on 6/8/17at 12:01;  Start 6/3/17 at 10:45


Regadenoson (Lexiscan) 0.4 mg 1X  ONCE IV  Last administered on 6/5/17at 11:18;

  Start 6/5/17 at 09:30;  Stop 6/5/17 at 09:31;  Status DC


Amylase/Lipase/ Protease (Zenpep 10,000) 2 cap TIDWMEALS PO  Last administered 

on 6/8/17at 11:05;  Start 6/5/17 at 12:00


Diazepam (Valium) 10 mg QID PO  Last administered on 6/8/17at 13:26;  Start 6/5/ 17 at 17:00


Pantoprazole Sodium (Protonix) 40 mg DAILYAC PO  Last administered on 6/8/17at 

06:58;  Start 6/5/17 at 16:30


Iohexol (Omnipaque 300 Mg/ml) 50 ml STK-MED ONCE .ROUTE ;  Start 6/7/17 at 10:22

;  Stop 6/7/17 at 10:23;  Status DC


Gadobutrol (Gadavist) 7.5 mmol STK-MED ONCE .ROUTE ;  Start 6/7/17 at 10:30;  

Stop 6/7/17 at 10:31;  Status DC


Gadobutrol (Gadavist) 7 mmol 1X  ONCE IV  Last administered on 6/7/17at 10:48;  

Start 6/7/17 at 11:00;  Stop 6/7/17 at 11:01;  Status DC


Neomycin/ Polymyxin/ Bacitracin (Triple Antibiotic Ointment) 1 pkt DAILY TP  

Last administered on 6/8/17at 09:40;  Start 6/8/17 at 09:00


Multi-Ingredient Mouthwash/Gargle (Magic Mouthwash) 10 ml PRN QID  PRN PO MOUTH 

PAIN;  Start 6/8/17 at 08:45


Bisacodyl (Dulcolax Tab) 5 mg PRN DAILY  PRN PO CONSTIPATION;  Start 6/8/17 at 

13:30


Polyethylene Glycol (miraLAX PACKET) 17 gm PRN DAILY  PRN PO CONSTIPATION;  

Start 6/8/17 at 13:30





Active Scripts


Active


Reported


Amiodarone Hcl 200 Mg Tablet 1 Tab PO DAILY


Lasix (Furosemide) 20 Mg Tablet 0.5 Tab PO BID


Xarelto (Rivaroxaban) 20 Mg Tablet 20 Mg PO DAILY


Isosorbide Mononitrate Er (Isosorbide Mononitrate) 60 Mg Tab.er.24h 1 Tab PO 

DAILY


Requip (Ropinirole Hcl) 0.5 Mg Tablet 0.25 Tab PO HS


     NEXT DOSE: 11/28/15 EVENING


Demerol (Meperidine Hcl) 50 Mg Tablet 50 Mg PO TID PRN


Percocet  Mg Tablet (Oxycodone/Acetaminophen) 1 Each Tablet 1 Tab PO QID 

PRN


     NEXT DOSE: 11/28/15 AFTERNOON


FENTANYL 50mcg/hr (Fentanyl) 1 Each Patch.td72 1 Patch TP Q3DAYS


     NEXT DOSE: 11/30/15 CHANGE PATCH


Nitrostat (Nitroglycerin) 0.4 Mg Tab.subl 0.4 Mg SL PRN Q5MIN PRN


Zofran Odt (Ondansetron) 4 Mg Tab.rapdis 4 Mg PO Q6HRS PRN


Lisinopril 20 Mg Tablet 1 Tab PO DAILY


     NEXT DOSE: 11/29/15 AM


Potassium Chloride 10 Meq Tablet.er 1 Tab PO DAILY


     NEXT DOSE: 11/29/15 AM


Valium (Diazepam) 10 Mg Tablet 10 Mg PO QID


     NEXT DOSE: 11/28/15 2 PM


Tenormin (Atenolol) 50 Mg Tablet 50 Mg PO BID


     NEXT DOSE: 11/28/15 PM


Vitals/I & O





Vital Sign - Last 24 Hours








 6/7/17 6/7/17 6/7/17 6/7/17





 19:00 20:00 22:09 22:09


 


Temp 97.9   





 97.9   


 


Pulse 65  64 


 


Resp 18   20


 


B/P (MAP) 122/52 (75)  129/43 


 


Pulse Ox 95   95


 


O2 Delivery Room Air Nasal Cannula  Nasal Cannula


 


O2 Flow Rate    2.0


 


    





    





 6/7/17 6/8/17 6/8/17 6/8/17





 23:00 03:00 03:48 04:48


 


Temp 97.7 97.5  





 97.7 97.5  


 


Pulse 63 62  


 


Resp 18 18 18 18


 


B/P (MAP) 147/63 (91) 146/59 (88)  


 


Pulse Ox 92 93 93 93


 


O2 Delivery Room Air Room Air Nasal Cannula Nasal Cannula


 


O2 Flow Rate   2.0 2.0


 


    





    





 6/8/17 6/8/17 6/8/17 6/8/17





 07:00 08:00 08:10 08:13


 


Temp 97.6   





 97.6   


 


Pulse 63   63


 


Resp 18   


 


B/P (MAP) 152/61 (91)   152/61


 


Pulse Ox 93  93 


 


O2 Delivery Room Air Nasal Cannula Nasal Cannula 


 


O2 Flow Rate  2.0 2.0 


 


    





    





 6/8/17 6/8/17 6/8/17 6/8/17





 08:13 08:14 08:14 11:00


 


Temp    98.1





    98.1


 


Pulse 63 63 63 60


 


Resp    18


 


B/P (MAP) 152/61 152/61 152/61 133/62 (85)


 


Pulse Ox    91


 


O2 Delivery    Room Air


 


    





    





 6/8/17   





 12:10   


 


Pulse Ox 91   


 


O2 Delivery Nasal Cannula   


 


O2 Flow Rate 2.0   














Intake and Output   


 


 6/7/17 6/7/17 6/8/17





 15:00 23:00 07:00


 


Intake Total 380 ml 360 ml 


 


Output Total 575 ml  250 ml


 


Balance -195 ml 360 ml -250 ml

















CALVIN NAVARRO MD Jun 8, 2017 15:41

## 2017-06-08 NOTE — PDOC
Provider Note


Provider Note


RENAL F/U : MICHAEL





S : No new issues or c/o


     No abd pain.


            


O : 


VSS


Afebrile.


Neck : Supple


Lungs : Non labored.


CVS : RRR


Abd : Benign in appearance, without distention.


Ext :  No edema


Neuro : Awake.





Labs reviewed.








A/P :


ARF/ATN


HTN w CKD


CKD II





Doing Ok


Cr now 1.2





UOP maintained.








Supportive care.











ADELINE RODRIGUEZ MD Jun 8, 2017 23:46

## 2017-06-09 VITALS — SYSTOLIC BLOOD PRESSURE: 131 MMHG | DIASTOLIC BLOOD PRESSURE: 63 MMHG

## 2017-06-09 VITALS — SYSTOLIC BLOOD PRESSURE: 153 MMHG | DIASTOLIC BLOOD PRESSURE: 49 MMHG

## 2017-06-09 VITALS — SYSTOLIC BLOOD PRESSURE: 121 MMHG | DIASTOLIC BLOOD PRESSURE: 56 MMHG

## 2017-06-09 VITALS — SYSTOLIC BLOOD PRESSURE: 148 MMHG | DIASTOLIC BLOOD PRESSURE: 53 MMHG

## 2017-06-09 VITALS — DIASTOLIC BLOOD PRESSURE: 59 MMHG | SYSTOLIC BLOOD PRESSURE: 146 MMHG

## 2017-06-09 VITALS — SYSTOLIC BLOOD PRESSURE: 126 MMHG | DIASTOLIC BLOOD PRESSURE: 54 MMHG

## 2017-06-09 LAB
ANION GAP SERPL CALC-SCNC: 7 MMOL/L (ref 6–14)
BUN SERPL-MCNC: 19 MG/DL (ref 7–20)
CALCIUM SERPL-MCNC: 7.7 MG/DL (ref 8.5–10.1)
CHLORIDE SERPL-SCNC: 111 MMOL/L (ref 98–107)
CO2 SERPL-SCNC: 25 MMOL/L (ref 21–32)
CREAT SERPL-MCNC: 1.2 MG/DL (ref 0.6–1)
GFR SERPLBLD BASED ON 1.73 SQ M-ARVRAT: 43.3 ML/MIN
GLUCOSE SERPL-MCNC: 85 MG/DL (ref 70–99)
POTASSIUM SERPL-SCNC: 4.4 MMOL/L (ref 3.5–5.1)
SODIUM SERPL-SCNC: 143 MMOL/L (ref 136–145)

## 2017-06-09 RX ADMIN — ISOSORBIDE MONONITRATE SCH MG: 30 TABLET, EXTENDED RELEASE ORAL at 09:37

## 2017-06-09 RX ADMIN — PANTOPRAZOLE SODIUM SCH MG: 40 TABLET, DELAYED RELEASE ORAL at 06:05

## 2017-06-09 RX ADMIN — SODIUM CHLORIDE SCH MLS/HR: 450 INJECTION, SOLUTION INTRAVENOUS at 09:40

## 2017-06-09 RX ADMIN — PANCRELIPASE LIPASE, PANCRELIPASE PROTEASE, PANCRELIPASE AMYLASE SCH CAP: 10000; 32000; 42000 CAPSULE, DELAYED RELEASE ORAL at 12:17

## 2017-06-09 RX ADMIN — OXYCODONE HYDROCHLORIDE AND ACETAMINOPHEN PRN TAB: 10; 325 TABLET ORAL at 18:11

## 2017-06-09 RX ADMIN — ONDANSETRON PRN MG: 4 TABLET, ORALLY DISINTEGRATING ORAL at 12:17

## 2017-06-09 RX ADMIN — APIXABAN SCH MG: 2.5 TABLET, FILM COATED ORAL at 21:09

## 2017-06-09 RX ADMIN — OXYCODONE HYDROCHLORIDE AND ACETAMINOPHEN PRN TAB: 10; 325 TABLET ORAL at 12:18

## 2017-06-09 RX ADMIN — LISINOPRIL SCH MG: 10 TABLET ORAL at 09:36

## 2017-06-09 RX ADMIN — OXYCODONE HYDROCHLORIDE AND ACETAMINOPHEN PRN TAB: 10; 325 TABLET ORAL at 06:05

## 2017-06-09 RX ADMIN — AMIODARONE HYDROCHLORIDE SCH MG: 200 TABLET ORAL at 09:36

## 2017-06-09 RX ADMIN — PANCRELIPASE LIPASE, PANCRELIPASE PROTEASE, PANCRELIPASE AMYLASE SCH CAP: 10000; 32000; 42000 CAPSULE, DELAYED RELEASE ORAL at 09:33

## 2017-06-09 RX ADMIN — ATENOLOL SCH MG: 25 TABLET ORAL at 09:35

## 2017-06-09 RX ADMIN — OXYCODONE HYDROCHLORIDE AND ACETAMINOPHEN PRN TAB: 10; 325 TABLET ORAL at 00:53

## 2017-06-09 RX ADMIN — ATENOLOL SCH MG: 25 TABLET ORAL at 21:08

## 2017-06-09 RX ADMIN — ONDANSETRON PRN MG: 4 TABLET, ORALLY DISINTEGRATING ORAL at 06:04

## 2017-06-09 RX ADMIN — PANCRELIPASE LIPASE, PANCRELIPASE PROTEASE, PANCRELIPASE AMYLASE SCH CAP: 10000; 32000; 42000 CAPSULE, DELAYED RELEASE ORAL at 18:09

## 2017-06-09 RX ADMIN — BACITRACIN ZINC, NEOMYCIN, POLYMYXIN B SCH PKT: 400; 3.5; 5 OINTMENT TOPICAL at 09:37

## 2017-06-09 RX ADMIN — ONDANSETRON PRN MG: 4 TABLET, ORALLY DISINTEGRATING ORAL at 18:09

## 2017-06-09 RX ADMIN — Medication PRN EACH: at 13:48

## 2017-06-09 RX ADMIN — APIXABAN SCH MG: 2.5 TABLET, FILM COATED ORAL at 09:35

## 2017-06-09 RX ADMIN — ROPINIROLE HYDROCHLORIDE SCH MG: 0.25 TABLET, FILM COATED ORAL at 21:09

## 2017-06-09 NOTE — PDOC
GENERAL


General:


vss and afebrile. awake and alert. major complaints are vomiting most of night 

and dysuria. has > 100,000 gram negative rods on repeat ua and will use 

ertapenem to cover both bugs on initial urine culture that had <100,000 colony 

forming units of each.  defer vomiting to GI and await final urine C&S.


Problems:  





VITAL SIGNS


Vital Signs:





Vital Signs








  Date Time  Temp Pulse Resp B/P (MAP) Pulse Ox O2 Delivery O2 Flow Rate FiO2


 


6/9/17 06:05   20  98 Nasal Cannula 2.0 


 


6/9/17 03:00 97.9 65  146/59 (88)    





 97.9       











I & O


I & O











Intake and Output 


 


 6/9/17





 07:00


 


Intake Total 1600 ml


 


Output Total 700 ml


 


Balance 900 ml


 


 


 


Intake Oral 600 ml


 


IV Total 1000 ml


 


Output Urine Total 700 ml


 


# Voids 3


 


# Bowel Movements 2











ALLERGIES


Allergies:





Allergies








Coded Allergies Type Severity Reaction Last Updated Verified


 


  diphenhydramine Allergy Severe Shortness of Air 6/1/16 Yes


 


  NSAIDS (Non-Steroidal Anti-Inflamma Allergy Intermediate All except Feldene 1/

5/15 Yes


 


  Pentazocine Lactate Allergy Intermediate Blood pressure increase, chest pain, 

heart races, itching. 1/5/15 Yes


 


  Trimethobenzamide HCl Allergy Intermediate Severe muscle spasms 1/5/15 Yes


 


  acetaminophen Allergy Intermediate Shallow breathing, slow heart rate, 

stomach pain 1/5/15 Yes


 


  adhesive Allergy Intermediate rash--"will explain." 1/5/15 Yes


 


  butorphanol tartrate Allergy Intermediate Hallucinations, HA, anxiety, rash 1/

5/15 Yes


 


  carisoprodol Allergy Intermediate Fainting, fast heart rate, light-headed 1/5/

15 Yes


 


  chlorzoxazone Allergy Intermediate Nausea, dizzy, rash, feels like I might 

pass out 1/5/15 Yes


 


  codeine Allergy Intermediate Shallow breathing, vomiting, severe HA, 

bradycardia, itching 1/5/15 Yes


 


  codeine phosphate Allergy Intermediate Shallow breathing, slow heart rate, 

stomach pain 1/5/15 Yes


 


  cyclobenzaprine HCl Allergy Intermediate Chest pain,HA, fast heart rate, 

nervous 1/5/15 Yes


 


  erythromycin base Allergy Intermediate Fainting, fast/irreg HR, rash, 

itiching 1/5/15 Yes


 


  hydromorphone HCl Allergy Intermediate Apneic 1/5/15 Yes


 


  iodine Allergy Intermediate Itching, skin rash, hives 1/5/15 Yes


 


  ketorolac tromethamine Allergy Intermediate Chest pain, sudden severe HA 1/5/

15 Yes


 


  levofloxacin Allergy Intermediate Severe dizziness, heart races, itching, 

stomach pain 1/5/15 Yes


 


  midazolam HCl Allergy Intermediate Slow heart rate, affects bld pressure, 

mild rash 1/5/15 Yes


 


  morphine Allergy Intermediate apneic 1/5/15 Yes


 


  nalbuphine HCl Allergy Intermediate Tachycardia, confusion, hallucinations, 

nervous 1/5/15 Yes


 


  I S O L A T I O N *CONTACT* Allergy Unknown  12/8/16 Yes











MEDS


Medications:





Current Medications








 Medications


  (Trade)  Dose


 Ordered  Sig/Laura  Start Time


 Stop Time Status Last Admin


Dose Admin


 


 Amiodarone HCl


  (Cordarone)  200 mg  DAILY  6/3/17 09:00


    6/8/17 08:13


200 MG


 


 Amylase/Lipase/


 Protease


  (Zenpep 10,000)  2 cap  TIDWMEALS  6/5/17 12:00


    6/8/17 17:07


2 CAP


 


 Apixaban


  (Eliquis)  2.5 mg  BID  6/3/17 11:00


    6/8/17 21:34


2.5 MG


 


 Atenolol


  (Tenormin)  25 mg  BID  6/2/17 21:00


    6/8/17 21:34


25 MG


 


 Bisacodyl


  (Dulcolax Tab)  5 mg  PRN DAILY  PRN  6/8/17 13:30


     


 


 


 Diazepam


  (Valium)  10 mg  QID  6/5/17 17:00


    6/8/17 21:34


10 MG


 


 Ertapenem 1 gm/


 Sodium Chloride  50 ml @ 


 100 mls/hr  Q24H  6/9/17 08:15


   UNV  


 


 


 Fentanyl


  (Duragesic 50mcg/


 Hr Patch)  1 patch  Q3DAYS  6/5/17 09:00


    6/8/17 08:10


1 PATCH


 


 Fentanyl Citrate


  (Fentanyl 2ml


 Vial)  50 mcg  PRN Q2HR  PRN  6/2/17 16:45


 6/3/17 16:44 DC  


 


 


 Gadobutrol


  (Gadavist)  7 mmol  1X  ONCE  6/7/17 11:00


 6/7/17 11:01 DC 6/7/17 10:48


7 MMOL


 


 Info


  (Anti-Coagulation


 Monitoring By


 Pharmacy)  1 each  PRN DAILY  PRN  6/3/17 10:45


    6/8/17 12:01


1 EACH


 


 Iohexol


  (Omnipaque 300


 Mg/ml)  50 ml  STK-MED ONCE  6/7/17 10:22


 6/7/17 10:23 DC  


 


 


 Isosorbide


 Mononitrate


  (Imdur)  30 mg  DAILY  6/3/17 09:00


    6/8/17 08:14


30 MG


 


 Lisinopril


  (Prinivil)  10 mg  DAILY  6/3/17 09:00


    6/8/17 08:13


10 MG


 


 Multi-Ingredient


 Mouthwash/Gargle


  (Magic Mouthwash)  10 ml  PRN QID  PRN  6/8/17 08:45


    6/8/17 21:36


10 ML


 


 Neomycin/


 Polymyxin/


 Bacitracin


  (Triple


 Antibiotic


 Ointment)  1 pkt  DAILY  6/8/17 09:00


    6/8/17 09:40


1 PKT


 


 Nitroglycerin


  (Nitrostat)  0.4 mg  PRN Q5MIN  PRN  6/2/17 20:30


     


 


 


 Nitroglycerin/


 Dextrose  250 ml @ 3


 mls/hr  1X  ONCE  6/2/17 16:15


 6/6/17 03:34 DC  


 


 


 Non-Formulary


 Medication  50 mg  TID  PRN  6/2/17 20:30


   UNV  


 


 


 Ondansetron HCl


  (Zofran Odt)  4 mg  PRN Q6HRS  PRN  6/2/17 20:30


    6/9/17 06:04


4 MG


 


 Ondansetron HCl


  (Zofran)  4 mg  PRN Q8HRS  PRN  6/2/17 16:45


 6/3/17 16:44 DC  


 


 


 Oxycodone/


 Acetaminophen


  (Percocet 10/325)  1 tab  PRN QID  PRN  6/2/17 20:30


    6/9/17 06:05


1 TAB


 


 Pantoprazole


 Sodium


  (Protonix)  40 mg  DAILYAC  6/5/17 16:30


    6/9/17 06:05


40 MG


 


 Polyethylene


 Glycol


  (miraLAX PACKET)  17 gm  PRN DAILY  PRN  6/8/17 13:30


     


 


 


 Regadenoson


  (Lexiscan)  0.4 mg  1X  ONCE  6/5/17 09:30


 6/5/17 09:31 DC 6/5/17 11:18


0.4 MG


 


 Rivaroxaban


  (Xarelto)  15 mg  DAILYWSUP  6/3/17 17:00


   UNV  


 


 


 Ropinirole HCl


  (Requip)  0.25 mg  QHS  6/2/17 21:00


    6/8/17 21:34


0.25 MG


 


 Sodium Chloride  1,000 ml @ 


 50 mls/hr  Q20H  6/2/17 20:30


    6/8/17 13:29


50 MLS/HR


 


 Sodium Chloride


  (Normal Saline


 Flush)  10 ml  QSHIFT  PRN  6/2/17 16:15


     


 











LAB


Lab:





Laboratory Tests








Test


  6/9/17


05:40


 


Sodium Level


  143 mmol/L


(136-145)


 


Potassium Level


  4.4 mmol/L


(3.5-5.1)


 


Chloride Level


  111 mmol/L


()


 


Carbon Dioxide Level


  25 mmol/L


(21-32)


 


Anion Gap 7 (6-14) 


 


Blood Urea Nitrogen


  19 mg/dL


(7-20)


 


Creatinine


  1.2 mg/dL


(0.6-1.0)


 


Estimated GFR


(Cockcroft-Gault) 43.3 


 


 


Glucose Level


  85 mg/dL


(70-99)


 


Calcium Level


  7.7 mg/dL


(8.5-10.1)

















APPLKRISTIN MD Jun 9, 2017 08:23

## 2017-06-09 NOTE — PDOC
PROGRESS NOTES


Subjective


Subjective


Patient had complained of some nausea and vomiting earlier but no evidence of 

this was reported by the nurses.





At the time that I saw her she was sitting in the chair and denies having any 

new complaints.





Objective


Objective





Vital Signs








  Date Time  Temp Pulse Resp B/P (MAP) Pulse Ox O2 Delivery O2 Flow Rate FiO2


 


6/9/17 18:11   14     


 


6/9/17 15:00 98.1 70  153/49 (83) 95 Nasal Cannula 2.0 





 98.1       














Intake and Output 


 


 6/9/17





 07:00


 


Intake Total 1600 ml


 


Output Total 700 ml


 


Balance 900 ml


 


 


 


Intake Oral 600 ml


 


IV Total 1000 ml


 


Output Urine Total 700 ml


 


# Voids 3


 


# Bowel Movements 2











Physical Exam


Physical Exam


No significant changes in cardiac exam





Assessment


Assessment


Patient appears to be stable cardiac wise.





Unless further cardiac problems develop I we will sign off.





May go home when okay with Dr. Chen.





Thank you





Comment


Review of Relevant


I have reviewed the following items christopher (where applicable) has been applied.


Labs





Laboratory Tests








Test


  6/8/17


05:00 6/9/17


05:40


 


Sodium Level


  144 mmol/L


(136-145) 143 mmol/L


(136-145)


 


Potassium Level


  4.4 mmol/L


(3.5-5.1) 4.4 mmol/L


(3.5-5.1)


 


Chloride Level


  112 mmol/L


() 111 mmol/L


()


 


Carbon Dioxide Level


  25 mmol/L


(21-32) 25 mmol/L


(21-32)


 


Anion Gap 7 (6-14)  7 (6-14) 


 


Blood Urea Nitrogen


  16 mg/dL


(7-20) 19 mg/dL


(7-20)


 


Creatinine


  1.2 mg/dL


(0.6-1.0) 1.2 mg/dL


(0.6-1.0)


 


Estimated GFR


(Cockcroft-Gault) 43.3 


  43.3 


 


 


Glucose Level


  94 mg/dL


(70-99) 85 mg/dL


(70-99)


 


Calcium Level


  8.0 mg/dL


(8.5-10.1) 7.7 mg/dL


(8.5-10.1)








Laboratory Tests








Test


  6/9/17


05:40


 


Sodium Level


  143 mmol/L


(136-145)


 


Potassium Level


  4.4 mmol/L


(3.5-5.1)


 


Chloride Level


  111 mmol/L


()


 


Carbon Dioxide Level


  25 mmol/L


(21-32)


 


Anion Gap 7 (6-14) 


 


Blood Urea Nitrogen


  19 mg/dL


(7-20)


 


Creatinine


  1.2 mg/dL


(0.6-1.0)


 


Estimated GFR


(Cockcroft-Gault) 43.3 


 


 


Glucose Level


  85 mg/dL


(70-99)


 


Calcium Level


  7.7 mg/dL


(8.5-10.1)








Microbiology


6/7/17 Urine Culture - Final, Complete


         


6/7/17 Urine Culture Result 1 (TAY) - Final, Complete


         


6/7/17 Antimicrobic Susceptibility - Final, Complete


Medications





Current Medications


Nitroglycerin (Nitrostat) 0.4 mg PRN Q5MIN  PRN SL CP RATING > 1/10;  Start 6/2/ 17 at 16:15;  Stop 6/2/17 at 20:42;  Status DC


Nitroglycerin/ Dextrose 250 ml @ 3 mls/hr 1X  ONCE IV ;  Start 6/2/17 at 16:15;

  Stop 6/6/17 at 03:34;  Status DC


Sodium Chloride 1,000 ml @  1,000 mls/hr Q1H IV  Last administered on 6/2/17at 

16:31;  Start 6/2/17 at 16:30;  Stop 6/2/17 at 17:29;  Status DC


Sodium Chloride (Normal Saline Flush) 10 ml QSHIFT  PRN IV AFTER MEDS AND BLOOD 

DRAWS;  Start 6/2/17 at 16:15


Ondansetron HCl (Zofran) 4 mg PRN Q8HRS  PRN IV NAUSEA/VOMITING;  Start 6/2/17 

at 16:45;  Stop 6/3/17 at 16:44;  Status DC


Fentanyl Citrate (Fentanyl 2ml Vial) 50 mcg PRN Q2HR  PRN IV PAIN;  Start 6/2/ 17 at 16:45;  Stop 6/3/17 at 16:44;  Status DC


Sodium Chloride 1,000 ml @  100 mls/hr Q10H IV  Last administered on 6/2/17at 18

:08;  Start 6/2/17 at 16:43;  Stop 6/3/17 at 16:42;  Status DC


Nitroglycerin (Nitrostat) 0.4 mg PRN Q5MIN  PRN SL CHEST PAIN;  Start 6/2/17 at 

16:45;  Stop 6/2/17 at 20:42;  Status DC


Atenolol (Tenormin) 25 mg BID PO  Last administered on 6/9/17at 09:35;  Start 6/ 2/17 at 21:00


Fentanyl (Duragesic 50mcg/ Hr Patch) 1 patch Q3DAYS TD ;  Start 6/3/17 at 09:00

;  Stop 6/3/17 at 09:00;  Status DC


Isosorbide Mononitrate (Imdur) 30 mg DAILY PO  Last administered on 6/9/17at 09:

37;  Start 6/3/17 at 09:00


Lisinopril (Prinivil) 10 mg DAILY PO  Last administered on 6/9/17at 09:36;  

Start 6/3/17 at 09:00


Nitroglycerin (Nitrostat) 0.4 mg PRN Q5MIN  PRN SL CHEST PAIN;  Start 6/2/17 at 

20:30


Ondansetron HCl (Zofran Odt) 4 mg PRN Q6HRS  PRN PO NAUSEA/VOMITING Last 

administered on 6/9/17at 18:09;  Start 6/2/17 at 20:30


Oxycodone/ Acetaminophen (Percocet 10/325) 1 tab PRN QID  PRN PO PAIN Last 

administered on 6/9/17at 18:11;  Start 6/2/17 at 20:30


Non-Formulary Medication 50 mg TID  PRN PO PAIN;  Start 6/2/17 at 20:30;  

Status UNV


Rivaroxaban (Xarelto) 15 mg DAILYWSUP PO ;  Start 6/3/17 at 17:00;  Status UNV


Ropinirole HCl (Requip) 0.25 mg QHS PO  Last administered on 6/8/17at 21:34;  

Start 6/2/17 at 21:00


Amiodarone HCl (Cordarone) 200 mg DAILY PO  Last administered on 6/9/17at 09:36

;  Start 6/3/17 at 09:00


Sodium Chloride 1,000 ml @  50 mls/hr Q20H IV  Last administered on 6/9/17at 09:

40;  Start 6/2/17 at 20:30


Fentanyl (Duragesic 50mcg/ Hr Patch) 1 patch Q3DAYS TD  Last administered on 6/8 /17at 08:10;  Start 6/5/17 at 09:00


Apixaban (Eliquis) 2.5 mg BID PO  Last administered on 6/9/17at 09:35;  Start 6/

3/17 at 11:00


Info (Anti-Coagulation Monitoring By Pharmacy) 1 each PRN DAILY  PRN MC SEE 

COMMENTS Last administered on 6/9/17at 13:48;  Start 6/3/17 at 10:45


Regadenoson (Lexiscan) 0.4 mg 1X  ONCE IV  Last administered on 6/5/17at 11:18;

  Start 6/5/17 at 09:30;  Stop 6/5/17 at 09:31;  Status DC


Amylase/Lipase/ Protease (Zenpep 10,000) 2 cap TIDWMEALS PO  Last administered 

on 6/9/17at 18:09;  Start 6/5/17 at 12:00


Diazepam (Valium) 10 mg QID PO  Last administered on 6/9/17at 18:09;  Start 6/5/ 17 at 17:00


Pantoprazole Sodium (Protonix) 40 mg DAILYAC PO  Last administered on 6/9/17at 

06:05;  Start 6/5/17 at 16:30


Iohexol (Omnipaque 300 Mg/ml) 50 ml STK-MED ONCE .ROUTE ;  Start 6/7/17 at 10:22

;  Stop 6/7/17 at 10:23;  Status DC


Gadobutrol (Gadavist) 7.5 mmol STK-MED ONCE .ROUTE ;  Start 6/7/17 at 10:30;  

Stop 6/7/17 at 10:31;  Status DC


Gadobutrol (Gadavist) 7 mmol 1X  ONCE IV  Last administered on 6/7/17at 10:48;  

Start 6/7/17 at 11:00;  Stop 6/7/17 at 11:01;  Status DC


Neomycin/ Polymyxin/ Bacitracin (Triple Antibiotic Ointment) 1 pkt DAILY TP  

Last administered on 6/9/17at 09:37;  Start 6/8/17 at 09:00


Multi-Ingredient Mouthwash/Gargle (Magic Mouthwash) 10 ml PRN QID  PRN PO MOUTH 

PAIN Last administered on 6/8/17at 21:36;  Start 6/8/17 at 08:45


Bisacodyl (Dulcolax Tab) 5 mg PRN DAILY  PRN PO CONSTIPATION;  Start 6/8/17 at 

13:30


Polyethylene Glycol (miraLAX PACKET) 17 gm PRN DAILY  PRN PO CONSTIPATION;  

Start 6/8/17 at 13:30


Ertapenem 1 gm/ Sodium Chloride 50 ml @  100 mls/hr Q24H IV  Last administered 

on 6/9/17at 09:47;  Start 6/9/17 at 09:00





Active Scripts


Active


Reported


Amiodarone Hcl 200 Mg Tablet 1 Tab PO DAILY


Lasix (Furosemide) 20 Mg Tablet 0.5 Tab PO BID


Xarelto (Rivaroxaban) 20 Mg Tablet 20 Mg PO DAILY


Isosorbide Mononitrate Er (Isosorbide Mononitrate) 60 Mg Tab.er.24h 1 Tab PO 

DAILY


Requip (Ropinirole Hcl) 0.5 Mg Tablet 0.25 Tab PO HS


     NEXT DOSE: 11/28/15 EVENING


Demerol (Meperidine Hcl) 50 Mg Tablet 50 Mg PO TID PRN


Percocet  Mg Tablet (Oxycodone/Acetaminophen) 1 Each Tablet 1 Tab PO QID 

PRN


     NEXT DOSE: 11/28/15 AFTERNOON


FENTANYL 50mcg/hr (Fentanyl) 1 Each Patch.td72 1 Patch TP Q3DAYS


     NEXT DOSE: 11/30/15 CHANGE PATCH


Nitrostat (Nitroglycerin) 0.4 Mg Tab.subl 0.4 Mg SL PRN Q5MIN PRN


Zofran Odt (Ondansetron) 4 Mg Tab.rapdis 4 Mg PO Q6HRS PRN


Lisinopril 20 Mg Tablet 1 Tab PO DAILY


     NEXT DOSE: 11/29/15 AM


Potassium Chloride 10 Meq Tablet.er 1 Tab PO DAILY


     NEXT DOSE: 11/29/15 AM


Valium (Diazepam) 10 Mg Tablet 10 Mg PO QID


     NEXT DOSE: 11/28/15 2 PM


Tenormin (Atenolol) 50 Mg Tablet 50 Mg PO BID


     NEXT DOSE: 11/28/15 PM


Vitals/I & O





Vital Sign - Last 24 Hours








 6/8/17 6/8/17 6/8/17 6/8/17





 19:00 20:00 21:34 22:51


 


Temp 99.9   98.2





 99.9   98.2


 


Pulse 80  78 82


 


Resp 18   18


 


B/P (MAP) 148/58 (88)  134/57 138/55 (82)


 


Pulse Ox 90   91


 


O2 Delivery Nasal Cannula Nasal Cannula  Nasal Cannula


 


O2 Flow Rate 2.0 2.0  2.0


 


    





    





 6/9/17 6/9/17 6/9/17 6/9/17





 00:53 03:00 06:05 07:00


 


Temp  97.9  





  97.9  


 


Pulse  65  


 


Resp 20 18 20 


 


B/P (MAP)  146/59 (88)  


 


Pulse Ox 91 98 98 89


 


O2 Delivery Nasal Cannula Nasal Cannula Nasal Cannula 


 


O2 Flow Rate 2.0 2.0 2.0 2.0


 


    





    





 6/9/17 6/9/17 6/9/17 6/9/17





 07:00 08:00 09:35 09:36


 


Temp 98.2   





 98.2   


 


Pulse 64  65 64


 


Resp 17   


 


B/P (MAP) 131/63 (85)  138/55 131/63


 


Pulse Ox 89   


 


O2 Delivery Nasal Cannula Nasal Cannula  


 


O2 Flow Rate 2.0 2.0  


 


    





    





 6/9/17 6/9/17 6/9/17 6/9/17





 09:36 09:37 10:59 12:18


 


Temp   98.2 





   98.2 


 


Pulse 64 64 69 


 


Resp   17 14


 


B/P (MAP) 131/63 131/63 148/53 (84) 


 


Pulse Ox   96 


 


O2 Delivery   Nasal Cannula Nasal Cannula


 


O2 Flow Rate   2.0 2.0


 


    





    





 6/9/17 6/9/17 6/9/17 





 13:18 15:00 18:11 


 


Temp  98.1  





  98.1  


 


Pulse  70  


 


Resp 13 17 14 


 


B/P (MAP)  153/49 (83)  


 


Pulse Ox  95  


 


O2 Delivery Nasal Cannula Nasal Cannula  


 


O2 Flow Rate  2.0  














Intake and Output   


 


 6/8/17 6/8/17 6/9/17





 15:00 23:00 07:00


 


Intake Total 1000 ml 600 ml 


 


Output Total   700 ml


 


Balance 1000 ml 600 ml -700 ml

















CALVIN NAVARRO MD Jun 9, 2017 18:18

## 2017-06-09 NOTE — PDOC
Provider Note


Provider Note


RENAL F/U : MICHAEL





S : No new issues or c/o


     No abd pain.


            


O : 


VSS


Afebrile.


Neck : Supple


Lungs : Non labored.


CVS : RRR


Abd : Benign in appearance, without distention.


Ext :  No edema


Neuro : Awake.





Labs reviewed.








A/P :


ARF/ATN


HTN w CKD


CKD II





Doing Ok


Cr now 1.2





UOP maintained.








Supportive care.











ADELINE RODRIGUEZ MD Jun 9, 2017 23:49

## 2017-06-09 NOTE — PDOC
Subjective:


Subjective:


Abd pain, hip pain, kidney pain, vomiting.





Objective:


Objective:


Per RN - pt reports vomiting overnight, no mention of this in nursing notes/

report, not witnessed.


Because she reported vomiting, staff suggested not eating breakfast which she 

was very upset about.


All over 10/10 pain all the time.


Gets up and walks alone, then reports she can't.





Now on ertapenem for UTI.


Vital Signs:





 Vital Signs








  Date Time  Temp Pulse Resp B/P (MAP) Pulse Ox O2 Delivery O2 Flow Rate FiO2


 


6/9/17 12:18   14   Nasal Cannula 2.0 


 


6/9/17 10:59 98.2 69  148/53 (84) 96   





 98.2       








Labs:





Laboratory Tests








Test


  6/9/17


05:40


 


Sodium Level 143 mmol/L 


 


Potassium Level 4.4 mmol/L 


 


Chloride Level 111 mmol/L 


 


Carbon Dioxide Level 25 mmol/L 


 


Anion Gap 7 


 


Blood Urea Nitrogen 19 mg/dL 


 


Creatinine 1.2 mg/dL 


 


Estimated GFR


(Cockcroft-Gault) 43.3 


 


 


Glucose Level 85 mg/dL 


 


Calcium Level 7.7 mg/dL 











PE:





GEN: NAD


LUNGS: clear


HEART: RRR


ABD: epigastric pain to right flank


NEURO/PSYCH: A & O 3





A/P:


Vomiting


-reported by pt, unwitnessed by staff, tolerating PO





GERD/dyspepsia


-was taking viscous lidocaine and Tums at home, started PPI here on 6/5





Epigastric pain





Alternating bowel habits


-h/o ?pancreatic insufficiency/chronic pancreatitis, collagenous colitis - 

diarrhea improved w/ pancreatic enzymes


-has Miralax PRN 





UTI


-now on ertapenem, per primary





--


Unreliable historian.


Continue current GI plan.











JAZMIN ASHFORD Jun 9, 2017 13:09

## 2017-06-10 VITALS — SYSTOLIC BLOOD PRESSURE: 144 MMHG | DIASTOLIC BLOOD PRESSURE: 44 MMHG

## 2017-06-10 VITALS — SYSTOLIC BLOOD PRESSURE: 122 MMHG | DIASTOLIC BLOOD PRESSURE: 40 MMHG

## 2017-06-10 VITALS — DIASTOLIC BLOOD PRESSURE: 48 MMHG | SYSTOLIC BLOOD PRESSURE: 94 MMHG

## 2017-06-10 VITALS — SYSTOLIC BLOOD PRESSURE: 103 MMHG | DIASTOLIC BLOOD PRESSURE: 35 MMHG

## 2017-06-10 VITALS — DIASTOLIC BLOOD PRESSURE: 56 MMHG | SYSTOLIC BLOOD PRESSURE: 160 MMHG

## 2017-06-10 VITALS — SYSTOLIC BLOOD PRESSURE: 100 MMHG | DIASTOLIC BLOOD PRESSURE: 45 MMHG

## 2017-06-10 VITALS — SYSTOLIC BLOOD PRESSURE: 152 MMHG | DIASTOLIC BLOOD PRESSURE: 66 MMHG

## 2017-06-10 LAB
ANION GAP SERPL CALC-SCNC: 7 MMOL/L (ref 6–14)
BUN SERPL-MCNC: 19 MG/DL (ref 7–20)
CALCIUM SERPL-MCNC: 8.2 MG/DL (ref 8.5–10.1)
CHLORIDE SERPL-SCNC: 110 MMOL/L (ref 98–107)
CO2 SERPL-SCNC: 26 MMOL/L (ref 21–32)
CREAT SERPL-MCNC: 1.3 MG/DL (ref 0.6–1)
GFR SERPLBLD BASED ON 1.73 SQ M-ARVRAT: 39.5 ML/MIN
GLUCOSE SERPL-MCNC: 97 MG/DL (ref 70–99)
POTASSIUM SERPL-SCNC: 4.3 MMOL/L (ref 3.5–5.1)
SODIUM SERPL-SCNC: 143 MMOL/L (ref 136–145)

## 2017-06-10 RX ADMIN — OXYCODONE HYDROCHLORIDE AND ACETAMINOPHEN PRN TAB: 10; 325 TABLET ORAL at 06:50

## 2017-06-10 RX ADMIN — PANCRELIPASE LIPASE, PANCRELIPASE PROTEASE, PANCRELIPASE AMYLASE SCH CAP: 10000; 32000; 42000 CAPSULE, DELAYED RELEASE ORAL at 12:42

## 2017-06-10 RX ADMIN — FUROSEMIDE SCH MG: 20 TABLET ORAL at 12:43

## 2017-06-10 RX ADMIN — ROPINIROLE HYDROCHLORIDE SCH MG: 0.25 TABLET, FILM COATED ORAL at 21:33

## 2017-06-10 RX ADMIN — PANCRELIPASE LIPASE, PANCRELIPASE PROTEASE, PANCRELIPASE AMYLASE SCH CAP: 10000; 32000; 42000 CAPSULE, DELAYED RELEASE ORAL at 16:45

## 2017-06-10 RX ADMIN — Medication PRN EACH: at 12:57

## 2017-06-10 RX ADMIN — BACITRACIN ZINC, NEOMYCIN, POLYMYXIN B SCH PKT: 400; 3.5; 5 OINTMENT TOPICAL at 08:08

## 2017-06-10 RX ADMIN — APIXABAN SCH MG: 2.5 TABLET, FILM COATED ORAL at 21:33

## 2017-06-10 RX ADMIN — ONDANSETRON PRN MG: 4 TABLET, ORALLY DISINTEGRATING ORAL at 00:29

## 2017-06-10 RX ADMIN — PANTOPRAZOLE SODIUM SCH MG: 40 TABLET, DELAYED RELEASE ORAL at 08:06

## 2017-06-10 RX ADMIN — OXYCODONE HYDROCHLORIDE AND ACETAMINOPHEN PRN TAB: 10; 325 TABLET ORAL at 00:29

## 2017-06-10 RX ADMIN — LISINOPRIL SCH MG: 10 TABLET ORAL at 08:08

## 2017-06-10 RX ADMIN — AMIODARONE HYDROCHLORIDE SCH MG: 200 TABLET ORAL at 08:08

## 2017-06-10 RX ADMIN — FUROSEMIDE SCH MG: 20 TABLET ORAL at 16:44

## 2017-06-10 RX ADMIN — OXYCODONE HYDROCHLORIDE AND ACETAMINOPHEN PRN TAB: 10; 325 TABLET ORAL at 19:54

## 2017-06-10 RX ADMIN — ONDANSETRON PRN MG: 4 TABLET, ORALLY DISINTEGRATING ORAL at 12:43

## 2017-06-10 RX ADMIN — ONDANSETRON PRN MG: 4 TABLET, ORALLY DISINTEGRATING ORAL at 06:50

## 2017-06-10 RX ADMIN — ATENOLOL SCH MG: 25 TABLET ORAL at 23:16

## 2017-06-10 RX ADMIN — APIXABAN SCH MG: 2.5 TABLET, FILM COATED ORAL at 08:09

## 2017-06-10 RX ADMIN — OXYCODONE HYDROCHLORIDE AND ACETAMINOPHEN PRN TAB: 10; 325 TABLET ORAL at 12:43

## 2017-06-10 RX ADMIN — ONDANSETRON PRN MG: 4 TABLET, ORALLY DISINTEGRATING ORAL at 19:53

## 2017-06-10 RX ADMIN — PANCRELIPASE LIPASE, PANCRELIPASE PROTEASE, PANCRELIPASE AMYLASE SCH CAP: 10000; 32000; 42000 CAPSULE, DELAYED RELEASE ORAL at 08:07

## 2017-06-10 RX ADMIN — ISOSORBIDE MONONITRATE SCH MG: 30 TABLET, EXTENDED RELEASE ORAL at 08:08

## 2017-06-10 RX ADMIN — ATENOLOL SCH MG: 25 TABLET ORAL at 08:08

## 2017-06-10 RX ADMIN — POTASSIUM CHLORIDE SCH MEQ: 750 TABLET, FILM COATED, EXTENDED RELEASE ORAL at 12:43

## 2017-06-10 NOTE — PDOC
Provider Note


Provider Note


vss, got orthopna last pm so stopped iv fluid, back on mild lasix- e coli so 

change to ancef, then keflex 6/11 likely











CAMILA DE OLIVEIRA MD Syed 10, 2017 10:59

## 2017-06-10 NOTE — RAD
Chest AP portable at 0302:

 

Reason for examination: Sudden onset of shortness of breath and low O2 

saturation tonight.

 

Comparison is made to previous study dated 6/2/2017.

 

Port-A-Cath remains present on the left. Heart size is normal. Mediastinum

is unremarkable. Lung fields show diffuse increased interstitial markings 

throughout both lung fields which suggest interstitial edema. There is 

some blunting at the costophrenic angle on the right suggesting a small 

right pleural effusion. No acute bony abnormalities are seen.

 

IMPRESSION:

 

Diffusely increased interstitial markings bilaterally consistent with 

interstitial edema. Small right pleural effusion.

 

Electronically signed by: Florecita Augustin MD (6/10/2017 3:23 AM)

## 2017-06-11 VITALS — SYSTOLIC BLOOD PRESSURE: 139 MMHG | DIASTOLIC BLOOD PRESSURE: 45 MMHG

## 2017-06-11 VITALS — DIASTOLIC BLOOD PRESSURE: 50 MMHG | SYSTOLIC BLOOD PRESSURE: 152 MMHG

## 2017-06-11 VITALS — SYSTOLIC BLOOD PRESSURE: 90 MMHG | DIASTOLIC BLOOD PRESSURE: 43 MMHG

## 2017-06-11 VITALS — SYSTOLIC BLOOD PRESSURE: 109 MMHG | DIASTOLIC BLOOD PRESSURE: 35 MMHG

## 2017-06-11 VITALS — DIASTOLIC BLOOD PRESSURE: 49 MMHG | SYSTOLIC BLOOD PRESSURE: 93 MMHG

## 2017-06-11 VITALS — DIASTOLIC BLOOD PRESSURE: 32 MMHG | SYSTOLIC BLOOD PRESSURE: 82 MMHG

## 2017-06-11 RX ADMIN — LISINOPRIL SCH MG: 10 TABLET ORAL at 09:07

## 2017-06-11 RX ADMIN — ROPINIROLE HYDROCHLORIDE SCH MG: 0.25 TABLET, FILM COATED ORAL at 20:56

## 2017-06-11 RX ADMIN — ATENOLOL SCH MG: 25 TABLET ORAL at 09:06

## 2017-06-11 RX ADMIN — FUROSEMIDE SCH MG: 20 TABLET ORAL at 14:34

## 2017-06-11 RX ADMIN — ATENOLOL SCH MG: 25 TABLET ORAL at 21:00

## 2017-06-11 RX ADMIN — OXYCODONE HYDROCHLORIDE AND ACETAMINOPHEN PRN TAB: 10; 325 TABLET ORAL at 20:12

## 2017-06-11 RX ADMIN — Medication PRN EACH: at 13:13

## 2017-06-11 RX ADMIN — BACITRACIN ZINC, NEOMYCIN, POLYMYXIN B SCH PKT: 400; 3.5; 5 OINTMENT TOPICAL at 09:07

## 2017-06-11 RX ADMIN — APIXABAN SCH MG: 2.5 TABLET, FILM COATED ORAL at 09:09

## 2017-06-11 RX ADMIN — PANTOPRAZOLE SODIUM SCH MG: 40 TABLET, DELAYED RELEASE ORAL at 05:56

## 2017-06-11 RX ADMIN — AMIODARONE HYDROCHLORIDE SCH MG: 200 TABLET ORAL at 09:08

## 2017-06-11 RX ADMIN — APIXABAN SCH MG: 2.5 TABLET, FILM COATED ORAL at 20:56

## 2017-06-11 RX ADMIN — ONDANSETRON PRN MG: 4 TABLET, ORALLY DISINTEGRATING ORAL at 12:01

## 2017-06-11 RX ADMIN — OXYCODONE HYDROCHLORIDE AND ACETAMINOPHEN PRN TAB: 10; 325 TABLET ORAL at 12:01

## 2017-06-11 RX ADMIN — PANCRELIPASE LIPASE, PANCRELIPASE PROTEASE, PANCRELIPASE AMYLASE SCH CAP: 10000; 32000; 42000 CAPSULE, DELAYED RELEASE ORAL at 11:57

## 2017-06-11 RX ADMIN — FUROSEMIDE SCH MG: 20 TABLET ORAL at 09:08

## 2017-06-11 RX ADMIN — POTASSIUM CHLORIDE SCH MEQ: 750 TABLET, FILM COATED, EXTENDED RELEASE ORAL at 09:07

## 2017-06-11 RX ADMIN — PANCRELIPASE LIPASE, PANCRELIPASE PROTEASE, PANCRELIPASE AMYLASE SCH CAP: 10000; 32000; 42000 CAPSULE, DELAYED RELEASE ORAL at 08:04

## 2017-06-11 RX ADMIN — PANCRELIPASE LIPASE, PANCRELIPASE PROTEASE, PANCRELIPASE AMYLASE SCH CAP: 10000; 32000; 42000 CAPSULE, DELAYED RELEASE ORAL at 17:22

## 2017-06-11 RX ADMIN — OXYCODONE HYDROCHLORIDE AND ACETAMINOPHEN PRN TAB: 10; 325 TABLET ORAL at 01:08

## 2017-06-11 RX ADMIN — ONDANSETRON PRN MG: 4 TABLET, ORALLY DISINTEGRATING ORAL at 20:08

## 2017-06-11 RX ADMIN — ISOSORBIDE MONONITRATE SCH MG: 30 TABLET, EXTENDED RELEASE ORAL at 09:08

## 2017-06-11 RX ADMIN — CEPHALEXIN SCH MG: 250 CAPSULE ORAL at 20:56

## 2017-06-11 RX ADMIN — FENTANYL SCH PATCH: 50 PATCH, EXTENDED RELEASE TRANSDERMAL at 09:07

## 2017-06-11 NOTE — PDOC
Provider Note


Provider Note


feels she is not emptying her blsadder, no palpable mass but will scan post void

- go to po keflex now, states she feels too weak to go home today- bmp CAMILA Whitt MD Jun 11, 2017 11:08

## 2017-06-12 VITALS — SYSTOLIC BLOOD PRESSURE: 83 MMHG | DIASTOLIC BLOOD PRESSURE: 40 MMHG

## 2017-06-12 VITALS — SYSTOLIC BLOOD PRESSURE: 109 MMHG | DIASTOLIC BLOOD PRESSURE: 65 MMHG

## 2017-06-12 VITALS — SYSTOLIC BLOOD PRESSURE: 146 MMHG | DIASTOLIC BLOOD PRESSURE: 51 MMHG

## 2017-06-12 VITALS — DIASTOLIC BLOOD PRESSURE: 43 MMHG | SYSTOLIC BLOOD PRESSURE: 102 MMHG

## 2017-06-12 VITALS — SYSTOLIC BLOOD PRESSURE: 112 MMHG | DIASTOLIC BLOOD PRESSURE: 43 MMHG

## 2017-06-12 VITALS — DIASTOLIC BLOOD PRESSURE: 52 MMHG | SYSTOLIC BLOOD PRESSURE: 96 MMHG

## 2017-06-12 VITALS — DIASTOLIC BLOOD PRESSURE: 32 MMHG | SYSTOLIC BLOOD PRESSURE: 102 MMHG

## 2017-06-12 VITALS — DIASTOLIC BLOOD PRESSURE: 46 MMHG | SYSTOLIC BLOOD PRESSURE: 152 MMHG

## 2017-06-12 RX ADMIN — CEPHALEXIN SCH MG: 250 CAPSULE ORAL at 22:14

## 2017-06-12 RX ADMIN — APIXABAN SCH MG: 2.5 TABLET, FILM COATED ORAL at 22:14

## 2017-06-12 RX ADMIN — OXYCODONE HYDROCHLORIDE AND ACETAMINOPHEN PRN TAB: 10; 325 TABLET ORAL at 22:23

## 2017-06-12 RX ADMIN — OXYCODONE HYDROCHLORIDE AND ACETAMINOPHEN PRN TAB: 10; 325 TABLET ORAL at 13:10

## 2017-06-12 RX ADMIN — BACITRACIN ZINC, NEOMYCIN, POLYMYXIN B SCH PKT: 400; 3.5; 5 OINTMENT TOPICAL at 08:46

## 2017-06-12 RX ADMIN — ATENOLOL SCH MG: 25 TABLET ORAL at 23:01

## 2017-06-12 RX ADMIN — POTASSIUM CHLORIDE SCH MEQ: 750 TABLET, FILM COATED, EXTENDED RELEASE ORAL at 08:47

## 2017-06-12 RX ADMIN — ATENOLOL SCH MG: 25 TABLET ORAL at 09:00

## 2017-06-12 RX ADMIN — ISOSORBIDE MONONITRATE SCH MG: 30 TABLET, EXTENDED RELEASE ORAL at 12:07

## 2017-06-12 RX ADMIN — ATENOLOL SCH MG: 25 TABLET ORAL at 21:00

## 2017-06-12 RX ADMIN — AMIODARONE HYDROCHLORIDE SCH MG: 200 TABLET ORAL at 12:07

## 2017-06-12 RX ADMIN — CEPHALEXIN SCH MG: 250 CAPSULE ORAL at 08:47

## 2017-06-12 RX ADMIN — ONDANSETRON PRN MG: 4 TABLET, ORALLY DISINTEGRATING ORAL at 04:33

## 2017-06-12 RX ADMIN — PANCRELIPASE LIPASE, PANCRELIPASE PROTEASE, PANCRELIPASE AMYLASE SCH CAP: 10000; 32000; 42000 CAPSULE, DELAYED RELEASE ORAL at 08:46

## 2017-06-12 RX ADMIN — PANTOPRAZOLE SODIUM SCH MG: 40 TABLET, DELAYED RELEASE ORAL at 06:38

## 2017-06-12 RX ADMIN — APIXABAN SCH MG: 2.5 TABLET, FILM COATED ORAL at 08:46

## 2017-06-12 RX ADMIN — ROPINIROLE HYDROCHLORIDE SCH MG: 0.25 TABLET, FILM COATED ORAL at 22:13

## 2017-06-12 RX ADMIN — Medication PRN EACH: at 12:19

## 2017-06-12 RX ADMIN — OXYCODONE HYDROCHLORIDE AND ACETAMINOPHEN PRN TAB: 10; 325 TABLET ORAL at 06:39

## 2017-06-12 RX ADMIN — PANCRELIPASE LIPASE, PANCRELIPASE PROTEASE, PANCRELIPASE AMYLASE SCH CAP: 10000; 32000; 42000 CAPSULE, DELAYED RELEASE ORAL at 17:27

## 2017-06-12 RX ADMIN — PANCRELIPASE LIPASE, PANCRELIPASE PROTEASE, PANCRELIPASE AMYLASE SCH CAP: 10000; 32000; 42000 CAPSULE, DELAYED RELEASE ORAL at 12:06

## 2017-06-12 NOTE — PDOC
Renal-Progress Notes


Subjective Notes


Notes


NONE





History of Present Illness


Hx of present illness


NO CHANGE





Vitals


Vitals





Vital Signs








  Date Time  Temp Pulse Resp B/P (MAP) Pulse Ox O2 Delivery O2 Flow Rate FiO2


 


6/12/17 08:00      Nasal Cannula 2.0 


 


6/12/17 07:39   20     


 


6/12/17 07:00 97.7 59  96/52 (67) 85   





 97.7       








Weight


Weight [ ]





I.O.


Intake and Output











Intake and Output 


 


 6/12/17





 07:00


 


Intake Total 480 ml


 


Output Total 750 ml


 


Balance -270 ml


 


 


 


Intake Oral 480 ml


 


Output Urine Total 750 ml











Micro


Micro





Microbiology


6/7/17 Urine Culture - Final, Complete


         


6/7/17 Urine Culture Result 1 (TAY) - Final, Complete


         


6/7/17 Antimicrobic Susceptibility - Final, Complete





Review of Systems


Constitutional:  yes: weakness, alert


Ears/Nose/Throat:  Yes: no symptom reported


Eyes:  Yes: no symptom reported


Gastrointestional:  Yes: diarrhea


Musculoskeletal:  Yes: no symptom reported


Skin:  Yes no symptom reported





Physical Exam


General Appearance:  no apparent distress


Skin:  warm


Respiratory:  decreased breath sounds


Heart:  S1S2


Abdomen:  soft, bowel sounds present


Neurology:  alert


Musculoskeletal:  Other





Assessment


Assessment


IMP





UTI


SANG BETTER WITH CR 4.8 TO 1.3


ANEMIA


HTN


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





PLAN





STOP HER LISINOPRIL


PROB NOT IDEAL DUE TO CHANGE OF BEING DEHYDRATED ALWAYS


WILL CONSIDER ANOTHER AGENT FOR HTN IF NEEDED


ALSO STOP LASIX FOR NOW











LUISANA SALCIDO MD Jun 12, 2017 11:16

## 2017-06-12 NOTE — PATHOLOGY
PATHOLOGY REPORT 

 

*    *    *    *    *    *    *    * 

 FINAL DIAGNOSIS:

Skin, right hip:  

- Angiokeratoma, congested.  There is no evidence of malignancy. 

(SAS:mgr; d/t: 17)   

 

 

REPORT ELECTRONICALLY SIGNED BY:   Renée Contreras M.D.,

Dermatopathologist

DATE/TIME:   2017 16:48

*    *    *    *    *    *    *    *

 

GROSS PATHOLOGY:

Received in formalin labeled "Amor Acosta, skin right hip," is a

punch biopsy measuring 0.4 x 0.4 x 0.5 cm in greatest dimensions. 

The epidermal surface displays a poorly circumscribed, flat, firm,

dark brown lesion measuring 0.3 x 0.3 cm.  The margin is inked, and

the specimen is entirely submitted in cassette A1.

([facility]; 2017)

 

 

 INITIAL CPT CODE(S):

A; 97528

Professional services performed by [facility], [address] [place] [address].  Technical services performed by [facility] at [address] [place] [address].

  

 SPECIMEN(S) RECEIVED:

A.4mm punch, skin right hip

 

 CLINICAL HISTORY:

Early pressure sore, frail patient with multiple problems, ER doc was

worried about melanoma

 

 PATIENT:  AMOR ACOSTA

/AGE:  1940 (Age: 77)  

PATIENT #:  [id number]

ACCESSION #:  [id number]          ALT CASE #:   

SPECIMEN COLLECTION DATE:  2017

SPECIMEN RECEIVED DATE:  2017

   

[facility] - [address] [place] [address] - PHONE: 

[phone]

* * *  END OF REPORT  * * *

## 2017-06-12 NOTE — PDOC
Subjective:


Subjective:


Epigastric pain, flank pain.





Objective:


Objective:


RN aware of no GI concerns.


Vital Signs:





 Vital Signs








  Date Time  Temp Pulse Resp B/P (MAP) Pulse Ox O2 Delivery O2 Flow Rate FiO2


 


6/12/17 12:07  63  115/63    


 


6/12/17 08:00      Nasal Cannula 2.0 


 


6/12/17 07:39   20     


 


6/12/17 07:00 97.7    85   





 97.7       











PE:





GEN: NAD, eating lunch


LUNGS: clear


HEART: S1S2


ABD: doesn't seem tender


NEURO/PSYCH: A & O 3





A/P:


GERD/dyspepsia


-on PPI





Epigastric pain





Alternating bowel habits


-h/o ?pancreatic insufficiency/chronic pancreatitis, collagenous colitis - 

diarrhea improved w/ pancreatic enzymes


-has Miralax PRN 





UTI





--


Continue same per GI.


DC per primary.











JAZMIN ASHFORD Jun 12, 2017 12:56

## 2017-06-12 NOTE — PDOC
GENERAL


General:


vss and afebrile. positive review of symptoms with major complaints being still 

little sob and bladder doesn't feel like it is emptying. clinically improved 

from chf with O2 needs back at baseline of 2L/NC. very weak and therapy 

recommending snu and will get eval for same. otherwise same.


Problems:  





VITAL SIGNS


Vital Signs:





Vital Signs








  Date Time  Temp Pulse Resp B/P (MAP) Pulse Ox O2 Delivery O2 Flow Rate FiO2


 


6/12/17 06:39     98 Nasal Cannula 4.0 


 


6/12/17 04:39  69  146/51 (82)    


 


6/12/17 03:00 97.5  20     





 97.5       











I & O


I & O











Intake and Output 


 


 6/12/17





 07:00


 


Intake Total 480 ml


 


Output Total 750 ml


 


Balance -270 ml


 


 


 


Intake Oral 480 ml


 


Output Urine Total 750 ml











ALLERGIES


Allergies:





Allergies








Coded Allergies Type Severity Reaction Last Updated Verified


 


  diphenhydramine Allergy Severe Shortness of Air 6/1/16 Yes


 


  NSAIDS (Non-Steroidal Anti-Inflamma Allergy Intermediate All except Feldene 1/

5/15 Yes


 


  Pentazocine Lactate Allergy Intermediate Blood pressure increase, chest pain, 

heart races, itching. 1/5/15 Yes


 


  Trimethobenzamide HCl Allergy Intermediate Severe muscle spasms 1/5/15 Yes


 


  acetaminophen Allergy Intermediate Shallow breathing, slow heart rate, 

stomach pain 1/5/15 Yes


 


  adhesive Allergy Intermediate rash--"will explain." 1/5/15 Yes


 


  butorphanol tartrate Allergy Intermediate Hallucinations, HA, anxiety, rash 1/

5/15 Yes


 


  carisoprodol Allergy Intermediate Fainting, fast heart rate, light-headed 1/5/

15 Yes


 


  chlorzoxazone Allergy Intermediate Nausea, dizzy, rash, feels like I might 

pass out 1/5/15 Yes


 


  codeine Allergy Intermediate Shallow breathing, vomiting, severe HA, 

bradycardia, itching 1/5/15 Yes


 


  codeine phosphate Allergy Intermediate Shallow breathing, slow heart rate, 

stomach pain 1/5/15 Yes


 


  cyclobenzaprine HCl Allergy Intermediate Chest pain,HA, fast heart rate, 

nervous 1/5/15 Yes


 


  erythromycin base Allergy Intermediate Fainting, fast/irreg HR, rash, 

itiching 1/5/15 Yes


 


  hydromorphone HCl Allergy Intermediate Apneic 1/5/15 Yes


 


  iodine Allergy Intermediate Itching, skin rash, hives 1/5/15 Yes


 


  ketorolac tromethamine Allergy Intermediate Chest pain, sudden severe HA 1/5/

15 Yes


 


  levofloxacin Allergy Intermediate Severe dizziness, heart races, itching, 

stomach pain 1/5/15 Yes


 


  midazolam HCl Allergy Intermediate Slow heart rate, affects bld pressure, 

mild rash 1/5/15 Yes


 


  morphine Allergy Intermediate apneic 1/5/15 Yes


 


  nalbuphine HCl Allergy Intermediate Tachycardia, confusion, hallucinations, 

nervous 1/5/15 Yes


 


  I S O L A T I O N *CONTACT* Allergy Unknown  12/8/16 Yes











MEDS


Medications:





Current Medications








 Medications


  (Trade)  Dose


 Ordered  Sig/Laura  Start Time


 Stop Time Status Last Admin


Dose Admin


 


 Amiodarone HCl


  (Cordarone)  200 mg  DAILY  6/3/17 09:00


    6/11/17 09:08


200 MG


 


 Amylase/Lipase/


 Protease


  (Zenpep 10,000)  2 cap  TIDWMEALS  6/5/17 12:00


    6/11/17 17:22


2 CAP


 


 Apixaban


  (Eliquis)  2.5 mg  BID  6/3/17 11:00


    6/11/17 20:56


2.5 MG


 


 Atenolol


  (Tenormin)  25 mg  BID  6/2/17 21:00


    6/11/17 09:06


25 MG


 


 Bisacodyl


  (Dulcolax Tab)  5 mg  PRN DAILY  PRN  6/8/17 13:30


    6/10/17 21:33


5 MG


 


 Cefazolin Sodium


 1 gm/Sodium


 Chloride  50 ml @ 


 100 mls/hr  BID66  6/10/17 18:00


 6/11/17 11:05 DC 6/11/17 05:57


100 MLS/HR


 


 Cephalexin HCl


  (Keflex)  500 mg  BID  6/11/17 21:00


    6/11/17 20:56


500 MG


 


 Diazepam


  (Valium)  10 mg  QID  6/5/17 17:00


    6/11/17 20:56


10 MG


 


 Ertapenem 1 gm/


 Sodium Chloride  50 ml @ 


 100 mls/hr  Q24H  6/9/17 09:00


 6/10/17 10:44 DC 6/9/17 09:47


100 MLS/HR


 


 Fentanyl


  (Duragesic 50mcg/


 Hr Patch)  1 patch  Q3DAYS  6/5/17 09:00


    6/11/17 09:07


1 PATCH


 


 Fentanyl Citrate


  (Fentanyl 2ml


 Vial)  50 mcg  PRN Q2HR  PRN  6/2/17 16:45


 6/3/17 16:44 DC  


 


 


 Furosemide


  (Lasix)  10 mg  BID92  6/10/17 11:00


    6/11/17 14:34


10 MG


 


 Gadobutrol


  (Gadavist)  7 mmol  1X  ONCE  6/7/17 11:00


 6/7/17 11:01 DC 6/7/17 10:48


7 MMOL


 


 Info


  (Anti-Coagulation


 Monitoring By


 Pharmacy)  1 each  PRN DAILY  PRN  6/3/17 10:45


    6/11/17 13:13


1 EACH


 


 Iohexol


  (Omnipaque 300


 Mg/ml)  50 ml  STK-MED ONCE  6/7/17 10:22


 6/7/17 10:23 DC  


 


 


 Isosorbide


 Mononitrate


  (Imdur)  30 mg  DAILY  6/3/17 09:00


    6/11/17 09:08


30 MG


 


 Lisinopril


  (Prinivil)  10 mg  DAILY  6/3/17 09:00


    6/11/17 09:07


10 MG


 


 Multi-Ingredient


 Mouthwash/Gargle


  (Magic Mouthwash)  10 ml  PRN QID  PRN  6/8/17 08:45


    6/8/17 21:36


10 ML


 


 Neomycin/


 Polymyxin/


 Bacitracin


  (Triple


 Antibiotic


 Ointment)  1 pkt  DAILY  6/8/17 09:00


    6/11/17 09:07


1 PKT


 


 Nitroglycerin


  (Nitrostat)  0.4 mg  PRN Q5MIN  PRN  6/2/17 20:30


     


 


 


 Nitroglycerin/


 Dextrose  250 ml @ 3


 mls/hr  1X  ONCE  6/2/17 16:15


 6/6/17 03:34 DC  


 


 


 Non-Formulary


 Medication  50 mg  TID  PRN  6/2/17 20:30


   UNV  


 


 


 Ondansetron HCl


  (Zofran Odt)  4 mg  PRN Q6HRS  PRN  6/2/17 20:30


    6/12/17 04:33


4 MG


 


 Ondansetron HCl


  (Zofran)  4 mg  PRN Q8HRS  PRN  6/2/17 16:45


 6/3/17 16:44 DC  


 


 


 Oxycodone/


 Acetaminophen


  (Percocet 10/325)  1 tab  PRN QID  PRN  6/2/17 20:30


    6/12/17 06:39


1 TAB


 


 Pantoprazole


 Sodium


  (Protonix)  40 mg  DAILYAC  6/5/17 16:30


    6/12/17 06:38


40 MG


 


 Polyethylene


 Glycol


  (miraLAX PACKET)  17 gm  PRN DAILY  PRN  6/8/17 13:30


     


 


 


 Potassium Chloride


  (Klor-Con)  10 meq  DAILY  6/10/17 11:00


    6/11/17 09:07


10 MEQ


 


 Regadenoson


  (Lexiscan)  0.4 mg  1X  ONCE  6/5/17 09:30


 6/5/17 09:31 DC 6/5/17 11:18


0.4 MG


 


 Rivaroxaban


  (Xarelto)  15 mg  DAILYWSUP  6/3/17 17:00


   UNV  


 


 


 Ropinirole HCl


  (Requip)  0.25 mg  QHS  6/2/17 21:00


    6/11/17 20:56


0.25 MG


 


 Sodium Chloride  1,000 ml @ 


 50 mls/hr  Q20H  6/2/17 20:30


 6/10/17 02:45 DC 6/9/17 09:40


50 MLS/HR


 


 Sodium Chloride


  (Normal Saline


 Flush)  10 ml  QSHIFT  PRN  6/2/17 16:15


     


 

















KRISTIN LINDSAY MD Jun 12, 2017 08:25

## 2017-06-13 VITALS — SYSTOLIC BLOOD PRESSURE: 132 MMHG | DIASTOLIC BLOOD PRESSURE: 45 MMHG

## 2017-06-13 VITALS — SYSTOLIC BLOOD PRESSURE: 146 MMHG | DIASTOLIC BLOOD PRESSURE: 36 MMHG

## 2017-06-13 VITALS — SYSTOLIC BLOOD PRESSURE: 138 MMHG | DIASTOLIC BLOOD PRESSURE: 53 MMHG

## 2017-06-13 LAB
ANION GAP SERPL CALC-SCNC: 4 MMOL/L (ref 6–14)
BUN SERPL-MCNC: 30 MG/DL (ref 7–20)
CALCIUM SERPL-MCNC: 8.4 MG/DL (ref 8.5–10.1)
CHLORIDE SERPL-SCNC: 110 MMOL/L (ref 98–107)
CO2 SERPL-SCNC: 29 MMOL/L (ref 21–32)
CREAT SERPL-MCNC: 1.5 MG/DL (ref 0.6–1)
GFR SERPLBLD BASED ON 1.73 SQ M-ARVRAT: 33.5 ML/MIN
GLUCOSE SERPL-MCNC: 87 MG/DL (ref 70–99)
POTASSIUM SERPL-SCNC: 4.6 MMOL/L (ref 3.5–5.1)
SODIUM SERPL-SCNC: 143 MMOL/L (ref 136–145)

## 2017-06-13 RX ADMIN — OXYCODONE HYDROCHLORIDE AND ACETAMINOPHEN PRN TAB: 10; 325 TABLET ORAL at 11:59

## 2017-06-13 RX ADMIN — PANTOPRAZOLE SODIUM SCH MG: 40 TABLET, DELAYED RELEASE ORAL at 07:32

## 2017-06-13 RX ADMIN — POTASSIUM CHLORIDE SCH MEQ: 750 TABLET, FILM COATED, EXTENDED RELEASE ORAL at 09:01

## 2017-06-13 RX ADMIN — AMIODARONE HYDROCHLORIDE SCH MG: 200 TABLET ORAL at 09:02

## 2017-06-13 RX ADMIN — APIXABAN SCH MG: 2.5 TABLET, FILM COATED ORAL at 09:03

## 2017-06-13 RX ADMIN — PANCRELIPASE LIPASE, PANCRELIPASE PROTEASE, PANCRELIPASE AMYLASE SCH CAP: 10000; 32000; 42000 CAPSULE, DELAYED RELEASE ORAL at 07:32

## 2017-06-13 RX ADMIN — Medication PRN EACH: at 12:24

## 2017-06-13 RX ADMIN — BACITRACIN ZINC, NEOMYCIN, POLYMYXIN B SCH PKT: 400; 3.5; 5 OINTMENT TOPICAL at 09:01

## 2017-06-13 RX ADMIN — ATENOLOL SCH MG: 25 TABLET ORAL at 09:03

## 2017-06-13 RX ADMIN — ONDANSETRON PRN MG: 4 TABLET, ORALLY DISINTEGRATING ORAL at 05:18

## 2017-06-13 RX ADMIN — ISOSORBIDE MONONITRATE SCH MG: 30 TABLET, EXTENDED RELEASE ORAL at 09:03

## 2017-06-13 RX ADMIN — PANCRELIPASE LIPASE, PANCRELIPASE PROTEASE, PANCRELIPASE AMYLASE SCH CAP: 10000; 32000; 42000 CAPSULE, DELAYED RELEASE ORAL at 11:58

## 2017-06-13 RX ADMIN — OXYCODONE HYDROCHLORIDE AND ACETAMINOPHEN PRN TAB: 10; 325 TABLET ORAL at 05:19

## 2017-06-13 RX ADMIN — CEPHALEXIN SCH MG: 250 CAPSULE ORAL at 09:01

## 2017-06-13 NOTE — PDOC
GENERAL


General:


see discharge summary.


Problems:  





VITAL SIGNS


Vital Signs:





Vital Signs








  Date Time  Temp Pulse Resp B/P (MAP) Pulse Ox O2 Delivery O2 Flow Rate FiO2


 


6/13/17 07:56      Nasal Cannula 3.0 


 


6/13/17 06:19   18  94   


 


6/13/17 02:41 97.9 63  146/36 (72)    





 97.9       











I & O


I & O











Intake and Output 


 


 6/13/17





 07:00


 


Intake Total 480 ml


 


Output Total 500 ml


 


Balance -20 ml


 


 


 


Intake Oral 480 ml


 


Output Urine Total 500 ml


 


# Voids 3


 


# Bowel Movements 2











ALLERGIES


Allergies:





Allergies








Coded Allergies Type Severity Reaction Last Updated Verified


 


  diphenhydramine Allergy Severe Shortness of Air 6/1/16 Yes


 


  NSAIDS (Non-Steroidal Anti-Inflamma Allergy Intermediate All except Feldene 1/

5/15 Yes


 


  Pentazocine Lactate Allergy Intermediate Blood pressure increase, chest pain, 

heart races, itching. 1/5/15 Yes


 


  Trimethobenzamide HCl Allergy Intermediate Severe muscle spasms 1/5/15 Yes


 


  acetaminophen Allergy Intermediate Shallow breathing, slow heart rate, 

stomach pain 1/5/15 Yes


 


  adhesive Allergy Intermediate rash--"will explain." 1/5/15 Yes


 


  butorphanol tartrate Allergy Intermediate Hallucinations, HA, anxiety, rash 1/

5/15 Yes


 


  carisoprodol Allergy Intermediate Fainting, fast heart rate, light-headed 1/5/

15 Yes


 


  chlorzoxazone Allergy Intermediate Nausea, dizzy, rash, feels like I might 

pass out 1/5/15 Yes


 


  codeine Allergy Intermediate Shallow breathing, vomiting, severe HA, 

bradycardia, itching 1/5/15 Yes


 


  codeine phosphate Allergy Intermediate Shallow breathing, slow heart rate, 

stomach pain 1/5/15 Yes


 


  cyclobenzaprine HCl Allergy Intermediate Chest pain,HA, fast heart rate, 

nervous 1/5/15 Yes


 


  erythromycin base Allergy Intermediate Fainting, fast/irreg HR, rash, 

itiching 1/5/15 Yes


 


  hydromorphone HCl Allergy Intermediate Apneic 1/5/15 Yes


 


  iodine Allergy Intermediate Itching, skin rash, hives 1/5/15 Yes


 


  ketorolac tromethamine Allergy Intermediate Chest pain, sudden severe HA 1/5/

15 Yes


 


  levofloxacin Allergy Intermediate Severe dizziness, heart races, itching, 

stomach pain 1/5/15 Yes


 


  midazolam HCl Allergy Intermediate Slow heart rate, affects bld pressure, 

mild rash 1/5/15 Yes


 


  morphine Allergy Intermediate apneic 1/5/15 Yes


 


  nalbuphine HCl Allergy Intermediate Tachycardia, confusion, hallucinations, 

nervous 1/5/15 Yes


 


  I S O L A T I O N *CONTACT* Allergy Unknown  12/8/16 Yes











MEDS


Medications:





Current Medications








 Medications


  (Trade)  Dose


 Ordered  Sig/Laura  Start Time


 Stop Time Status Last Admin


Dose Admin


 


 Amiodarone HCl


  (Cordarone)  200 mg  DAILY  6/3/17 09:00


    6/12/17 12:07


200 MG


 


 Amylase/Lipase/


 Protease


  (Zenpep 10,000)  2 cap  TIDWMEALS  6/5/17 12:00


    6/13/17 07:32


2 CAP


 


 Apixaban


  (Eliquis)  2.5 mg  BID  6/3/17 11:00


    6/12/17 22:14


2.5 MG


 


 Atenolol


  (Tenormin)  25 mg  BID  6/2/17 21:00


    6/12/17 23:01


25 MG


 


 Bisacodyl


  (Dulcolax Tab)  5 mg  PRN DAILY  PRN  6/8/17 13:30


    6/10/17 21:33


5 MG


 


 Cefazolin Sodium


 1 gm/Sodium


 Chloride  50 ml @ 


 100 mls/hr  BID66  6/10/17 18:00


 6/11/17 11:05 DC 6/11/17 05:57


100 MLS/HR


 


 Cephalexin HCl


  (Keflex)  500 mg  BID  6/11/17 21:00


    6/12/17 22:14


500 MG


 


 Diazepam


  (Valium)  10 mg  QID  6/5/17 17:00


    6/13/17 07:32


10 MG


 


 Ertapenem 1 gm/


 Sodium Chloride  50 ml @ 


 100 mls/hr  Q24H  6/9/17 09:00


 6/10/17 10:44 DC 6/9/17 09:47


100 MLS/HR


 


 Fentanyl


  (Duragesic 50mcg/


 Hr Patch)  1 patch  Q3DAYS  6/5/17 09:00


    6/11/17 09:07


1 PATCH


 


 Fentanyl Citrate


  (Fentanyl 2ml


 Vial)  50 mcg  PRN Q2HR  PRN  6/2/17 16:45


 6/3/17 16:44 DC  


 


 


 Furosemide


  (Lasix)  10 mg  BID92  6/10/17 11:00


 6/12/17 11:18 DC 6/11/17 14:34


10 MG


 


 Gadobutrol


  (Gadavist)  7 mmol  1X  ONCE  6/7/17 11:00


 6/7/17 11:01 DC 6/7/17 10:48


7 MMOL


 


 Info


  (Anti-Coagulation


 Monitoring By


 Pharmacy)  1 each  PRN DAILY  PRN  6/3/17 10:45


    6/12/17 12:19


1 EACH


 


 Iohexol


  (Omnipaque 300


 Mg/ml)  50 ml  STK-MED ONCE  6/7/17 10:22


 6/7/17 10:23 DC  


 


 


 Isosorbide


 Mononitrate


  (Imdur)  30 mg  DAILY  6/3/17 09:00


    6/12/17 12:07


30 MG


 


 Lisinopril


  (Prinivil)  10 mg  DAILY  6/3/17 09:00


 6/12/17 11:18 DC 6/11/17 09:07


10 MG


 


 Multi-Ingredient


 Mouthwash/Gargle


  (Magic Mouthwash)  10 ml  PRN QID  PRN  6/8/17 08:45


    6/8/17 21:36


10 ML


 


 Neomycin/


 Polymyxin/


 Bacitracin


  (Triple


 Antibiotic


 Ointment)  1 pkt  DAILY  6/8/17 09:00


    6/12/17 08:46


1 PKT


 


 Nitroglycerin


  (Nitrostat)  0.4 mg  PRN Q5MIN  PRN  6/2/17 20:30


     


 


 


 Nitroglycerin/


 Dextrose  250 ml @ 3


 mls/hr  1X  ONCE  6/2/17 16:15


 6/6/17 03:34 DC  


 


 


 Non-Formulary


 Medication  50 mg  TID  PRN  6/2/17 20:30


   UNV  


 


 


 Ondansetron HCl


  (Zofran Odt)  4 mg  PRN Q6HRS  PRN  6/2/17 20:30


    6/13/17 05:18


4 MG


 


 Ondansetron HCl


  (Zofran)  4 mg  PRN Q8HRS  PRN  6/2/17 16:45


 6/3/17 16:44 DC  


 


 


 Oxycodone/


 Acetaminophen


  (Percocet 10/325)  1 tab  PRN QID  PRN  6/2/17 20:30


    6/13/17 05:19


1 TAB


 


 Pantoprazole


 Sodium


  (Protonix)  40 mg  DAILYAC  6/5/17 16:30


    6/13/17 07:32


40 MG


 


 Polyethylene


 Glycol


  (miraLAX PACKET)  17 gm  PRN DAILY  PRN  6/8/17 13:30


     


 


 


 Potassium Chloride


  (Klor-Con)  10 meq  DAILY  6/10/17 11:00


    6/12/17 08:47


10 MEQ


 


 Regadenoson


  (Lexiscan)  0.4 mg  1X  ONCE  6/5/17 09:30


 6/5/17 09:31 DC 6/5/17 11:18


0.4 MG


 


 Rivaroxaban


  (Xarelto)  15 mg  DAILYWSUP  6/3/17 17:00


   UNV  


 


 


 Ropinirole HCl


  (Requip)  0.25 mg  QHS  6/2/17 21:00


    6/12/17 22:13


0.25 MG


 


 Sodium Chloride  1,000 ml @ 


 50 mls/hr  Q20H  6/2/17 20:30


 6/10/17 02:45 DC 6/9/17 09:40


50 MLS/HR


 


 Sodium Chloride


  (Normal Saline


 Flush)  10 ml  QSHIFT  PRN  6/2/17 16:15


     


 











LAB


Lab:





Laboratory Tests








Test


  6/13/17


05:00


 


Sodium Level


  143 mmol/L


(136-145)


 


Potassium Level


  4.6 mmol/L


(3.5-5.1)


 


Chloride Level


  110 mmol/L


()


 


Carbon Dioxide Level


  29 mmol/L


(21-32)


 


Anion Gap 4 (6-14) 


 


Blood Urea Nitrogen


  30 mg/dL


(7-20)


 


Creatinine


  1.5 mg/dL


(0.6-1.0)


 


Estimated GFR


(Cockcroft-Gault) 33.5 


 


 


Glucose Level


  87 mg/dL


(70-99)


 


Calcium Level


  8.4 mg/dL


(8.5-10.1)

















KRISTIN LINDSAY MD Jun 13, 2017 08:33

## 2017-06-13 NOTE — PDOC
Subjective:


Subjective:


Offers no GI complaints.





Objective:


Objective:


Reviewed SW note - DC today [facility] today.


No GI concerns per RN.


Vital Signs:





 Vital Signs








  Date Time  Temp Pulse Resp B/P (MAP) Pulse Ox O2 Delivery O2 Flow Rate FiO2


 


6/13/17 11:59   18   Nasal Cannula  


 


6/13/17 09:03  60  132/45    


 


6/13/17 07:56       3.0 


 


6/13/17 07:00 97.5    95   





 97.5       








Labs:





Laboratory Tests








Test


  6/13/17


05:00


 


Sodium Level 143 mmol/L 


 


Potassium Level 4.6 mmol/L 


 


Chloride Level 110 mmol/L 


 


Carbon Dioxide Level 29 mmol/L 


 


Anion Gap 4 


 


Blood Urea Nitrogen 30 mg/dL 


 


Creatinine 1.5 mg/dL 


 


Estimated GFR


(Cockcroft-Gault) 33.5 


 


 


Glucose Level 87 mg/dL 


 


Calcium Level 8.4 mg/dL 











PE:





GEN: NAD, sitting up in bed


LUNGS: clear anteriorly


HEART: S1S2


ABD: epigastric tenderness as usual


NEURO/PSYCH: stable, awake/alert





A/P:


GERD/dyspepsia, epigastric pain, alternating bowel habits


-?h/o chronic pancreatitis/insufficiency, collagenous colitis


-on PPI, pancreatic enzymes, Miralax PRN





--


DC plans noted, continue present medications.











JAZMIN ASHFORD Jun 13, 2017 12:10

## 2017-06-14 NOTE — DS
DATE OF DISCHARGE:  06/13/2017



PRIMARY DIAGNOSIS:  Acute renal failure.



ADDITIONAL DIAGNOSES:  Chest pain, felt to be noncardiac, with normal stress MPI

during the stay, urinary tract infection, right hip ulcer with biopsy diagnosis

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

heart failure, likely diastolic with normal ejection fraction, chronic

obstructive pulmonary disease, fall x 2 during the stay with weakness.



CHIEF COMPLAINT AND HISTORY OF PRESENT ILLNESS:  This 77-year-old white female

was getting an MRI of her right hip because of a nonhealing sore in that area to

rule out any underlying bone involvement when she developed chest pain,

shortness of breath, nausea that was not relieved by nitroglycerin.  She was

sent to the hospital where she was admitted for the same with also the findings

of a BUN of 160 and a creatinine of 4.8.



SUMMARY OF STAY:  The patient was admitted.  Cardiology, Renal, Dermatology, GI,

all saw the patient during the stay.  Her kidney failure resolved with hydration

during the stay.  She initially had an elevated lipase, thinking she may have

some pancreatitis causing her chest pain, but this came down quickly and GI felt

this was factitiously elevated due to the renal failure and did not feel she had

pancreatitis at all.  Diet was eventually advanced, which she ate, although she

developed periods of nausea and vomiting during the stay requiring ____.  She

developed a urinary tract infection during the stay and this was being treated

at the time of discharge.  Dermatology biopsied her right hip lesion showing

that as mentioned above and her hip did improve during the stay dramatically,

likely related to the mattress on the bed, which was much softer than she has at

home.  She was quite weak and determined by PT after the falls that she should

have skilled nursing at the time of discharge for therapy and this was

accomplished.



DISPOSITION:  The patient is discharged to skilled nursing, regular diet,

activity with help.  We will continue to follow her there.



DISCHARGE MEDICATIONS:  Have been ____ on the med rec and have been addressed.

 



______________________________

KRISTIN LINDSAY MD



DR:  MATTHEW/kendall  JOB#:  [id number] / [id number]

DD:  06/13/2017 21:17  DT:  06/14/2017 03:38

## 2017-06-30 VITALS
SYSTOLIC BLOOD PRESSURE: 133 MMHG | DIASTOLIC BLOOD PRESSURE: 54 MMHG | SYSTOLIC BLOOD PRESSURE: 133 MMHG | DIASTOLIC BLOOD PRESSURE: 54 MMHG

## 2017-08-24 ENCOUNTER — HOSPITAL ENCOUNTER (OUTPATIENT)
Dept: HOSPITAL 61 - CT | Age: 77
Discharge: HOME | End: 2017-08-24
Attending: FAMILY MEDICINE
Payer: MEDICARE

## 2017-08-24 DIAGNOSIS — Z90.49: ICD-10-CM

## 2017-08-24 DIAGNOSIS — N20.0: Primary | ICD-10-CM

## 2017-08-24 PROCEDURE — 74176 CT ABD & PELVIS W/O CONTRAST: CPT

## 2017-08-24 NOTE — RAD
CT of the abdomen and pelvis without contrast, 8/24/2017:



History: Right flank pain



Noncontrast scans were obtained as requested. This a limited study for

evaluation of the possibility of urinary tract calculi.



There are single small bilateral intrarenal calculi in the upper poles of both

kidneys. There is no evidence of hydronephrosis. The ureters cannot be clearly

traced through the pelvis in this patient. No ureteral calculus is identified.



There is extensive calcific plaquing of the aorta and its branches. There is

slight dilatation of the infrarenal abdominal aorta. Calcifications extend

into the lumen of the aorta near the bifurcation compatible with an old

dissection.



There are emphysematous changes in both lungs with scattered parenchymal

scars.



The unopacified liver is unremarkable. The gallbladder is surgically absent.

The pancreas cannot be clearly  from unopacified bowel. The spleen is

unremarkable.



The uterus is surgically absent. The bowel loops are not dilated. The cecum is

low-lying in the pelvis. The appendix is not visualized. No dilated appendix

is identified. No free air or significant free fluid is evident in the abdomen

or pelvis.



Moderately severe multilevel degenerative changes are present in the spine.





IMPRESSION:

1. Single small bilateral nonobstructing intrarenal calculi.

2. No obstructing urinary tract calculus is identified.

3. Miscellaneous chronic findings as described above.















PQRS Compliance Statement:



One or more of the following individualized dose reduction techniques were

utilized for this examination:

1. Automated exposure control

2. Adjustment of the mA and/or kV according to patient size

3. Use of iterative reconstruction technique

## 2017-10-20 ENCOUNTER — HOSPITAL ENCOUNTER (OUTPATIENT)
Dept: HOSPITAL 61 - INTRAD | Age: 77
Discharge: HOME | End: 2017-10-20
Attending: FAMILY MEDICINE
Payer: MEDICARE

## 2017-10-20 DIAGNOSIS — J44.9: ICD-10-CM

## 2017-10-20 DIAGNOSIS — Z88.8: ICD-10-CM

## 2017-10-20 DIAGNOSIS — I25.10: ICD-10-CM

## 2017-10-20 DIAGNOSIS — K21.9: ICD-10-CM

## 2017-10-20 DIAGNOSIS — Z45.2: Primary | ICD-10-CM

## 2017-10-20 DIAGNOSIS — Z96.652: ICD-10-CM

## 2017-10-20 DIAGNOSIS — Z90.49: ICD-10-CM

## 2017-10-20 DIAGNOSIS — F41.9: ICD-10-CM

## 2017-10-20 DIAGNOSIS — N18.9: ICD-10-CM

## 2017-10-20 DIAGNOSIS — F17.200: ICD-10-CM

## 2017-10-20 DIAGNOSIS — E11.22: ICD-10-CM

## 2017-10-20 DIAGNOSIS — Z91.048: ICD-10-CM

## 2017-10-20 DIAGNOSIS — Z87.440: ICD-10-CM

## 2017-10-20 DIAGNOSIS — Z90.710: ICD-10-CM

## 2017-10-20 DIAGNOSIS — I50.9: ICD-10-CM

## 2017-10-20 DIAGNOSIS — Z87.39: ICD-10-CM

## 2017-10-20 DIAGNOSIS — M19.91: ICD-10-CM

## 2017-10-20 DIAGNOSIS — Z88.6: ICD-10-CM

## 2017-10-20 DIAGNOSIS — I13.0: ICD-10-CM

## 2017-10-20 PROCEDURE — 36598 INJ W/FLUOR EVAL CV DEVICE: CPT

## 2018-01-27 ENCOUNTER — HOSPITAL ENCOUNTER (INPATIENT)
Dept: HOSPITAL 61 - ER | Age: 78
LOS: 10 days | Discharge: NURSING, REHAB OR LONG-TERM CARE | DRG: 871 | End: 2018-02-06
Attending: FAMILY MEDICINE | Admitting: FAMILY MEDICINE
Payer: MEDICARE

## 2018-01-27 DIAGNOSIS — K21.9: ICD-10-CM

## 2018-01-27 DIAGNOSIS — Z91.048: ICD-10-CM

## 2018-01-27 DIAGNOSIS — Z88.5: ICD-10-CM

## 2018-01-27 DIAGNOSIS — E43: ICD-10-CM

## 2018-01-27 DIAGNOSIS — J44.0: ICD-10-CM

## 2018-01-27 DIAGNOSIS — I25.10: ICD-10-CM

## 2018-01-27 DIAGNOSIS — G90.50: ICD-10-CM

## 2018-01-27 DIAGNOSIS — Z88.1: ICD-10-CM

## 2018-01-27 DIAGNOSIS — Z90.710: ICD-10-CM

## 2018-01-27 DIAGNOSIS — K58.9: ICD-10-CM

## 2018-01-27 DIAGNOSIS — I50.9: ICD-10-CM

## 2018-01-27 DIAGNOSIS — E87.0: ICD-10-CM

## 2018-01-27 DIAGNOSIS — N17.9: ICD-10-CM

## 2018-01-27 DIAGNOSIS — Z87.891: ICD-10-CM

## 2018-01-27 DIAGNOSIS — J96.21: ICD-10-CM

## 2018-01-27 DIAGNOSIS — E78.5: ICD-10-CM

## 2018-01-27 DIAGNOSIS — I13.0: ICD-10-CM

## 2018-01-27 DIAGNOSIS — F03.90: ICD-10-CM

## 2018-01-27 DIAGNOSIS — J44.1: ICD-10-CM

## 2018-01-27 DIAGNOSIS — I73.9: ICD-10-CM

## 2018-01-27 DIAGNOSIS — Z91.041: ICD-10-CM

## 2018-01-27 DIAGNOSIS — I25.2: ICD-10-CM

## 2018-01-27 DIAGNOSIS — Z82.49: ICD-10-CM

## 2018-01-27 DIAGNOSIS — F41.9: ICD-10-CM

## 2018-01-27 DIAGNOSIS — Z88.8: ICD-10-CM

## 2018-01-27 DIAGNOSIS — N18.3: ICD-10-CM

## 2018-01-27 DIAGNOSIS — A41.9: Primary | ICD-10-CM

## 2018-01-27 DIAGNOSIS — Z96.653: ICD-10-CM

## 2018-01-27 DIAGNOSIS — Z95.5: ICD-10-CM

## 2018-01-27 DIAGNOSIS — M19.90: ICD-10-CM

## 2018-01-27 DIAGNOSIS — Z90.49: ICD-10-CM

## 2018-01-27 DIAGNOSIS — E86.0: ICD-10-CM

## 2018-01-27 DIAGNOSIS — A04.72: ICD-10-CM

## 2018-01-27 DIAGNOSIS — J18.9: ICD-10-CM

## 2018-01-27 DIAGNOSIS — I42.0: ICD-10-CM

## 2018-01-27 LAB
% BANDS: 6 % (ref 0–9)
% LYMPHS: 1 % (ref 24–48)
% MONOS: 7 % (ref 0–10)
% SEGS: 86 % (ref 35–66)
ADD MAN DIFF?: YES
ALBUMIN SERPL-MCNC: 2.2 G/DL (ref 3.4–5)
ALBUMIN/GLOB SERPL: 0.5 {RATIO} (ref 1–1.7)
ALP SERPL-CCNC: 100 U/L (ref 46–116)
ALT (SGPT): 55 U/L (ref 14–59)
ANION GAP SERPL CALC-SCNC: 17 MMOL/L (ref 6–14)
AST SERPL-CCNC: 38 U/L (ref 15–37)
BASE EXCESS ABG: -8 MMOL/L (ref -3–3)
BASO #: 0 X10^3/UL (ref 0–0.2)
BASO %: 0 % (ref 0–3)
BLOOD UREA NITROGEN: 81 MG/DL (ref 7–20)
BUN/CREAT SERPL: 35 (ref 6–20)
CALCIUM: 7.9 MG/DL (ref 8.5–10.1)
CHLORIDE: 119 MMOL/L (ref 98–107)
CO2 SERPL-SCNC: 16 MMOL/L (ref 21–32)
CREAT SERPL-MCNC: 2.3 MG/DL (ref 0.6–1)
EOS #: 0 X10^3/UL (ref 0–0.7)
EOS %: 0 % (ref 0–3)
FIO2 ABG: 21
GFR SERPLBLD BASED ON 1.73 SQ M-ARVRAT: 20.4 ML/MIN
GLOBULIN SER-MCNC: 4.6 G/DL (ref 2.2–3.8)
GLUCOSE SERPL-MCNC: 116 MG/DL (ref 70–99)
HCG SERPL-ACNC: 22.2 X10^3/UL (ref 4–11)
HCO3 ABG: 16 MMOL/L (ref 21–28)
HEMATOCRIT: 44.6 % (ref 36–47)
HEMOGLOBIN: 14.8 G/DL (ref 12–15.5)
INFLUENZA A PATIENT: NEGATIVE
INFLUENZA B PATIENT: NEGATIVE
LACTIC ACID: 1.2 MMOL/L (ref 0.4–2)
LYMPH #: 0.3 X10^3/UL (ref 1–4.8)
LYMPH %: 1 % (ref 24–48)
MEAN CORPUSCULAR HEMOGLOBIN: 33 PG (ref 25–35)
MEAN CORPUSCULAR HGB CONC: 33 G/DL (ref 31–37)
MEAN CORPUSCULAR VOLUME: 98 FL (ref 79–100)
MONO #: 2 X10^3/UL (ref 0–1.1)
MONO %: 9 % (ref 0–9)
NEUT #: 19.8 X10^3UL (ref 1.8–7.7)
NEUT %: 89 % (ref 31–73)
NT-PRO BNP: (no result) PG/ML (ref 0–449)
OBC FLU: (no result)
PCO2 ABG: 27 MMHG (ref 35–46)
PH ABG: 7.38 (ref 7.35–7.45)
PLATELET COUNT: 273 X10^3/UL (ref 140–400)
PLT ESTIMATE: ADEQUATE
PO2 ABG: 68 MMHG (ref 65–108)
POTASSIUM SERPL-SCNC: 3.6 MMOL/L (ref 3.5–5.1)
RED BLOOD COUNT: 4.57 X10^6/UL (ref 3.5–5.4)
RED CELL DISTRIBUTION WIDTH: 15 % (ref 11.5–14.5)
SAT O2 ABG: 92 % (ref 92–99)
SODIUM: 152 MMOL/L (ref 136–145)
TOTAL BILIRUBIN: 0.6 MG/DL (ref 0.2–1)
TOTAL PROTEIN: 6.8 G/DL (ref 6.4–8.2)
TROPONINI: 0.12 NG/ML (ref 0–0.06)

## 2018-01-27 PROCEDURE — 85025 COMPLETE CBC W/AUTO DIFF WBC: CPT

## 2018-01-27 PROCEDURE — 87324 CLOSTRIDIUM AG IA: CPT

## 2018-01-27 PROCEDURE — 85007 BL SMEAR W/DIFF WBC COUNT: CPT

## 2018-01-27 PROCEDURE — 97163 PT EVAL HIGH COMPLEX 45 MIN: CPT

## 2018-01-27 PROCEDURE — 87641 MR-STAPH DNA AMP PROBE: CPT

## 2018-01-27 PROCEDURE — 71045 X-RAY EXAM CHEST 1 VIEW: CPT

## 2018-01-27 PROCEDURE — 80048 BASIC METABOLIC PNL TOTAL CA: CPT

## 2018-01-27 PROCEDURE — 87086 URINE CULTURE/COLONY COUNT: CPT

## 2018-01-27 PROCEDURE — 83880 ASSAY OF NATRIURETIC PEPTIDE: CPT

## 2018-01-27 PROCEDURE — 96361 HYDRATE IV INFUSION ADD-ON: CPT

## 2018-01-27 PROCEDURE — 93005 ELECTROCARDIOGRAM TRACING: CPT

## 2018-01-27 PROCEDURE — 84100 ASSAY OF PHOSPHORUS: CPT

## 2018-01-27 PROCEDURE — 80053 COMPREHEN METABOLIC PANEL: CPT

## 2018-01-27 PROCEDURE — 97535 SELF CARE MNGMENT TRAINING: CPT

## 2018-01-27 PROCEDURE — 96365 THER/PROPH/DIAG IV INF INIT: CPT

## 2018-01-27 PROCEDURE — 84484 ASSAY OF TROPONIN QUANT: CPT

## 2018-01-27 PROCEDURE — 87804 INFLUENZA ASSAY W/OPTIC: CPT

## 2018-01-27 PROCEDURE — 83735 ASSAY OF MAGNESIUM: CPT

## 2018-01-27 PROCEDURE — 87449 NOS EACH ORGANISM AG IA: CPT

## 2018-01-27 PROCEDURE — 85027 COMPLETE CBC AUTOMATED: CPT

## 2018-01-27 PROCEDURE — 92610 EVALUATE SWALLOWING FUNCTION: CPT

## 2018-01-27 PROCEDURE — 36600 WITHDRAWAL OF ARTERIAL BLOOD: CPT

## 2018-01-27 PROCEDURE — 81001 URINALYSIS AUTO W/SCOPE: CPT

## 2018-01-27 PROCEDURE — 97166 OT EVAL MOD COMPLEX 45 MIN: CPT

## 2018-01-27 PROCEDURE — 99291 CRITICAL CARE FIRST HOUR: CPT

## 2018-01-27 PROCEDURE — 96368 THER/DIAG CONCURRENT INF: CPT

## 2018-01-27 PROCEDURE — 83605 ASSAY OF LACTIC ACID: CPT

## 2018-01-27 PROCEDURE — 87040 BLOOD CULTURE FOR BACTERIA: CPT

## 2018-01-27 PROCEDURE — 97530 THERAPEUTIC ACTIVITIES: CPT

## 2018-01-27 PROCEDURE — 82805 BLOOD GASES W/O2 SATURATION: CPT

## 2018-01-27 PROCEDURE — 36415 COLL VENOUS BLD VENIPUNCTURE: CPT

## 2018-01-27 RX ADMIN — BACITRACIN 1 MLS/HR: 5000 INJECTION, POWDER, FOR SOLUTION INTRAMUSCULAR at 07:23

## 2018-01-27 RX ADMIN — VANCOMYCIN HYDROCHLORIDE 1 MLS/HR: 1 INJECTION, POWDER, FOR SOLUTION INTRAVENOUS at 09:22

## 2018-01-27 RX ADMIN — VANCOMYCIN HYDROCHLORIDE 1 EACH: 1 INJECTION, POWDER, LYOPHILIZED, FOR SOLUTION INTRAVENOUS at 10:51

## 2018-01-27 RX ADMIN — PANCRELIPASE LIPASE, PANCRELIPASE PROTEASE, PANCRELIPASE AMYLASE 1 CAP: 10000; 32000; 42000 CAPSULE, DELAYED RELEASE ORAL at 12:30

## 2018-01-27 RX ADMIN — PIPERACILLIN SODIUM AND TAZOBACTAM SODIUM 1 MLS/HR: 2; .25 INJECTION, POWDER, LYOPHILIZED, FOR SOLUTION INTRAVENOUS at 13:00

## 2018-01-27 RX ADMIN — ISOSORBIDE MONONITRATE 1 MG: 30 TABLET, EXTENDED RELEASE ORAL at 12:51

## 2018-01-27 RX ADMIN — PANTOPRAZOLE SODIUM 1 MG: 40 TABLET, DELAYED RELEASE ORAL at 12:30

## 2018-01-27 RX ADMIN — PIPERACILLIN SODIUM AND TAZOBACTAM SODIUM 1 MLS/HR: 3; .375 INJECTION, POWDER, LYOPHILIZED, FOR SOLUTION INTRAVENOUS at 08:48

## 2018-01-27 RX ADMIN — SODIUM BICARBONATE 1 MLS/HR: 84 INJECTION, SOLUTION INTRAVENOUS at 15:41

## 2018-01-27 RX ADMIN — PANCRELIPASE LIPASE, PANCRELIPASE PROTEASE, PANCRELIPASE AMYLASE 1 CAP: 10000; 32000; 42000 CAPSULE, DELAYED RELEASE ORAL at 17:53

## 2018-01-27 RX ADMIN — ASPIRIN 1 MG: 300 SUPPOSITORY RECTAL at 09:36

## 2018-01-27 RX ADMIN — MEROPENEM 1 MG: 500 INJECTION, POWDER, FOR SOLUTION INTRAVENOUS at 21:52

## 2018-01-28 LAB
ADD MAN DIFF?: NO
ALBUMIN SERPL-MCNC: 2.1 G/DL (ref 3.4–5)
ALBUMIN/GLOB SERPL: 0.5 {RATIO} (ref 1–1.7)
ALP SERPL-CCNC: 92 U/L (ref 46–116)
ALT (SGPT): 44 U/L (ref 14–59)
ANION GAP SERPL CALC-SCNC: 13 MMOL/L (ref 6–14)
AST SERPL-CCNC: 32 U/L (ref 15–37)
BACTERIA,URINE: 0 /HPF
BASO #: 0 X10^3/UL (ref 0–0.2)
BASO %: 0 % (ref 0–3)
BILIRUBIN,URINE: NEGATIVE
BLOOD UREA NITROGEN: 66 MG/DL (ref 7–20)
BUN/CREAT SERPL: 33 (ref 6–20)
CALCIUM: 8 MG/DL (ref 8.5–10.1)
CHLORIDE: 109 MMOL/L (ref 98–107)
CLARITY,URINE: CLEAR
CO2 SERPL-SCNC: 20 MMOL/L (ref 21–32)
COLOR,URINE: YELLOW
CREAT SERPL-MCNC: 2 MG/DL (ref 0.6–1)
EOS #: 0 X10^3/UL (ref 0–0.7)
EOS %: 0 % (ref 0–3)
GFR SERPLBLD BASED ON 1.73 SQ M-ARVRAT: 24 ML/MIN
GLOBULIN SER-MCNC: 4.4 G/DL (ref 2.2–3.8)
GLUCOSE SERPL-MCNC: 112 MG/DL (ref 70–99)
GLUCOSE,URINE: NEGATIVE MG/DL
HCG SERPL-ACNC: 21 X10^3/UL (ref 4–11)
HEMATOCRIT: 42.8 % (ref 36–47)
HEMOGLOBIN: 13.9 G/DL (ref 12–15.5)
LYMPH #: 0.3 X10^3/UL (ref 1–4.8)
LYMPH %: 1 % (ref 24–48)
MEAN CORPUSCULAR HEMOGLOBIN: 32 PG (ref 25–35)
MEAN CORPUSCULAR HGB CONC: 33 G/DL (ref 31–37)
MEAN CORPUSCULAR VOLUME: 97 FL (ref 79–100)
MONO #: 1.6 X10^3/UL (ref 0–1.1)
MONO %: 8 % (ref 0–9)
NEUT #: 19.1 X10^3UL (ref 1.8–7.7)
NEUT %: 91 % (ref 31–73)
NITRITE,URINE: NEGATIVE
PH,URINE: 5.5
PLATELET COUNT: 216 X10^3/UL (ref 140–400)
POTASSIUM SERPL-SCNC: 3.3 MMOL/L (ref 3.5–5.1)
PROTEIN,URINE: 30 MG/DL
RBC,URINE: 0 /HPF (ref 0–2)
RED BLOOD COUNT: 4.41 X10^6/UL (ref 3.5–5.4)
RED CELL DISTRIBUTION WIDTH: 14.6 % (ref 11.5–14.5)
SODIUM: 142 MMOL/L (ref 136–145)
SPECIFIC GRAVITY,URINE: 1.02
SQUAMOUS EPITHELIAL CELL,UR: (no result) /LPF
TOTAL BILIRUBIN: 0.7 MG/DL (ref 0.2–1)
TOTAL PROTEIN: 6.5 G/DL (ref 6.4–8.2)
UROBILINOGEN,URINE: 1 MG/DL
WBC,URINE: (no result) /HPF (ref 0–4)

## 2018-01-28 RX ADMIN — FENTANYL 1 PATCH: 50 PATCH, EXTENDED RELEASE TRANSDERMAL at 09:00

## 2018-01-28 RX ADMIN — PANCRELIPASE LIPASE, PANCRELIPASE PROTEASE, PANCRELIPASE AMYLASE 1 CAP: 10000; 32000; 42000 CAPSULE, DELAYED RELEASE ORAL at 08:21

## 2018-01-28 RX ADMIN — PANTOPRAZOLE SODIUM 1 MG: 40 TABLET, DELAYED RELEASE ORAL at 08:21

## 2018-01-28 RX ADMIN — PANCRELIPASE LIPASE, PANCRELIPASE PROTEASE, PANCRELIPASE AMYLASE 1 CAP: 10000; 32000; 42000 CAPSULE, DELAYED RELEASE ORAL at 17:00

## 2018-01-28 RX ADMIN — POTASSIUM CHLORIDE 1 MEQ: 750 TABLET, FILM COATED, EXTENDED RELEASE ORAL at 21:42

## 2018-01-28 RX ADMIN — MEROPENEM 1 MG: 500 INJECTION, POWDER, FOR SOLUTION INTRAVENOUS at 12:31

## 2018-01-28 RX ADMIN — PANCRELIPASE LIPASE, PANCRELIPASE PROTEASE, PANCRELIPASE AMYLASE 1 CAP: 10000; 32000; 42000 CAPSULE, DELAYED RELEASE ORAL at 11:31

## 2018-01-28 RX ADMIN — ISOSORBIDE MONONITRATE 1 MG: 30 TABLET, EXTENDED RELEASE ORAL at 08:21

## 2018-01-28 RX ADMIN — SODIUM BICARBONATE 1 MLS/HR: 84 INJECTION, SOLUTION INTRAVENOUS at 08:22

## 2018-01-28 RX ADMIN — MEROPENEM 1 MG: 500 INJECTION, POWDER, FOR SOLUTION INTRAVENOUS at 21:42

## 2018-01-28 RX ADMIN — SODIUM CHLORIDE 1 MLS/HR: 450 INJECTION, SOLUTION INTRAVENOUS at 13:59

## 2018-01-28 RX ADMIN — MEROPENEM 1 MG: 500 INJECTION, POWDER, FOR SOLUTION INTRAVENOUS at 06:51

## 2018-01-28 RX ADMIN — AZITHROMYCIN 1 MG: 250 TABLET, FILM COATED ORAL at 13:59

## 2018-01-28 RX ADMIN — POTASSIUM CHLORIDE 1 MEQ: 750 TABLET, FILM COATED, EXTENDED RELEASE ORAL at 12:31

## 2018-01-29 LAB
ADD MAN DIFF?: NO
ANION GAP SERPL CALC-SCNC: 12 MMOL/L (ref 6–14)
ANION GAP SERPL CALC-SCNC: 12 MMOL/L (ref 6–14)
BASO #: 0 X10^3/UL (ref 0–0.2)
BASO %: 0 % (ref 0–3)
BLOOD UREA NITROGEN: 36 MG/DL (ref 7–20)
BLOOD UREA NITROGEN: 37 MG/DL (ref 7–20)
CALCIUM: 7.6 MG/DL (ref 8.5–10.1)
CALCIUM: 8.8 MG/DL (ref 8.5–10.1)
CHLORIDE: 106 MMOL/L (ref 98–107)
CHLORIDE: 109 MMOL/L (ref 98–107)
CO2 SERPL-SCNC: 20 MMOL/L (ref 21–32)
CO2 SERPL-SCNC: 22 MMOL/L (ref 21–32)
CREAT SERPL-MCNC: 1.4 MG/DL (ref 0.6–1)
CREAT SERPL-MCNC: 1.4 MG/DL (ref 0.6–1)
EOS #: 0 X10^3/UL (ref 0–0.7)
EOS %: 0 % (ref 0–3)
GFR SERPLBLD BASED ON 1.73 SQ M-ARVRAT: 36.2 ML/MIN
GFR SERPLBLD BASED ON 1.73 SQ M-ARVRAT: 36.2 ML/MIN
GLUCOSE SERPL-MCNC: 103 MG/DL (ref 70–99)
GLUCOSE SERPL-MCNC: 147 MG/DL (ref 70–99)
HCG SERPL-ACNC: 21.2 X10^3/UL (ref 4–11)
HEMATOCRIT: 48.5 % (ref 36–47)
HEMOGLOBIN: 16 G/DL (ref 12–15.5)
LYMPH #: 0.2 X10^3/UL (ref 1–4.8)
LYMPH %: 1 % (ref 24–48)
MAGNESIUM: 1.8 MG/DL (ref 1.8–2.4)
MEAN CORPUSCULAR HEMOGLOBIN: 32 PG (ref 25–35)
MEAN CORPUSCULAR HGB CONC: 33 G/DL (ref 31–37)
MEAN CORPUSCULAR VOLUME: 97 FL (ref 79–100)
MONO #: 1.4 X10^3/UL (ref 0–1.1)
MONO %: 7 % (ref 0–9)
NEUT #: 19.5 X10^3UL (ref 1.8–7.7)
NEUT %: 92 % (ref 31–73)
PHOSPHORUS: 2.2 MG/DL (ref 2.6–4.7)
PLATELET COUNT: 229 X10^3/UL (ref 140–400)
POTASSIUM SERPL-SCNC: 3.6 MMOL/L (ref 3.5–5.1)
POTASSIUM SERPL-SCNC: 3.9 MMOL/L (ref 3.5–5.1)
RED BLOOD COUNT: 4.98 X10^6/UL (ref 3.5–5.4)
RED CELL DISTRIBUTION WIDTH: 14.2 % (ref 11.5–14.5)
SODIUM: 138 MMOL/L (ref 136–145)
SODIUM: 143 MMOL/L (ref 136–145)

## 2018-01-29 RX ADMIN — Medication 1 CAP: at 12:56

## 2018-01-29 RX ADMIN — Medication 1 CAP: at 22:10

## 2018-01-29 RX ADMIN — ISOSORBIDE MONONITRATE 1 MG: 30 TABLET, EXTENDED RELEASE ORAL at 08:37

## 2018-01-29 RX ADMIN — MEROPENEM 1 MG: 500 INJECTION, POWDER, FOR SOLUTION INTRAVENOUS at 06:40

## 2018-01-29 RX ADMIN — PANCRELIPASE LIPASE, PANCRELIPASE PROTEASE, PANCRELIPASE AMYLASE 1 CAP: 10000; 32000; 42000 CAPSULE, DELAYED RELEASE ORAL at 12:55

## 2018-01-29 RX ADMIN — AZITHROMYCIN 1 MG: 250 TABLET, FILM COATED ORAL at 08:37

## 2018-01-29 RX ADMIN — PANTOPRAZOLE SODIUM 1 MG: 40 TABLET, DELAYED RELEASE ORAL at 06:40

## 2018-01-29 RX ADMIN — MEROPENEM 1 MG: 500 INJECTION, POWDER, FOR SOLUTION INTRAVENOUS at 12:56

## 2018-01-29 RX ADMIN — SODIUM CHLORIDE 1 MLS/HR: 450 INJECTION, SOLUTION INTRAVENOUS at 06:42

## 2018-01-29 RX ADMIN — POTASSIUM CHLORIDE 1 MEQ: 750 TABLET, FILM COATED, EXTENDED RELEASE ORAL at 08:37

## 2018-01-29 RX ADMIN — MEROPENEM 1 MG: 500 INJECTION, POWDER, FOR SOLUTION INTRAVENOUS at 22:14

## 2018-01-29 RX ADMIN — PANCRELIPASE LIPASE, PANCRELIPASE PROTEASE, PANCRELIPASE AMYLASE 1 CAP: 10000; 32000; 42000 CAPSULE, DELAYED RELEASE ORAL at 18:21

## 2018-01-29 RX ADMIN — PANCRELIPASE LIPASE, PANCRELIPASE PROTEASE, PANCRELIPASE AMYLASE 1 CAP: 10000; 32000; 42000 CAPSULE, DELAYED RELEASE ORAL at 08:36

## 2018-01-30 LAB
ADD MAN DIFF?: NO
ALBUMIN SERPL-MCNC: 1.9 G/DL (ref 3.4–5)
ALBUMIN/GLOB SERPL: 0.4 {RATIO} (ref 1–1.7)
ALP SERPL-CCNC: 91 U/L (ref 46–116)
ALT (SGPT): 39 U/L (ref 14–59)
ANION GAP SERPL CALC-SCNC: 11 MMOL/L (ref 6–14)
AST SERPL-CCNC: 36 U/L (ref 15–37)
BASO #: 0.1 X10^3/UL (ref 0–0.2)
BASO %: 0 % (ref 0–3)
BLOOD UREA NITROGEN: 28 MG/DL (ref 7–20)
BODY FLUID CULT: (no result)
BUN/CREAT SERPL: 22 (ref 6–20)
CALCIUM: 8.7 MG/DL (ref 8.5–10.1)
CHLORIDE: 108 MMOL/L (ref 98–107)
CO2 SERPL-SCNC: 23 MMOL/L (ref 21–32)
CREAT SERPL-MCNC: 1.3 MG/DL (ref 0.6–1)
EOS #: 0 X10^3/UL (ref 0–0.7)
EOS %: 0 % (ref 0–3)
GFR SERPLBLD BASED ON 1.73 SQ M-ARVRAT: 39.4 ML/MIN
GLOBULIN SER-MCNC: 4.9 G/DL (ref 2.2–3.8)
GLUCOSE SERPL-MCNC: 130 MG/DL (ref 70–99)
HCG SERPL-ACNC: 17.6 X10^3/UL (ref 4–11)
HEMATOCRIT: 44.8 % (ref 36–47)
HEMOGLOBIN: 14.8 G/DL (ref 12–15.5)
LYMPH #: 0.2 X10^3/UL (ref 1–4.8)
LYMPH %: 1 % (ref 24–48)
MEAN CORPUSCULAR HEMOGLOBIN: 32 PG (ref 25–35)
MEAN CORPUSCULAR HGB CONC: 33 G/DL (ref 31–37)
MEAN CORPUSCULAR VOLUME: 98 FL (ref 79–100)
MONO #: 1.4 X10^3/UL (ref 0–1.1)
MONO %: 8 % (ref 0–9)
NEUT #: 15.9 X10^3UL (ref 1.8–7.7)
NEUT %: 90 % (ref 31–73)
ORGANISM ID: (no result)
PLATELET COUNT: 231 X10^3/UL (ref 140–400)
POTASSIUM SERPL-SCNC: 3.4 MMOL/L (ref 3.5–5.1)
RED BLOOD COUNT: 4.58 X10^6/UL (ref 3.5–5.4)
RED CELL DISTRIBUTION WIDTH: 14.1 % (ref 11.5–14.5)
SODIUM: 142 MMOL/L (ref 136–145)
TOTAL BILIRUBIN: 0.4 MG/DL (ref 0.2–1)
TOTAL PROTEIN: 6.8 G/DL (ref 6.4–8.2)

## 2018-01-30 RX ADMIN — PANCRELIPASE LIPASE, PANCRELIPASE PROTEASE, PANCRELIPASE AMYLASE 1 CAP: 10000; 32000; 42000 CAPSULE, DELAYED RELEASE ORAL at 09:49

## 2018-01-30 RX ADMIN — MEROPENEM 1 MG: 500 INJECTION, POWDER, FOR SOLUTION INTRAVENOUS at 06:05

## 2018-01-30 RX ADMIN — POTASSIUM PHOSPHATE, MONOBASIC AND POTASSIUM PHOSPHATE, DIBASIC 1 MLS/HR: 224; 236 INJECTION, SOLUTION INTRAVENOUS at 11:08

## 2018-01-30 RX ADMIN — AZITHROMYCIN 1 MG: 250 TABLET, FILM COATED ORAL at 09:55

## 2018-01-30 RX ADMIN — POTASSIUM PHOSPHATE, MONOBASIC AND POTASSIUM PHOSPHATE, DIBASIC 1 MLS/HR: 224; 236 INJECTION, SOLUTION INTRAVENOUS at 15:12

## 2018-01-30 RX ADMIN — ALBUMIN (HUMAN) 1 MLS/HR: 0.25 INJECTION, SOLUTION INTRAVENOUS at 16:30

## 2018-01-30 RX ADMIN — PANCRELIPASE LIPASE, PANCRELIPASE PROTEASE, PANCRELIPASE AMYLASE 1 CAP: 10000; 32000; 42000 CAPSULE, DELAYED RELEASE ORAL at 17:00

## 2018-01-30 RX ADMIN — POTASSIUM PHOSPHATE, MONOBASIC AND POTASSIUM PHOSPHATE, DIBASIC 1 MLS/HR: 224; 236 INJECTION, SOLUTION INTRAVENOUS at 13:56

## 2018-01-30 RX ADMIN — ALBUMIN (HUMAN) 1 MLS/HR: 0.25 INJECTION, SOLUTION INTRAVENOUS at 21:03

## 2018-01-30 RX ADMIN — ISOSORBIDE MONONITRATE 1 MG: 30 TABLET, EXTENDED RELEASE ORAL at 09:50

## 2018-01-30 RX ADMIN — Medication 1 CAP: at 09:49

## 2018-01-30 RX ADMIN — PANTOPRAZOLE SODIUM 1 MG: 40 TABLET, DELAYED RELEASE ORAL at 06:25

## 2018-01-30 RX ADMIN — MEROPENEM 1 MG: 500 INJECTION, POWDER, FOR SOLUTION INTRAVENOUS at 21:48

## 2018-01-30 RX ADMIN — MEROPENEM 1 MG: 500 INJECTION, POWDER, FOR SOLUTION INTRAVENOUS at 15:54

## 2018-01-30 RX ADMIN — PANCRELIPASE LIPASE, PANCRELIPASE PROTEASE, PANCRELIPASE AMYLASE 1 CAP: 10000; 32000; 42000 CAPSULE, DELAYED RELEASE ORAL at 13:56

## 2018-01-30 RX ADMIN — Medication 1 CAP: at 21:03

## 2018-01-31 LAB
ADD MAN DIFF?: NO
ANION GAP SERPL CALC-SCNC: 11 MMOL/L (ref 6–14)
BASE EXCESS ABG: 0 MMOL/L (ref -3–3)
BASO #: 0 X10^3/UL (ref 0–0.2)
BASO %: 0 % (ref 0–3)
BLOOD UREA NITROGEN: 26 MG/DL (ref 7–20)
CALCIUM: 8.2 MG/DL (ref 8.5–10.1)
CHLORIDE: 111 MMOL/L (ref 98–107)
CO2 SERPL-SCNC: 23 MMOL/L (ref 21–32)
CREAT SERPL-MCNC: 1.1 MG/DL (ref 0.6–1)
EOS #: 0.1 X10^3/UL (ref 0–0.7)
EOS %: 1 % (ref 0–3)
FIO2 ABG: 40
GFR SERPLBLD BASED ON 1.73 SQ M-ARVRAT: 47.8 ML/MIN
GLUCOSE SERPL-MCNC: 130 MG/DL (ref 70–99)
HCG SERPL-ACNC: 17 X10^3/UL (ref 4–11)
HCO3 ABG: 23 MMOL/L (ref 21–28)
HEMATOCRIT: 38.8 % (ref 36–47)
HEMOGLOBIN: 12.5 G/DL (ref 12–15.5)
LYMPH #: 0.3 X10^3/UL (ref 1–4.8)
LYMPH %: 2 % (ref 24–48)
MAGNESIUM: 1.9 MG/DL (ref 1.8–2.4)
MEAN CORPUSCULAR HEMOGLOBIN: 32 PG (ref 25–35)
MEAN CORPUSCULAR HGB CONC: 32 G/DL (ref 31–37)
MEAN CORPUSCULAR VOLUME: 98 FL (ref 79–100)
MONO #: 1.4 X10^3/UL (ref 0–1.1)
MONO %: 8 % (ref 0–9)
NEUT #: 15.2 X10^3UL (ref 1.8–7.7)
NEUT %: 90 % (ref 31–73)
PCO2 ABG: 30 MMHG (ref 35–46)
PH ABG: 7.5 (ref 7.35–7.45)
PLATELET COUNT: 236 X10^3/UL (ref 140–400)
PO2 ABG: 50 MMHG (ref 65–108)
POTASSIUM SERPL-SCNC: 4.1 MMOL/L (ref 3.5–5.1)
RED BLOOD COUNT: 3.97 X10^6/UL (ref 3.5–5.4)
RED CELL DISTRIBUTION WIDTH: 14.3 % (ref 11.5–14.5)
SAT O2 ABG: 87 % (ref 92–99)
SODIUM: 145 MMOL/L (ref 136–145)

## 2018-01-31 RX ADMIN — MEROPENEM 1 MG: 500 INJECTION, POWDER, FOR SOLUTION INTRAVENOUS at 06:02

## 2018-01-31 RX ADMIN — METHYLPREDNISOLONE SODIUM SUCCINATE 1 MG: 125 INJECTION, POWDER, FOR SOLUTION INTRAMUSCULAR; INTRAVENOUS at 17:45

## 2018-01-31 RX ADMIN — FUROSEMIDE 1 MG: 10 INJECTION, SOLUTION INTRAMUSCULAR; INTRAVENOUS at 17:30

## 2018-01-31 RX ADMIN — ALBUMIN (HUMAN) 1 MLS/HR: 0.25 INJECTION, SOLUTION INTRAVENOUS at 22:28

## 2018-01-31 RX ADMIN — METHYLPREDNISOLONE SODIUM SUCCINATE 1 MG: 125 INJECTION, POWDER, FOR SOLUTION INTRAMUSCULAR; INTRAVENOUS at 22:28

## 2018-01-31 RX ADMIN — FENTANYL 1 PATCH: 50 PATCH, EXTENDED RELEASE TRANSDERMAL at 09:16

## 2018-01-31 RX ADMIN — VANCOMYCIN HYDROCHLORIDE 1 MG: 500 INJECTION, POWDER, LYOPHILIZED, FOR SOLUTION INTRAVENOUS at 22:44

## 2018-01-31 RX ADMIN — PANCRELIPASE LIPASE, PANCRELIPASE PROTEASE, PANCRELIPASE AMYLASE 1 CAP: 10000; 32000; 42000 CAPSULE, DELAYED RELEASE ORAL at 17:00

## 2018-01-31 RX ADMIN — PANCRELIPASE LIPASE, PANCRELIPASE PROTEASE, PANCRELIPASE AMYLASE 1 CAP: 10000; 32000; 42000 CAPSULE, DELAYED RELEASE ORAL at 09:16

## 2018-01-31 RX ADMIN — Medication 1 CAP: at 09:16

## 2018-01-31 RX ADMIN — VANCOMYCIN HYDROCHLORIDE 1 MG: 500 INJECTION, POWDER, LYOPHILIZED, FOR SOLUTION INTRAVENOUS at 17:34

## 2018-01-31 RX ADMIN — FUROSEMIDE 1 MG: 10 INJECTION, SOLUTION INTRAMUSCULAR; INTRAVENOUS at 17:34

## 2018-01-31 RX ADMIN — MEROPENEM 1 MG: 500 INJECTION, POWDER, FOR SOLUTION INTRAVENOUS at 22:47

## 2018-01-31 RX ADMIN — Medication 1 CAP: at 22:28

## 2018-01-31 RX ADMIN — VANCOMYCIN HYDROCHLORIDE 1 MG: 500 INJECTION, POWDER, LYOPHILIZED, FOR SOLUTION INTRAVENOUS at 12:41

## 2018-01-31 RX ADMIN — ISOSORBIDE MONONITRATE 1 MG: 30 TABLET, EXTENDED RELEASE ORAL at 09:14

## 2018-01-31 RX ADMIN — AZITHROMYCIN 1 MG: 250 TABLET, FILM COATED ORAL at 09:14

## 2018-01-31 RX ADMIN — ALBUMIN (HUMAN) 1 MLS/HR: 0.25 INJECTION, SOLUTION INTRAVENOUS at 09:13

## 2018-01-31 RX ADMIN — PANTOPRAZOLE SODIUM 1 MG: 40 TABLET, DELAYED RELEASE ORAL at 06:41

## 2018-01-31 RX ADMIN — MEROPENEM 1 MG: 500 INJECTION, POWDER, FOR SOLUTION INTRAVENOUS at 14:02

## 2018-01-31 RX ADMIN — PANCRELIPASE LIPASE, PANCRELIPASE PROTEASE, PANCRELIPASE AMYLASE 1 CAP: 10000; 32000; 42000 CAPSULE, DELAYED RELEASE ORAL at 12:41

## 2018-02-01 LAB
ADD MAN DIFF?: NO
ALBUMIN SERPL-MCNC: 3 G/DL (ref 3.4–5)
ALBUMIN/GLOB SERPL: 0.8 {RATIO} (ref 1–1.7)
ALP SERPL-CCNC: 72 U/L (ref 46–116)
ALT (SGPT): 49 U/L (ref 14–59)
ANION GAP SERPL CALC-SCNC: 11 MMOL/L (ref 6–14)
AST SERPL-CCNC: 44 U/L (ref 15–37)
BASO #: 0 X10^3/UL (ref 0–0.2)
BASO %: 0 % (ref 0–3)
BLOOD UREA NITROGEN: 34 MG/DL (ref 7–20)
BUN/CREAT SERPL: 31 (ref 6–20)
CALCIUM: 8.7 MG/DL (ref 8.5–10.1)
CHLORIDE: 106 MMOL/L (ref 98–107)
CO2 SERPL-SCNC: 26 MMOL/L (ref 21–32)
CREAT SERPL-MCNC: 1.1 MG/DL (ref 0.6–1)
EOS #: 0 X10^3/UL (ref 0–0.7)
EOS %: 0 % (ref 0–3)
GFR SERPLBLD BASED ON 1.73 SQ M-ARVRAT: 47.8 ML/MIN
GLOBULIN SER-MCNC: 3.7 G/DL (ref 2.2–3.8)
GLUCOSE SERPL-MCNC: 154 MG/DL (ref 70–99)
HCG SERPL-ACNC: 9.7 X10^3/UL (ref 4–11)
HEMATOCRIT: 35.8 % (ref 36–47)
HEMOGLOBIN: 11.8 G/DL (ref 12–15.5)
LYMPH #: 0.2 X10^3/UL (ref 1–4.8)
LYMPH %: 2 % (ref 24–48)
MEAN CORPUSCULAR HEMOGLOBIN: 32 PG (ref 25–35)
MEAN CORPUSCULAR HGB CONC: 33 G/DL (ref 31–37)
MEAN CORPUSCULAR VOLUME: 97 FL (ref 79–100)
MONO #: 0.2 X10^3/UL (ref 0–1.1)
MONO %: 2 % (ref 0–9)
NEUT #: 9.3 X10^3UL (ref 1.8–7.7)
NEUT %: 96 % (ref 31–73)
PLATELET COUNT: 223 X10^3/UL (ref 140–400)
POTASSIUM SERPL-SCNC: 4 MMOL/L (ref 3.5–5.1)
RED BLOOD COUNT: 3.68 X10^6/UL (ref 3.5–5.4)
RED CELL DISTRIBUTION WIDTH: 13.9 % (ref 11.5–14.5)
SODIUM: 143 MMOL/L (ref 136–145)
TOTAL BILIRUBIN: 0.4 MG/DL (ref 0.2–1)
TOTAL PROTEIN: 6.7 G/DL (ref 6.4–8.2)

## 2018-02-01 RX ADMIN — MEROPENEM 1 MG: 500 INJECTION, POWDER, FOR SOLUTION INTRAVENOUS at 05:51

## 2018-02-01 RX ADMIN — METHYLPREDNISOLONE SODIUM SUCCINATE 1 MG: 125 INJECTION, POWDER, FOR SOLUTION INTRAMUSCULAR; INTRAVENOUS at 14:57

## 2018-02-01 RX ADMIN — MEROPENEM 1 MG: 500 INJECTION, POWDER, FOR SOLUTION INTRAVENOUS at 21:51

## 2018-02-01 RX ADMIN — ISOSORBIDE MONONITRATE 1 MG: 30 TABLET, EXTENDED RELEASE ORAL at 10:18

## 2018-02-01 RX ADMIN — AZITHROMYCIN 1 MG: 250 TABLET, FILM COATED ORAL at 10:18

## 2018-02-01 RX ADMIN — Medication 1 CAP: at 10:18

## 2018-02-01 RX ADMIN — ALBUMIN (HUMAN) 1 MLS/HR: 0.25 INJECTION, SOLUTION INTRAVENOUS at 21:47

## 2018-02-01 RX ADMIN — MEROPENEM 1 MG: 500 INJECTION, POWDER, FOR SOLUTION INTRAVENOUS at 14:57

## 2018-02-01 RX ADMIN — METHYLPREDNISOLONE SODIUM SUCCINATE 1 MG: 125 INJECTION, POWDER, FOR SOLUTION INTRAMUSCULAR; INTRAVENOUS at 05:54

## 2018-02-01 RX ADMIN — PANCRELIPASE LIPASE, PANCRELIPASE PROTEASE, PANCRELIPASE AMYLASE 1 CAP: 10000; 32000; 42000 CAPSULE, DELAYED RELEASE ORAL at 17:14

## 2018-02-01 RX ADMIN — Medication 1 CAP: at 21:50

## 2018-02-01 RX ADMIN — VANCOMYCIN HYDROCHLORIDE 1 MG: 500 INJECTION, POWDER, LYOPHILIZED, FOR SOLUTION INTRAVENOUS at 12:05

## 2018-02-01 RX ADMIN — VANCOMYCIN HYDROCHLORIDE 1 MG: 500 INJECTION, POWDER, LYOPHILIZED, FOR SOLUTION INTRAVENOUS at 21:51

## 2018-02-01 RX ADMIN — ISOSORBIDE MONONITRATE 1 MG: 30 TABLET, EXTENDED RELEASE ORAL at 09:59

## 2018-02-01 RX ADMIN — PANTOPRAZOLE SODIUM 1 MG: 40 TABLET, DELAYED RELEASE ORAL at 05:55

## 2018-02-01 RX ADMIN — VANCOMYCIN HYDROCHLORIDE 1 MG: 500 INJECTION, POWDER, LYOPHILIZED, FOR SOLUTION INTRAVENOUS at 09:58

## 2018-02-01 RX ADMIN — ALBUMIN (HUMAN) 1 MLS/HR: 0.25 INJECTION, SOLUTION INTRAVENOUS at 09:57

## 2018-02-01 RX ADMIN — METHYLPREDNISOLONE SODIUM SUCCINATE 1 MG: 125 INJECTION, POWDER, FOR SOLUTION INTRAMUSCULAR; INTRAVENOUS at 21:49

## 2018-02-01 RX ADMIN — Medication 1 CAP: at 09:58

## 2018-02-01 RX ADMIN — PANCRELIPASE LIPASE, PANCRELIPASE PROTEASE, PANCRELIPASE AMYLASE 1 CAP: 10000; 32000; 42000 CAPSULE, DELAYED RELEASE ORAL at 11:57

## 2018-02-01 RX ADMIN — AZITHROMYCIN 1 MG: 250 TABLET, FILM COATED ORAL at 09:58

## 2018-02-01 RX ADMIN — VANCOMYCIN HYDROCHLORIDE 1 MG: 500 INJECTION, POWDER, LYOPHILIZED, FOR SOLUTION INTRAVENOUS at 17:14

## 2018-02-01 RX ADMIN — PANCRELIPASE LIPASE, PANCRELIPASE PROTEASE, PANCRELIPASE AMYLASE 1 CAP: 10000; 32000; 42000 CAPSULE, DELAYED RELEASE ORAL at 09:58

## 2018-02-01 RX ADMIN — FUROSEMIDE 1 MG: 10 INJECTION, SOLUTION INTRAMUSCULAR; INTRAVENOUS at 17:53

## 2018-02-02 LAB
ADD MAN DIFF?: NO
ALBUMIN SERPL-MCNC: 3.5 G/DL (ref 3.4–5)
ALBUMIN/GLOB SERPL: 1.1 {RATIO} (ref 1–1.7)
ALP SERPL-CCNC: 65 U/L (ref 46–116)
ALT (SGPT): 63 U/L (ref 14–59)
ANION GAP SERPL CALC-SCNC: 7 MMOL/L (ref 6–14)
AST SERPL-CCNC: 54 U/L (ref 15–37)
BASO #: 0 X10^3/UL (ref 0–0.2)
BASO %: 0 % (ref 0–3)
BLOOD UREA NITROGEN: 60 MG/DL (ref 7–20)
BUN/CREAT SERPL: 46 (ref 6–20)
CALCIUM: 8.9 MG/DL (ref 8.5–10.1)
CHLORIDE: 103 MMOL/L (ref 98–107)
CO2 SERPL-SCNC: 29 MMOL/L (ref 21–32)
CREAT SERPL-MCNC: 1.3 MG/DL (ref 0.6–1)
EOS #: 0 X10^3/UL (ref 0–0.7)
EOS %: 0 % (ref 0–3)
GFR SERPLBLD BASED ON 1.73 SQ M-ARVRAT: 39.4 ML/MIN
GLOBULIN SER-MCNC: 3.2 G/DL (ref 2.2–3.8)
GLUCOSE SERPL-MCNC: 131 MG/DL (ref 70–99)
HCG SERPL-ACNC: 9.5 X10^3/UL (ref 4–11)
HEMATOCRIT: 33.8 % (ref 36–47)
HEMOGLOBIN: 11.2 G/DL (ref 12–15.5)
LYMPH #: 0.2 X10^3/UL (ref 1–4.8)
LYMPH %: 2 % (ref 24–48)
MEAN CORPUSCULAR HEMOGLOBIN: 33 PG (ref 25–35)
MEAN CORPUSCULAR HGB CONC: 33 G/DL (ref 31–37)
MEAN CORPUSCULAR VOLUME: 98 FL (ref 79–100)
MONO #: 0.7 X10^3/UL (ref 0–1.1)
MONO %: 7 % (ref 0–9)
NEUT #: 8.6 X10^3UL (ref 1.8–7.7)
NEUT %: 91 % (ref 31–73)
PLATELET COUNT: 253 X10^3/UL (ref 140–400)
POTASSIUM SERPL-SCNC: 3.9 MMOL/L (ref 3.5–5.1)
RED BLOOD COUNT: 3.44 X10^6/UL (ref 3.5–5.4)
RED CELL DISTRIBUTION WIDTH: 14.1 % (ref 11.5–14.5)
SODIUM: 139 MMOL/L (ref 136–145)
TOTAL BILIRUBIN: 0.3 MG/DL (ref 0.2–1)
TOTAL PROTEIN: 6.7 G/DL (ref 6.4–8.2)

## 2018-02-02 RX ADMIN — MEROPENEM 1 MG: 500 INJECTION, POWDER, FOR SOLUTION INTRAVENOUS at 05:44

## 2018-02-02 RX ADMIN — VANCOMYCIN HYDROCHLORIDE 1 MG: 500 INJECTION, POWDER, LYOPHILIZED, FOR SOLUTION INTRAVENOUS at 13:05

## 2018-02-02 RX ADMIN — Medication 1 CAP: at 21:20

## 2018-02-02 RX ADMIN — PANTOPRAZOLE SODIUM 1 MG: 40 TABLET, DELAYED RELEASE ORAL at 05:49

## 2018-02-02 RX ADMIN — PANCRELIPASE LIPASE, PANCRELIPASE PROTEASE, PANCRELIPASE AMYLASE 1 CAP: 10000; 32000; 42000 CAPSULE, DELAYED RELEASE ORAL at 13:05

## 2018-02-02 RX ADMIN — METHYLPREDNISOLONE SODIUM SUCCINATE 1 MG: 125 INJECTION, POWDER, FOR SOLUTION INTRAMUSCULAR; INTRAVENOUS at 05:45

## 2018-02-02 RX ADMIN — PANCRELIPASE LIPASE, PANCRELIPASE PROTEASE, PANCRELIPASE AMYLASE 1 CAP: 10000; 32000; 42000 CAPSULE, DELAYED RELEASE ORAL at 17:30

## 2018-02-02 RX ADMIN — METHYLPREDNISOLONE SODIUM SUCCINATE 1 MG: 125 INJECTION, POWDER, FOR SOLUTION INTRAMUSCULAR; INTRAVENOUS at 13:07

## 2018-02-02 RX ADMIN — METHYLPREDNISOLONE SODIUM SUCCINATE 1 MG: 125 INJECTION, POWDER, FOR SOLUTION INTRAMUSCULAR; INTRAVENOUS at 21:20

## 2018-02-02 RX ADMIN — PANCRELIPASE LIPASE, PANCRELIPASE PROTEASE, PANCRELIPASE AMYLASE 1 CAP: 10000; 32000; 42000 CAPSULE, DELAYED RELEASE ORAL at 08:34

## 2018-02-02 RX ADMIN — VANCOMYCIN HYDROCHLORIDE 1 MG: 500 INJECTION, POWDER, LYOPHILIZED, FOR SOLUTION INTRAVENOUS at 21:19

## 2018-02-02 RX ADMIN — VANCOMYCIN HYDROCHLORIDE 1 MG: 500 INJECTION, POWDER, LYOPHILIZED, FOR SOLUTION INTRAVENOUS at 08:34

## 2018-02-02 RX ADMIN — VANCOMYCIN HYDROCHLORIDE 1 MG: 500 INJECTION, POWDER, LYOPHILIZED, FOR SOLUTION INTRAVENOUS at 17:30

## 2018-02-03 LAB
ADD MAN DIFF?: NO
ALBUMIN SERPL-MCNC: 3.1 G/DL (ref 3.4–5)
ALBUMIN/GLOB SERPL: 0.9 {RATIO} (ref 1–1.7)
ALP SERPL-CCNC: 78 U/L (ref 46–116)
ALT (SGPT): 59 U/L (ref 14–59)
ANION GAP SERPL CALC-SCNC: 10 MMOL/L (ref 6–14)
AST SERPL-CCNC: 34 U/L (ref 15–37)
BASO #: 0 X10^3/UL (ref 0–0.2)
BASO %: 0 % (ref 0–3)
BLOOD UREA NITROGEN: 76 MG/DL (ref 7–20)
BUN/CREAT SERPL: 69 (ref 6–20)
CALCIUM: 9.5 MG/DL (ref 8.5–10.1)
CHLORIDE: 103 MMOL/L (ref 98–107)
CO2 SERPL-SCNC: 26 MMOL/L (ref 21–32)
CREAT SERPL-MCNC: 1.1 MG/DL (ref 0.6–1)
EOS #: 0 X10^3/UL (ref 0–0.7)
EOS %: 0 % (ref 0–3)
GFR SERPLBLD BASED ON 1.73 SQ M-ARVRAT: 47.8 ML/MIN
GLOBULIN SER-MCNC: 3.6 G/DL (ref 2.2–3.8)
GLUCOSE SERPL-MCNC: 124 MG/DL (ref 70–99)
HCG SERPL-ACNC: 10.2 X10^3/UL (ref 4–11)
HEMATOCRIT: 38.8 % (ref 36–47)
HEMOGLOBIN: 13.1 G/DL (ref 12–15.5)
LYMPH #: 0.2 X10^3/UL (ref 1–4.8)
LYMPH %: 2 % (ref 24–48)
MEAN CORPUSCULAR HEMOGLOBIN: 33 PG (ref 25–35)
MEAN CORPUSCULAR HGB CONC: 34 G/DL (ref 31–37)
MEAN CORPUSCULAR VOLUME: 97 FL (ref 79–100)
MONO #: 0.5 X10^3/UL (ref 0–1.1)
MONO %: 5 % (ref 0–9)
NEUT #: 9.5 X10^3UL (ref 1.8–7.7)
NEUT %: 93 % (ref 31–73)
PLATELET COUNT: 287 X10^3/UL (ref 140–400)
POTASSIUM SERPL-SCNC: 4.3 MMOL/L (ref 3.5–5.1)
RED BLOOD COUNT: 4 X10^6/UL (ref 3.5–5.4)
RED CELL DISTRIBUTION WIDTH: 13.8 % (ref 11.5–14.5)
SODIUM: 139 MMOL/L (ref 136–145)
TOTAL BILIRUBIN: 0.4 MG/DL (ref 0.2–1)
TOTAL PROTEIN: 6.7 G/DL (ref 6.4–8.2)

## 2018-02-03 RX ADMIN — METHYLPREDNISOLONE SODIUM SUCCINATE 1 MG: 125 INJECTION, POWDER, FOR SOLUTION INTRAMUSCULAR; INTRAVENOUS at 06:03

## 2018-02-03 RX ADMIN — ISOSORBIDE MONONITRATE 1 MG: 30 TABLET, EXTENDED RELEASE ORAL at 09:43

## 2018-02-03 RX ADMIN — PANTOPRAZOLE SODIUM 1 MG: 40 TABLET, DELAYED RELEASE ORAL at 09:43

## 2018-02-03 RX ADMIN — Medication 1 CAP: at 09:43

## 2018-02-03 RX ADMIN — PANCRELIPASE LIPASE, PANCRELIPASE PROTEASE, PANCRELIPASE AMYLASE 1 CAP: 10000; 32000; 42000 CAPSULE, DELAYED RELEASE ORAL at 09:43

## 2018-02-03 RX ADMIN — VANCOMYCIN HYDROCHLORIDE 1 MG: 500 INJECTION, POWDER, LYOPHILIZED, FOR SOLUTION INTRAVENOUS at 13:46

## 2018-02-03 RX ADMIN — Medication 1 CAP: at 20:56

## 2018-02-03 RX ADMIN — METHYLPREDNISOLONE SODIUM SUCCINATE 1 MG: 125 INJECTION, POWDER, FOR SOLUTION INTRAMUSCULAR; INTRAVENOUS at 13:46

## 2018-02-03 RX ADMIN — PANCRELIPASE LIPASE, PANCRELIPASE PROTEASE, PANCRELIPASE AMYLASE 1 CAP: 10000; 32000; 42000 CAPSULE, DELAYED RELEASE ORAL at 10:56

## 2018-02-03 RX ADMIN — FENTANYL 1 PATCH: 50 PATCH, EXTENDED RELEASE TRANSDERMAL at 09:47

## 2018-02-03 RX ADMIN — VANCOMYCIN HYDROCHLORIDE 1 MG: 500 INJECTION, POWDER, LYOPHILIZED, FOR SOLUTION INTRAVENOUS at 10:56

## 2018-02-03 RX ADMIN — AZITHROMYCIN 1 MG: 250 TABLET, FILM COATED ORAL at 09:43

## 2018-02-03 RX ADMIN — METHYLPREDNISOLONE SODIUM SUCCINATE 1 MG: 125 INJECTION, POWDER, FOR SOLUTION INTRAMUSCULAR; INTRAVENOUS at 20:56

## 2018-02-03 RX ADMIN — PANCRELIPASE LIPASE, PANCRELIPASE PROTEASE, PANCRELIPASE AMYLASE 1 CAP: 10000; 32000; 42000 CAPSULE, DELAYED RELEASE ORAL at 17:25

## 2018-02-03 RX ADMIN — VANCOMYCIN HYDROCHLORIDE 1 MG: 500 INJECTION, POWDER, LYOPHILIZED, FOR SOLUTION INTRAVENOUS at 17:25

## 2018-02-03 RX ADMIN — VANCOMYCIN HYDROCHLORIDE 1 MG: 500 INJECTION, POWDER, LYOPHILIZED, FOR SOLUTION INTRAVENOUS at 20:56

## 2018-02-04 LAB
ANION GAP SERPL CALC-SCNC: 9 MMOL/L (ref 6–14)
BLOOD UREA NITROGEN: 78 MG/DL (ref 7–20)
CALCIUM: 8.6 MG/DL (ref 8.5–10.1)
CHLORIDE: 101 MMOL/L (ref 98–107)
CO2 SERPL-SCNC: 27 MMOL/L (ref 21–32)
CREAT SERPL-MCNC: 1 MG/DL (ref 0.6–1)
GFR SERPLBLD BASED ON 1.73 SQ M-ARVRAT: 53.3 ML/MIN
GLUCOSE SERPL-MCNC: 107 MG/DL (ref 70–99)
POTASSIUM SERPL-SCNC: 4.5 MMOL/L (ref 3.5–5.1)
SODIUM: 137 MMOL/L (ref 136–145)

## 2018-02-04 RX ADMIN — ONDANSETRON 1 MG: 4 TABLET, ORALLY DISINTEGRATING ORAL at 08:26

## 2018-02-04 RX ADMIN — METHYLPREDNISOLONE SODIUM SUCCINATE 1 MG: 125 INJECTION, POWDER, FOR SOLUTION INTRAMUSCULAR; INTRAVENOUS at 06:03

## 2018-02-04 RX ADMIN — PANTOPRAZOLE SODIUM 1 MG: 40 TABLET, DELAYED RELEASE ORAL at 06:03

## 2018-02-04 RX ADMIN — VANCOMYCIN HYDROCHLORIDE 1 MG: 500 INJECTION, POWDER, LYOPHILIZED, FOR SOLUTION INTRAVENOUS at 16:58

## 2018-02-04 RX ADMIN — ISOSORBIDE MONONITRATE 1 MG: 30 TABLET, EXTENDED RELEASE ORAL at 08:26

## 2018-02-04 RX ADMIN — VANCOMYCIN HYDROCHLORIDE 1 MG: 500 INJECTION, POWDER, LYOPHILIZED, FOR SOLUTION INTRAVENOUS at 08:25

## 2018-02-04 RX ADMIN — TAMSULOSIN HYDROCHLORIDE 1 MG: 0.4 CAPSULE ORAL at 20:56

## 2018-02-04 RX ADMIN — PANCRELIPASE LIPASE, PANCRELIPASE PROTEASE, PANCRELIPASE AMYLASE 1 CAP: 10000; 32000; 42000 CAPSULE, DELAYED RELEASE ORAL at 13:37

## 2018-02-04 RX ADMIN — Medication 1 CAP: at 20:56

## 2018-02-04 RX ADMIN — ENOXAPARIN SODIUM 1 MG: 100 INJECTION SUBCUTANEOUS at 13:38

## 2018-02-04 RX ADMIN — PANCRELIPASE LIPASE, PANCRELIPASE PROTEASE, PANCRELIPASE AMYLASE 1 CAP: 10000; 32000; 42000 CAPSULE, DELAYED RELEASE ORAL at 08:25

## 2018-02-04 RX ADMIN — METHYLPREDNISOLONE SODIUM SUCCINATE 1 MG: 40 INJECTION, POWDER, FOR SOLUTION INTRAMUSCULAR; INTRAVENOUS at 20:57

## 2018-02-04 RX ADMIN — VANCOMYCIN HYDROCHLORIDE 1 MG: 500 INJECTION, POWDER, LYOPHILIZED, FOR SOLUTION INTRAVENOUS at 20:56

## 2018-02-04 RX ADMIN — METHYLPREDNISOLONE SODIUM SUCCINATE 1 MG: 40 INJECTION, POWDER, FOR SOLUTION INTRAMUSCULAR; INTRAVENOUS at 13:37

## 2018-02-04 RX ADMIN — PANCRELIPASE LIPASE, PANCRELIPASE PROTEASE, PANCRELIPASE AMYLASE 1 CAP: 10000; 32000; 42000 CAPSULE, DELAYED RELEASE ORAL at 16:58

## 2018-02-04 RX ADMIN — VANCOMYCIN HYDROCHLORIDE 1 MG: 500 INJECTION, POWDER, LYOPHILIZED, FOR SOLUTION INTRAVENOUS at 13:37

## 2018-02-04 RX ADMIN — Medication 1 CAP: at 08:25

## 2018-02-05 LAB
ALBUMIN SERPL-MCNC: 3 G/DL (ref 3.4–5)
ALBUMIN/GLOB SERPL: 0.8 {RATIO} (ref 1–1.7)
ALP SERPL-CCNC: 84 U/L (ref 46–116)
ALT (SGPT): 42 U/L (ref 14–59)
ANION GAP SERPL CALC-SCNC: 12 MMOL/L (ref 6–14)
AST SERPL-CCNC: 23 U/L (ref 15–37)
BLOOD UREA NITROGEN: 90 MG/DL (ref 7–20)
BUN/CREAT SERPL: 75 (ref 6–20)
CALCIUM: 9.2 MG/DL (ref 8.5–10.1)
CHLORIDE: 100 MMOL/L (ref 98–107)
CO2 SERPL-SCNC: 25 MMOL/L (ref 21–32)
CREAT SERPL-MCNC: 1.2 MG/DL (ref 0.6–1)
GFR SERPLBLD BASED ON 1.73 SQ M-ARVRAT: 43.2 ML/MIN
GLOBULIN SER-MCNC: 3.9 G/DL (ref 2.2–3.8)
GLUCOSE SERPL-MCNC: 136 MG/DL (ref 70–99)
HCG SERPL-ACNC: 12 X10^3/UL (ref 4–11)
HEMATOCRIT: 43.1 % (ref 36–47)
HEMOGLOBIN: 14.2 G/DL (ref 12–15.5)
MEAN CORPUSCULAR HEMOGLOBIN: 32 PG (ref 25–35)
MEAN CORPUSCULAR HGB CONC: 33 G/DL (ref 31–37)
MEAN CORPUSCULAR VOLUME: 98 FL (ref 79–100)
PLATELET COUNT: 272 X10^3/UL (ref 140–400)
POTASSIUM SERPL-SCNC: 4.7 MMOL/L (ref 3.5–5.1)
RED BLOOD COUNT: 4.43 X10^6/UL (ref 3.5–5.4)
RED CELL DISTRIBUTION WIDTH: 14.1 % (ref 11.5–14.5)
SODIUM: 137 MMOL/L (ref 136–145)
TOTAL BILIRUBIN: 0.4 MG/DL (ref 0.2–1)
TOTAL PROTEIN: 6.9 G/DL (ref 6.4–8.2)

## 2018-02-05 RX ADMIN — VANCOMYCIN HYDROCHLORIDE 1 MG: 500 INJECTION, POWDER, LYOPHILIZED, FOR SOLUTION INTRAVENOUS at 12:38

## 2018-02-05 RX ADMIN — PANCRELIPASE LIPASE, PANCRELIPASE PROTEASE, PANCRELIPASE AMYLASE 1 CAP: 10000; 32000; 42000 CAPSULE, DELAYED RELEASE ORAL at 12:00

## 2018-02-05 RX ADMIN — PANCRELIPASE LIPASE, PANCRELIPASE PROTEASE, PANCRELIPASE AMYLASE 1 CAP: 10000; 32000; 42000 CAPSULE, DELAYED RELEASE ORAL at 18:05

## 2018-02-05 RX ADMIN — TAMSULOSIN HYDROCHLORIDE 1 MG: 0.4 CAPSULE ORAL at 21:09

## 2018-02-05 RX ADMIN — Medication 1 CAP: at 10:08

## 2018-02-05 RX ADMIN — Medication 1 CAP: at 21:09

## 2018-02-05 RX ADMIN — METHYLPREDNISOLONE SODIUM SUCCINATE 1 MG: 40 INJECTION, POWDER, FOR SOLUTION INTRAMUSCULAR; INTRAVENOUS at 15:52

## 2018-02-05 RX ADMIN — METHYLPREDNISOLONE SODIUM SUCCINATE 1 MG: 40 INJECTION, POWDER, FOR SOLUTION INTRAMUSCULAR; INTRAVENOUS at 06:18

## 2018-02-05 RX ADMIN — VANCOMYCIN HYDROCHLORIDE 1 MG: 500 INJECTION, POWDER, LYOPHILIZED, FOR SOLUTION INTRAVENOUS at 10:08

## 2018-02-05 RX ADMIN — METHYLPREDNISOLONE SODIUM SUCCINATE 1 MG: 40 INJECTION, POWDER, FOR SOLUTION INTRAMUSCULAR; INTRAVENOUS at 21:09

## 2018-02-05 RX ADMIN — PANTOPRAZOLE SODIUM 1 MG: 40 TABLET, DELAYED RELEASE ORAL at 06:17

## 2018-02-05 RX ADMIN — ISOSORBIDE MONONITRATE 1 MG: 30 TABLET, EXTENDED RELEASE ORAL at 10:07

## 2018-02-05 RX ADMIN — VANCOMYCIN HYDROCHLORIDE 1 MG: 500 INJECTION, POWDER, LYOPHILIZED, FOR SOLUTION INTRAVENOUS at 21:10

## 2018-02-05 RX ADMIN — VANCOMYCIN HYDROCHLORIDE 1 MG: 500 INJECTION, POWDER, LYOPHILIZED, FOR SOLUTION INTRAVENOUS at 18:04

## 2018-02-05 RX ADMIN — ENOXAPARIN SODIUM 1 MG: 100 INJECTION SUBCUTANEOUS at 12:40

## 2018-02-05 RX ADMIN — PANCRELIPASE LIPASE, PANCRELIPASE PROTEASE, PANCRELIPASE AMYLASE 1 CAP: 10000; 32000; 42000 CAPSULE, DELAYED RELEASE ORAL at 10:08

## 2018-02-06 LAB
ANION GAP SERPL CALC-SCNC: 5 MMOL/L (ref 6–14)
BLOOD UREA NITROGEN: 83 MG/DL (ref 7–20)
CALCIUM: 8.2 MG/DL (ref 8.5–10.1)
CHLORIDE: 98 MMOL/L (ref 98–107)
CO2 SERPL-SCNC: 31 MMOL/L (ref 21–32)
CREAT SERPL-MCNC: 1.2 MG/DL (ref 0.6–1)
GFR SERPLBLD BASED ON 1.73 SQ M-ARVRAT: 43.2 ML/MIN
GLUCOSE SERPL-MCNC: 142 MG/DL (ref 70–99)
HCG SERPL-ACNC: 16 X10^3/UL (ref 4–11)
HEMATOCRIT: 36.5 % (ref 36–47)
HEMOGLOBIN: 12.4 G/DL (ref 12–15.5)
MEAN CORPUSCULAR HEMOGLOBIN: 33 PG (ref 25–35)
MEAN CORPUSCULAR HGB CONC: 34 G/DL (ref 31–37)
MEAN CORPUSCULAR VOLUME: 97 FL (ref 79–100)
PLATELET COUNT: 297 X10^3/UL (ref 140–400)
POTASSIUM SERPL-SCNC: 4.9 MMOL/L (ref 3.5–5.1)
RED BLOOD COUNT: 3.79 X10^6/UL (ref 3.5–5.4)
RED CELL DISTRIBUTION WIDTH: 13.9 % (ref 11.5–14.5)
SODIUM: 134 MMOL/L (ref 136–145)

## 2018-02-06 RX ADMIN — SODIUM CHLORIDE, PRESERVATIVE FREE 1 UNIT: 5 INJECTION INTRAVENOUS at 14:31

## 2018-02-06 RX ADMIN — ONDANSETRON 1 MG: 4 TABLET, ORALLY DISINTEGRATING ORAL at 09:00

## 2018-02-06 RX ADMIN — PANCRELIPASE LIPASE, PANCRELIPASE PROTEASE, PANCRELIPASE AMYLASE 1 CAP: 10000; 32000; 42000 CAPSULE, DELAYED RELEASE ORAL at 11:54

## 2018-02-06 RX ADMIN — VANCOMYCIN HYDROCHLORIDE 1 MG: 500 INJECTION, POWDER, LYOPHILIZED, FOR SOLUTION INTRAVENOUS at 09:00

## 2018-02-06 RX ADMIN — METHYLPREDNISOLONE SODIUM SUCCINATE 1 MG: 40 INJECTION, POWDER, FOR SOLUTION INTRAMUSCULAR; INTRAVENOUS at 11:55

## 2018-02-06 RX ADMIN — VANCOMYCIN HYDROCHLORIDE 1 MG: 500 INJECTION, POWDER, LYOPHILIZED, FOR SOLUTION INTRAVENOUS at 11:54

## 2018-02-06 RX ADMIN — PANCRELIPASE LIPASE, PANCRELIPASE PROTEASE, PANCRELIPASE AMYLASE 1 CAP: 10000; 32000; 42000 CAPSULE, DELAYED RELEASE ORAL at 09:00

## 2018-02-06 RX ADMIN — FENTANYL 1 PATCH: 50 PATCH, EXTENDED RELEASE TRANSDERMAL at 09:00

## 2018-02-06 RX ADMIN — METHYLPREDNISOLONE SODIUM SUCCINATE 1 MG: 40 INJECTION, POWDER, FOR SOLUTION INTRAMUSCULAR; INTRAVENOUS at 05:48

## 2018-02-06 RX ADMIN — PANTOPRAZOLE SODIUM 1 MG: 40 TABLET, DELAYED RELEASE ORAL at 05:48

## 2018-02-06 RX ADMIN — ENOXAPARIN SODIUM 1 MG: 100 INJECTION SUBCUTANEOUS at 11:55

## 2018-02-06 RX ADMIN — Medication 1 APP: at 08:59

## 2018-02-06 RX ADMIN — Medication 1 CAP: at 09:00

## 2018-02-06 RX ADMIN — ISOSORBIDE MONONITRATE 1 MG: 30 TABLET, EXTENDED RELEASE ORAL at 09:01

## 2018-02-08 ENCOUNTER — HOSPITAL ENCOUNTER (INPATIENT)
Dept: HOSPITAL 61 - ER | Age: 78
LOS: 5 days | Discharge: HOME | DRG: 871 | End: 2018-02-13
Attending: FAMILY MEDICINE | Admitting: FAMILY MEDICINE
Payer: MEDICARE

## 2018-02-08 DIAGNOSIS — T50.2X5A: ICD-10-CM

## 2018-02-08 DIAGNOSIS — Z90.710: ICD-10-CM

## 2018-02-08 DIAGNOSIS — E87.5: ICD-10-CM

## 2018-02-08 DIAGNOSIS — Z82.49: ICD-10-CM

## 2018-02-08 DIAGNOSIS — K59.00: ICD-10-CM

## 2018-02-08 DIAGNOSIS — Z87.891: ICD-10-CM

## 2018-02-08 DIAGNOSIS — F41.9: ICD-10-CM

## 2018-02-08 DIAGNOSIS — Z90.49: ICD-10-CM

## 2018-02-08 DIAGNOSIS — M54.9: ICD-10-CM

## 2018-02-08 DIAGNOSIS — Z51.5: ICD-10-CM

## 2018-02-08 DIAGNOSIS — I25.119: ICD-10-CM

## 2018-02-08 DIAGNOSIS — Z88.8: ICD-10-CM

## 2018-02-08 DIAGNOSIS — E43: ICD-10-CM

## 2018-02-08 DIAGNOSIS — E86.0: ICD-10-CM

## 2018-02-08 DIAGNOSIS — F03.90: ICD-10-CM

## 2018-02-08 DIAGNOSIS — N39.0: ICD-10-CM

## 2018-02-08 DIAGNOSIS — Z88.5: ICD-10-CM

## 2018-02-08 DIAGNOSIS — Z96.659: ICD-10-CM

## 2018-02-08 DIAGNOSIS — Z95.5: ICD-10-CM

## 2018-02-08 DIAGNOSIS — Z66: ICD-10-CM

## 2018-02-08 DIAGNOSIS — A41.9: Primary | ICD-10-CM

## 2018-02-08 DIAGNOSIS — I13.0: ICD-10-CM

## 2018-02-08 DIAGNOSIS — J44.9: ICD-10-CM

## 2018-02-08 DIAGNOSIS — Z88.1: ICD-10-CM

## 2018-02-08 DIAGNOSIS — N17.9: ICD-10-CM

## 2018-02-08 DIAGNOSIS — I25.2: ICD-10-CM

## 2018-02-08 DIAGNOSIS — E78.5: ICD-10-CM

## 2018-02-08 DIAGNOSIS — I73.9: ICD-10-CM

## 2018-02-08 DIAGNOSIS — I50.9: ICD-10-CM

## 2018-02-08 DIAGNOSIS — N18.9: ICD-10-CM

## 2018-02-08 DIAGNOSIS — K21.9: ICD-10-CM

## 2018-02-08 LAB
% BANDS: 1 % (ref 0–9)
% MONOS: 7 % (ref 0–10)
% SEGS: 92 % (ref 35–66)
ADD MAN DIFF?: YES
ALBUMIN SERPL-MCNC: 2.5 G/DL (ref 3.4–5)
ALBUMIN/GLOB SERPL: 0.8 {RATIO} (ref 1–1.7)
ALP SERPL-CCNC: 73 U/L (ref 46–116)
ALT (SGPT): 28 U/L (ref 14–59)
ANION GAP SERPL CALC-SCNC: 8 MMOL/L (ref 6–14)
AST SERPL-CCNC: 20 U/L (ref 15–37)
BACTERIA #/AREA URNS HPF: (no result) /HPF
BASO #: 0 X10^3/UL (ref 0–0.2)
BASO %: 0 % (ref 0–3)
BILIRUBIN,URINE: NEGATIVE
BLOOD UREA NITROGEN: 72 MG/DL (ref 7–20)
BUN/CREAT SERPL: 51 (ref 6–20)
CALCIUM: 8.4 MG/DL (ref 8.5–10.1)
CHLORIDE: 100 MMOL/L (ref 98–107)
CLARITY,URINE: CLEAR
CO2 SERPL-SCNC: 29 MMOL/L (ref 21–32)
COLOR,URINE: YELLOW
CREAT SERPL-MCNC: 1.4 MG/DL (ref 0.6–1)
EOS #: 0 X10^3/UL (ref 0–0.7)
EOS %: 0 % (ref 0–3)
GFR SERPLBLD BASED ON 1.73 SQ M-ARVRAT: 36.2 ML/MIN
GLOBULIN SER-MCNC: 3 G/DL (ref 2.2–3.8)
GLUCOSE SERPL-MCNC: 121 MG/DL (ref 70–99)
GLUCOSE,URINE: NEGATIVE MG/DL
HCG SERPL-ACNC: 17.3 X10^3/UL (ref 4–11)
HEMATOCRIT: 31.2 % (ref 36–47)
HEMOGLOBIN: 10.7 G/DL (ref 12–15.5)
LYMPH #: 0.1 X10^3/UL (ref 1–4.8)
LYMPH %: 1 % (ref 24–48)
MEAN CORPUSCULAR HEMOGLOBIN: 33 PG (ref 25–35)
MEAN CORPUSCULAR HGB CONC: 34 G/DL (ref 31–37)
MEAN CORPUSCULAR VOLUME: 96 FL (ref 79–100)
MONO #: 0.7 X10^3/UL (ref 0–1.1)
MONO %: 4 % (ref 0–9)
NEUT #: 16.5 X10^3UL (ref 1.8–7.7)
NEUT %: 96 % (ref 31–73)
NITRITE UR QL STRIP: POSITIVE
PH,URINE: 7.5
PLATELET COUNT: 263 X10^3/UL (ref 140–400)
PLT ESTIMATE: ADEQUATE
POTASSIUM SERPL-SCNC: 5.5 MMOL/L (ref 3.5–5.1)
PROTEIN,URINE: NEGATIVE MG/DL
RBC,URINE: 0 /HPF (ref 0–2)
RED BLOOD COUNT: 3.24 X10^6/UL (ref 3.5–5.4)
RED CELL DISTRIBUTION WIDTH: 13.6 % (ref 11.5–14.5)
SODIUM: 137 MMOL/L (ref 136–145)
SPECIFIC GRAVITY,URINE: 1.01
THYROID STIM HORMONE (TSH): 0.45 UIU/ML (ref 0.36–3.74)
TOTAL BILIRUBIN: 0.4 MG/DL (ref 0.2–1)
TOTAL PROTEIN: 5.5 G/DL (ref 6.4–8.2)
TROPONINI: 0.03 NG/ML (ref 0–0.06)
UROBILINOGEN,URINE: 0.2 MG/DL
WBC #/AREA URNS HPF: (no result) /HPF (ref 0–4)

## 2018-02-08 PROCEDURE — 84443 ASSAY THYROID STIM HORMONE: CPT

## 2018-02-08 PROCEDURE — 80048 BASIC METABOLIC PNL TOTAL CA: CPT

## 2018-02-08 PROCEDURE — 80053 COMPREHEN METABOLIC PANEL: CPT

## 2018-02-08 PROCEDURE — 36415 COLL VENOUS BLD VENIPUNCTURE: CPT

## 2018-02-08 PROCEDURE — 81001 URINALYSIS AUTO W/SCOPE: CPT

## 2018-02-08 PROCEDURE — 83735 ASSAY OF MAGNESIUM: CPT

## 2018-02-08 PROCEDURE — 93005 ELECTROCARDIOGRAM TRACING: CPT

## 2018-02-08 PROCEDURE — 82553 CREATINE MB FRACTION: CPT

## 2018-02-08 PROCEDURE — 84100 ASSAY OF PHOSPHORUS: CPT

## 2018-02-08 PROCEDURE — 87186 SC STD MICRODIL/AGAR DIL: CPT

## 2018-02-08 PROCEDURE — 99285 EMERGENCY DEPT VISIT HI MDM: CPT

## 2018-02-08 PROCEDURE — 84484 ASSAY OF TROPONIN QUANT: CPT

## 2018-02-08 PROCEDURE — 85025 COMPLETE CBC W/AUTO DIFF WBC: CPT

## 2018-02-08 PROCEDURE — 71045 X-RAY EXAM CHEST 1 VIEW: CPT

## 2018-02-08 PROCEDURE — 94760 N-INVAS EAR/PLS OXIMETRY 1: CPT

## 2018-02-08 PROCEDURE — 87086 URINE CULTURE/COLONY COUNT: CPT

## 2018-02-08 PROCEDURE — 96360 HYDRATION IV INFUSION INIT: CPT

## 2018-02-08 PROCEDURE — 85007 BL SMEAR W/DIFF WBC COUNT: CPT

## 2018-02-08 PROCEDURE — 94640 AIRWAY INHALATION TREATMENT: CPT

## 2018-02-08 PROCEDURE — 87641 MR-STAPH DNA AMP PROBE: CPT

## 2018-02-08 PROCEDURE — 97162 PT EVAL MOD COMPLEX 30 MIN: CPT

## 2018-02-08 RX ADMIN — BACITRACIN 1 MLS/HR: 5000 INJECTION, POWDER, FOR SOLUTION INTRAMUSCULAR at 19:34

## 2018-02-08 RX ADMIN — BACITRACIN 1 MLS/HR: 5000 INJECTION, POWDER, FOR SOLUTION INTRAMUSCULAR at 22:34

## 2018-02-08 RX ADMIN — BACITRACIN 1 MLS/HR: 5000 INJECTION, POWDER, FOR SOLUTION INTRAMUSCULAR at 17:32

## 2018-02-08 RX ADMIN — BACITRACIN 1 MLS/HR: 5000 INJECTION, POWDER, FOR SOLUTION INTRAMUSCULAR at 21:36

## 2018-02-08 RX ADMIN — BACITRACIN 1 MLS/HR: 5000 INJECTION, POWDER, FOR SOLUTION INTRAMUSCULAR at 23:38

## 2018-02-08 RX ADMIN — BACITRACIN 1 MLS/HR: 5000 INJECTION, POWDER, FOR SOLUTION INTRAMUSCULAR at 18:33

## 2018-02-08 RX ADMIN — BACITRACIN 1 MLS/HR: 5000 INJECTION, POWDER, FOR SOLUTION INTRAMUSCULAR at 20:35

## 2018-02-08 RX ADMIN — BACITRACIN 1 MLS/HR: 5000 INJECTION, POWDER, FOR SOLUTION INTRAMUSCULAR at 18:24

## 2018-02-08 RX ADMIN — BACITRACIN 1 MLS/HR: 5000 INJECTION, POWDER, FOR SOLUTION INTRAMUSCULAR at 15:30

## 2018-02-08 RX ADMIN — BACITRACIN 1 MLS/HR: 5000 INJECTION, POWDER, FOR SOLUTION INTRAMUSCULAR at 22:37

## 2018-02-08 RX ADMIN — BACITRACIN 1 MLS/HR: 5000 INJECTION, POWDER, FOR SOLUTION INTRAMUSCULAR at 16:31

## 2018-02-08 RX ADMIN — CEFTRIAXONE 1 GM: 1 INJECTION, POWDER, FOR SOLUTION INTRAMUSCULAR; INTRAVENOUS at 20:44

## 2018-02-09 LAB
ANION GAP SERPL CALC-SCNC: 10 MMOL/L (ref 6–14)
BLOOD UREA NITROGEN: 57 MG/DL (ref 7–20)
CALCIUM: 8.2 MG/DL (ref 8.5–10.1)
CHLORIDE: 105 MMOL/L (ref 98–107)
CO2 SERPL-SCNC: 24 MMOL/L (ref 21–32)
CREAT SERPL-MCNC: 1.1 MG/DL (ref 0.6–1)
GFR SERPLBLD BASED ON 1.73 SQ M-ARVRAT: 47.8 ML/MIN
GLUCOSE SERPL-MCNC: 79 MG/DL (ref 70–99)
POTASSIUM SERPL-SCNC: 4.7 MMOL/L (ref 3.5–5.1)
SODIUM: 139 MMOL/L (ref 136–145)

## 2018-02-09 RX ADMIN — IPRATROPIUM BROMIDE AND ALBUTEROL SULFATE 1 ML: .5; 3 SOLUTION RESPIRATORY (INHALATION) at 08:00

## 2018-02-09 RX ADMIN — BACITRACIN 1 MLS/HR: 5000 INJECTION, POWDER, FOR SOLUTION INTRAMUSCULAR at 03:42

## 2018-02-09 RX ADMIN — IPRATROPIUM BROMIDE AND ALBUTEROL SULFATE 1 ML: .5; 3 SOLUTION RESPIRATORY (INHALATION) at 15:19

## 2018-02-09 RX ADMIN — BACITRACIN 1 MLS/HR: 5000 INJECTION, POWDER, FOR SOLUTION INTRAMUSCULAR at 01:40

## 2018-02-09 RX ADMIN — IPRATROPIUM BROMIDE AND ALBUTEROL SULFATE 1 ML: .5; 3 SOLUTION RESPIRATORY (INHALATION) at 09:45

## 2018-02-09 RX ADMIN — IPRATROPIUM BROMIDE AND ALBUTEROL SULFATE 1 ML: .5; 3 SOLUTION RESPIRATORY (INHALATION) at 19:36

## 2018-02-09 RX ADMIN — BACITRACIN 1 MLS/HR: 5000 INJECTION, POWDER, FOR SOLUTION INTRAMUSCULAR at 04:43

## 2018-02-09 RX ADMIN — Medication 1 CAP: at 21:26

## 2018-02-09 RX ADMIN — CEFTRIAXONE 1 GM: 1 INJECTION, POWDER, FOR SOLUTION INTRAMUSCULAR; INTRAVENOUS at 22:38

## 2018-02-09 RX ADMIN — BACITRACIN 1 MLS/HR: 5000 INJECTION, POWDER, FOR SOLUTION INTRAMUSCULAR at 02:41

## 2018-02-09 RX ADMIN — PANCRELIPASE LIPASE, PANCRELIPASE PROTEASE, PANCRELIPASE AMYLASE 1 CAP: 10000; 32000; 42000 CAPSULE, DELAYED RELEASE ORAL at 12:08

## 2018-02-09 RX ADMIN — ALTEPLASE 1 MG: 2.2 INJECTION, POWDER, LYOPHILIZED, FOR SOLUTION INTRAVENOUS at 12:47

## 2018-02-09 RX ADMIN — METOPROLOL TARTRATE 1 MG: 25 TABLET ORAL at 21:27

## 2018-02-09 RX ADMIN — IPRATROPIUM BROMIDE AND ALBUTEROL SULFATE 1 ML: .5; 3 SOLUTION RESPIRATORY (INHALATION) at 03:44

## 2018-02-09 RX ADMIN — FUROSEMIDE 1 MG: 20 TABLET ORAL at 10:15

## 2018-02-09 RX ADMIN — BACITRACIN 1 MLS/HR: 5000 INJECTION, POWDER, FOR SOLUTION INTRAMUSCULAR at 05:44

## 2018-02-09 RX ADMIN — METOPROLOL TARTRATE 1 MG: 25 TABLET ORAL at 10:14

## 2018-02-09 RX ADMIN — BACITRACIN 1 MLS/HR: 5000 INJECTION, POWDER, FOR SOLUTION INTRAMUSCULAR at 00:39

## 2018-02-09 RX ADMIN — PANTOPRAZOLE SODIUM 1 MG: 40 TABLET, DELAYED RELEASE ORAL at 10:14

## 2018-02-09 RX ADMIN — FENTANYL 1 PATCH: 50 PATCH, EXTENDED RELEASE TRANSDERMAL at 10:16

## 2018-02-09 RX ADMIN — ALTEPLASE 1 MG: 2.2 INJECTION, POWDER, LYOPHILIZED, FOR SOLUTION INTRAVENOUS at 10:13

## 2018-02-09 RX ADMIN — BACITRACIN 1 MLS/HR: 5000 INJECTION, POWDER, FOR SOLUTION INTRAMUSCULAR at 08:37

## 2018-02-09 RX ADMIN — PANCRELIPASE LIPASE, PANCRELIPASE PROTEASE, PANCRELIPASE AMYLASE 1 CAP: 10000; 32000; 42000 CAPSULE, DELAYED RELEASE ORAL at 16:56

## 2018-02-09 RX ADMIN — IPRATROPIUM BROMIDE AND ALBUTEROL SULFATE 1 ML: .5; 3 SOLUTION RESPIRATORY (INHALATION) at 11:39

## 2018-02-09 RX ADMIN — APIXABAN 1 MG: 2.5 TABLET, FILM COATED ORAL at 10:14

## 2018-02-09 RX ADMIN — OXYCODONE HYDROCHLORIDE AND ACETAMINOPHEN 1 TAB: 5; 325 TABLET ORAL at 16:56

## 2018-02-09 RX ADMIN — APIXABAN 1 MG: 2.5 TABLET, FILM COATED ORAL at 21:27

## 2018-02-09 RX ADMIN — TAMSULOSIN HYDROCHLORIDE 1 MG: 0.4 CAPSULE ORAL at 21:27

## 2018-02-09 RX ADMIN — Medication 1 CAP: at 10:14

## 2018-02-10 LAB
ADD MAN DIFF?: NO
ANION GAP SERPL CALC-SCNC: 10 MMOL/L (ref 6–14)
BASO #: 0.1 X10^3/UL (ref 0–0.2)
BASO %: 1 % (ref 0–3)
BLOOD UREA NITROGEN: 53 MG/DL (ref 7–20)
CALCIUM: 8.4 MG/DL (ref 8.5–10.1)
CHLORIDE: 103 MMOL/L (ref 98–107)
CO2 SERPL-SCNC: 24 MMOL/L (ref 21–32)
CREAT SERPL-MCNC: 1.1 MG/DL (ref 0.6–1)
EOS #: 0 X10^3/UL (ref 0–0.7)
EOS %: 0 % (ref 0–3)
GFR SERPLBLD BASED ON 1.73 SQ M-ARVRAT: 47.8 ML/MIN
GLUCOSE SERPL-MCNC: 144 MG/DL (ref 70–99)
HCG SERPL-ACNC: 15.5 X10^3/UL (ref 4–11)
HEMATOCRIT: 34 % (ref 36–47)
HEMOGLOBIN: 11.3 G/DL (ref 12–15.5)
LYMPH #: 0.1 X10^3/UL (ref 1–4.8)
LYMPH %: 1 % (ref 24–48)
MEAN CORPUSCULAR HEMOGLOBIN: 32 PG (ref 25–35)
MEAN CORPUSCULAR HGB CONC: 33 G/DL (ref 31–37)
MEAN CORPUSCULAR VOLUME: 97 FL (ref 79–100)
MONO #: 0.8 X10^3/UL (ref 0–1.1)
MONO %: 6 % (ref 0–9)
NEUT #: 14.4 X10^3UL (ref 1.8–7.7)
NEUT %: 93 % (ref 31–73)
PLATELET COUNT: 284 X10^3/UL (ref 140–400)
POTASSIUM SERPL-SCNC: 4.2 MMOL/L (ref 3.5–5.1)
RED BLOOD COUNT: 3.5 X10^6/UL (ref 3.5–5.4)
RED CELL DISTRIBUTION WIDTH: 14.2 % (ref 11.5–14.5)
SODIUM: 137 MMOL/L (ref 136–145)

## 2018-02-10 RX ADMIN — METOPROLOL TARTRATE 1 MG: 25 TABLET ORAL at 20:56

## 2018-02-10 RX ADMIN — IPRATROPIUM BROMIDE AND ALBUTEROL SULFATE 1 ML: .5; 3 SOLUTION RESPIRATORY (INHALATION) at 23:30

## 2018-02-10 RX ADMIN — ONDANSETRON 1 MG: 4 TABLET, ORALLY DISINTEGRATING ORAL at 21:04

## 2018-02-10 RX ADMIN — CEFTRIAXONE 1 GM: 1 INJECTION, POWDER, FOR SOLUTION INTRAMUSCULAR; INTRAVENOUS at 20:58

## 2018-02-10 RX ADMIN — APIXABAN 1 MG: 5 TABLET, FILM COATED ORAL at 08:38

## 2018-02-10 RX ADMIN — IPRATROPIUM BROMIDE AND ALBUTEROL SULFATE 1 ML: .5; 3 SOLUTION RESPIRATORY (INHALATION) at 11:04

## 2018-02-10 RX ADMIN — PANTOPRAZOLE SODIUM 1 MG: 40 TABLET, DELAYED RELEASE ORAL at 07:40

## 2018-02-10 RX ADMIN — Medication 1 CAP: at 20:55

## 2018-02-10 RX ADMIN — IPRATROPIUM BROMIDE AND ALBUTEROL SULFATE 1 ML: .5; 3 SOLUTION RESPIRATORY (INHALATION) at 00:44

## 2018-02-10 RX ADMIN — Medication 1 CAP: at 08:38

## 2018-02-10 RX ADMIN — METOPROLOL TARTRATE 1 MG: 25 TABLET ORAL at 08:39

## 2018-02-10 RX ADMIN — IPRATROPIUM BROMIDE AND ALBUTEROL SULFATE 1 ML: .5; 3 SOLUTION RESPIRATORY (INHALATION) at 16:02

## 2018-02-10 RX ADMIN — PANCRELIPASE LIPASE, PANCRELIPASE PROTEASE, PANCRELIPASE AMYLASE 1 CAP: 10000; 32000; 42000 CAPSULE, DELAYED RELEASE ORAL at 17:08

## 2018-02-10 RX ADMIN — Medication 1 EACH: at 08:15

## 2018-02-10 RX ADMIN — ONDANSETRON 1 MG: 4 TABLET, ORALLY DISINTEGRATING ORAL at 10:46

## 2018-02-10 RX ADMIN — TAMSULOSIN HYDROCHLORIDE 1 MG: 0.4 CAPSULE ORAL at 20:56

## 2018-02-10 RX ADMIN — FUROSEMIDE 1 MG: 20 TABLET ORAL at 08:38

## 2018-02-10 RX ADMIN — PANCRELIPASE LIPASE, PANCRELIPASE PROTEASE, PANCRELIPASE AMYLASE 1 CAP: 10000; 32000; 42000 CAPSULE, DELAYED RELEASE ORAL at 11:34

## 2018-02-10 RX ADMIN — OXYCODONE HYDROCHLORIDE AND ACETAMINOPHEN 1 TAB: 5; 325 TABLET ORAL at 23:41

## 2018-02-10 RX ADMIN — IPRATROPIUM BROMIDE AND ALBUTEROL SULFATE 1 ML: .5; 3 SOLUTION RESPIRATORY (INHALATION) at 19:23

## 2018-02-10 RX ADMIN — DEXTROSE AND SODIUM CHLORIDE 1 MLS/HR: 5; .45 INJECTION, SOLUTION INTRAVENOUS at 14:35

## 2018-02-10 RX ADMIN — OXYCODONE HYDROCHLORIDE AND ACETAMINOPHEN 1 TAB: 5; 325 TABLET ORAL at 08:41

## 2018-02-10 RX ADMIN — IPRATROPIUM BROMIDE AND ALBUTEROL SULFATE 1 ML: .5; 3 SOLUTION RESPIRATORY (INHALATION) at 07:55

## 2018-02-10 RX ADMIN — APIXABAN 1 MG: 5 TABLET, FILM COATED ORAL at 20:55

## 2018-02-10 RX ADMIN — IPRATROPIUM BROMIDE AND ALBUTEROL SULFATE 1 ML: .5; 3 SOLUTION RESPIRATORY (INHALATION) at 04:04

## 2018-02-10 RX ADMIN — OXYCODONE HYDROCHLORIDE AND ACETAMINOPHEN 1 TAB: 5; 325 TABLET ORAL at 14:34

## 2018-02-10 RX ADMIN — PANCRELIPASE LIPASE, PANCRELIPASE PROTEASE, PANCRELIPASE AMYLASE 1 CAP: 10000; 32000; 42000 CAPSULE, DELAYED RELEASE ORAL at 08:38

## 2018-02-11 LAB
% MONOS: 5 % (ref 0–10)
% SEGS: 95 % (ref 35–66)
ADD MAN DIFF?: YES
ALBUMIN SERPL-MCNC: 2.5 G/DL (ref 3.4–5)
ALBUMIN/GLOB SERPL: 0.7 {RATIO} (ref 1–1.7)
ALP SERPL-CCNC: 81 U/L (ref 46–116)
ALT (SGPT): 25 U/L (ref 14–59)
ANION GAP SERPL CALC-SCNC: 11 MMOL/L (ref 6–14)
AST SERPL-CCNC: 16 U/L (ref 15–37)
BASO #: 0 X10^3/UL (ref 0–0.2)
BASO %: 0 % (ref 0–3)
BLOOD UREA NITROGEN: 46 MG/DL (ref 7–20)
BUN/CREAT SERPL: 38 (ref 6–20)
CALCIUM: 8.7 MG/DL (ref 8.5–10.1)
CHLORIDE: 101 MMOL/L (ref 98–107)
CK SERPL-CCNC: 36 U/L (ref 26–192)
CKMB INDEX: (no result) % (ref 0–4)
CKMB MASS: 2.1 NG/ML (ref 0–3.6)
CO2 SERPL-SCNC: 24 MMOL/L (ref 21–32)
CREAT SERPL-MCNC: 1.2 MG/DL (ref 0.6–1)
EOS #: 0 X10^3/UL (ref 0–0.7)
EOS %: 0 % (ref 0–3)
GFR SERPLBLD BASED ON 1.73 SQ M-ARVRAT: 43.2 ML/MIN
GLOBULIN SER-MCNC: 3.7 G/DL (ref 2.2–3.8)
GLUCOSE SERPL-MCNC: 162 MG/DL (ref 70–99)
HCG SERPL-ACNC: 14.3 X10^3/UL (ref 4–11)
HEMATOCRIT: 37 % (ref 36–47)
HEMOGLOBIN: 12.3 G/DL (ref 12–15.5)
LYMPH #: 0.1 X10^3/UL (ref 1–4.8)
LYMPH %: 1 % (ref 24–48)
MAGNESIUM: 1.7 MG/DL (ref 1.8–2.4)
MEAN CORPUSCULAR HEMOGLOBIN: 32 PG (ref 25–35)
MEAN CORPUSCULAR HGB CONC: 33 G/DL (ref 31–37)
MEAN CORPUSCULAR VOLUME: 97 FL (ref 79–100)
MONO #: 0.8 X10^3/UL (ref 0–1.1)
MONO %: 6 % (ref 0–9)
NEUT #: 13.4 X10^3UL (ref 1.8–7.7)
NEUT %: 93 % (ref 31–73)
PHOSPHORUS: 2.8 MG/DL (ref 2.6–4.7)
PLATELET COUNT: 326 X10^3/UL (ref 140–400)
PLT ESTIMATE: ADEQUATE
POTASSIUM SERPL-SCNC: 4.6 MMOL/L (ref 3.5–5.1)
RED BLOOD COUNT: 3.84 X10^6/UL (ref 3.5–5.4)
RED CELL DISTRIBUTION WIDTH: 14.3 % (ref 11.5–14.5)
SODIUM: 136 MMOL/L (ref 136–145)
TOTAL BILIRUBIN: 0.3 MG/DL (ref 0.2–1)
TOTAL PROTEIN: 6.2 G/DL (ref 6.4–8.2)
TROPONINI: 0.05 NG/ML (ref 0–0.06)

## 2018-02-11 RX ADMIN — APIXABAN 1 MG: 5 TABLET, FILM COATED ORAL at 22:16

## 2018-02-11 RX ADMIN — APIXABAN 1 MG: 5 TABLET, FILM COATED ORAL at 09:14

## 2018-02-11 RX ADMIN — METOPROLOL TARTRATE 1 MG: 25 TABLET ORAL at 22:15

## 2018-02-11 RX ADMIN — Medication 1 CAP: at 09:14

## 2018-02-11 RX ADMIN — TAMSULOSIN HYDROCHLORIDE 1 MG: 0.4 CAPSULE ORAL at 22:15

## 2018-02-11 RX ADMIN — IPRATROPIUM BROMIDE AND ALBUTEROL SULFATE 1 ML: .5; 3 SOLUTION RESPIRATORY (INHALATION) at 23:41

## 2018-02-11 RX ADMIN — DEXTROSE AND SODIUM CHLORIDE 1 MLS/HR: 5; .45 INJECTION, SOLUTION INTRAVENOUS at 03:55

## 2018-02-11 RX ADMIN — PANTOPRAZOLE SODIUM 1 MG: 40 TABLET, DELAYED RELEASE ORAL at 08:13

## 2018-02-11 RX ADMIN — Medication 1 EACH: at 08:38

## 2018-02-11 RX ADMIN — ONDANSETRON 1 MG: 4 TABLET, ORALLY DISINTEGRATING ORAL at 05:32

## 2018-02-11 RX ADMIN — Medication 1 CAP: at 22:15

## 2018-02-11 RX ADMIN — CEFPODOXIME PROXETIL 1 MG: 100 TABLET, FILM COATED ORAL at 22:14

## 2018-02-11 RX ADMIN — IPRATROPIUM BROMIDE AND ALBUTEROL SULFATE 1 ML: .5; 3 SOLUTION RESPIRATORY (INHALATION) at 07:18

## 2018-02-11 RX ADMIN — NITROGLYCERIN 1 MG: 0.4 TABLET SUBLINGUAL at 21:33

## 2018-02-11 RX ADMIN — OXYCODONE HYDROCHLORIDE AND ACETAMINOPHEN 1 TAB: 5; 325 TABLET ORAL at 05:31

## 2018-02-11 RX ADMIN — IPRATROPIUM BROMIDE AND ALBUTEROL SULFATE 1 ML: .5; 3 SOLUTION RESPIRATORY (INHALATION) at 03:32

## 2018-02-11 RX ADMIN — PANCRELIPASE LIPASE, PANCRELIPASE PROTEASE, PANCRELIPASE AMYLASE 1 CAP: 10000; 32000; 42000 CAPSULE, DELAYED RELEASE ORAL at 17:47

## 2018-02-11 RX ADMIN — IPRATROPIUM BROMIDE AND ALBUTEROL SULFATE 1 ML: .5; 3 SOLUTION RESPIRATORY (INHALATION) at 19:37

## 2018-02-11 RX ADMIN — DEXTROSE AND SODIUM CHLORIDE 1 MLS/HR: 5; .45 INJECTION, SOLUTION INTRAVENOUS at 15:49

## 2018-02-11 RX ADMIN — NITROGLYCERIN 1 INCH: 20 OINTMENT TOPICAL at 22:16

## 2018-02-11 RX ADMIN — IPRATROPIUM BROMIDE AND ALBUTEROL SULFATE 1 ML: .5; 3 SOLUTION RESPIRATORY (INHALATION) at 15:55

## 2018-02-11 RX ADMIN — OXYCODONE HYDROCHLORIDE AND ACETAMINOPHEN 1 TAB: 5; 325 TABLET ORAL at 14:48

## 2018-02-11 RX ADMIN — IPRATROPIUM BROMIDE AND ALBUTEROL SULFATE 1 ML: .5; 3 SOLUTION RESPIRATORY (INHALATION) at 12:47

## 2018-02-11 RX ADMIN — PANCRELIPASE LIPASE, PANCRELIPASE PROTEASE, PANCRELIPASE AMYLASE 1 CAP: 10000; 32000; 42000 CAPSULE, DELAYED RELEASE ORAL at 08:13

## 2018-02-11 RX ADMIN — METOPROLOL TARTRATE 1 MG: 25 TABLET ORAL at 09:00

## 2018-02-11 RX ADMIN — NITROGLYCERIN 1 MG: 0.4 TABLET SUBLINGUAL at 21:28

## 2018-02-11 RX ADMIN — PANCRELIPASE LIPASE, PANCRELIPASE PROTEASE, PANCRELIPASE AMYLASE 1 CAP: 10000; 32000; 42000 CAPSULE, DELAYED RELEASE ORAL at 12:23

## 2018-02-11 RX ADMIN — ONDANSETRON 1 MG: 4 TABLET, ORALLY DISINTEGRATING ORAL at 16:09

## 2018-02-12 RX ADMIN — OXYCODONE HYDROCHLORIDE AND ACETAMINOPHEN 1 TAB: 5; 325 TABLET ORAL at 21:10

## 2018-02-12 RX ADMIN — IPRATROPIUM BROMIDE AND ALBUTEROL SULFATE 1 ML: .5; 3 SOLUTION RESPIRATORY (INHALATION) at 23:13

## 2018-02-12 RX ADMIN — METOPROLOL TARTRATE 1 MG: 25 TABLET ORAL at 09:22

## 2018-02-12 RX ADMIN — PANCRELIPASE LIPASE, PANCRELIPASE PROTEASE, PANCRELIPASE AMYLASE 1 CAP: 10000; 32000; 42000 CAPSULE, DELAYED RELEASE ORAL at 12:29

## 2018-02-12 RX ADMIN — APIXABAN 1 MG: 5 TABLET, FILM COATED ORAL at 09:21

## 2018-02-12 RX ADMIN — APIXABAN 1 MG: 5 TABLET, FILM COATED ORAL at 21:10

## 2018-02-12 RX ADMIN — PANCRELIPASE LIPASE, PANCRELIPASE PROTEASE, PANCRELIPASE AMYLASE 1 CAP: 10000; 32000; 42000 CAPSULE, DELAYED RELEASE ORAL at 08:03

## 2018-02-12 RX ADMIN — NITROGLYCERIN 1 INCH: 20 OINTMENT TOPICAL at 19:24

## 2018-02-12 RX ADMIN — FENTANYL 1 PATCH: 50 PATCH, EXTENDED RELEASE TRANSDERMAL at 09:22

## 2018-02-12 RX ADMIN — IPRATROPIUM BROMIDE AND ALBUTEROL SULFATE 1 ML: .5; 3 SOLUTION RESPIRATORY (INHALATION) at 12:39

## 2018-02-12 RX ADMIN — TAMSULOSIN HYDROCHLORIDE 1 MG: 0.4 CAPSULE ORAL at 21:10

## 2018-02-12 RX ADMIN — NITROGLYCERIN 1 MG: 0.4 TABLET SUBLINGUAL at 05:44

## 2018-02-12 RX ADMIN — Medication 1 CAP: at 09:21

## 2018-02-12 RX ADMIN — IPRATROPIUM BROMIDE AND ALBUTEROL SULFATE 1 ML: .5; 3 SOLUTION RESPIRATORY (INHALATION) at 03:32

## 2018-02-12 RX ADMIN — NITROGLYCERIN 1 INCH: 20 OINTMENT TOPICAL at 14:00

## 2018-02-12 RX ADMIN — IPRATROPIUM BROMIDE AND ALBUTEROL SULFATE 1 ML: .5; 3 SOLUTION RESPIRATORY (INHALATION) at 19:10

## 2018-02-12 RX ADMIN — IPRATROPIUM BROMIDE AND ALBUTEROL SULFATE 1 ML: .5; 3 SOLUTION RESPIRATORY (INHALATION) at 08:52

## 2018-02-12 RX ADMIN — DEXTROSE AND SODIUM CHLORIDE 1 MLS/HR: 5; .45 INJECTION, SOLUTION INTRAVENOUS at 05:58

## 2018-02-12 RX ADMIN — IPRATROPIUM BROMIDE AND ALBUTEROL SULFATE 1 ML: .5; 3 SOLUTION RESPIRATORY (INHALATION) at 16:10

## 2018-02-12 RX ADMIN — NITROGLYCERIN 1 INCH: 20 OINTMENT TOPICAL at 05:52

## 2018-02-12 RX ADMIN — PANTOPRAZOLE SODIUM 1 MG: 40 TABLET, DELAYED RELEASE ORAL at 08:03

## 2018-02-12 RX ADMIN — CEFPODOXIME PROXETIL 1 MG: 100 TABLET, FILM COATED ORAL at 09:21

## 2018-02-12 RX ADMIN — Medication 1 CAP: at 21:10

## 2018-02-12 RX ADMIN — METOPROLOL TARTRATE 1 MG: 25 TABLET ORAL at 21:11

## 2018-02-12 RX ADMIN — PANCRELIPASE LIPASE, PANCRELIPASE PROTEASE, PANCRELIPASE AMYLASE 1 CAP: 10000; 32000; 42000 CAPSULE, DELAYED RELEASE ORAL at 17:07

## 2018-02-12 RX ADMIN — CEFPODOXIME PROXETIL 1 MG: 100 TABLET, FILM COATED ORAL at 21:10

## 2018-02-13 RX ADMIN — OXYCODONE HYDROCHLORIDE AND ACETAMINOPHEN 1 TAB: 5; 325 TABLET ORAL at 03:57

## 2018-02-13 RX ADMIN — HYDROCORTISONE ACETATE 1 MG: 25 SUPPOSITORY RECTAL at 03:58

## 2018-02-13 RX ADMIN — PANTOPRAZOLE SODIUM 1 MG: 40 TABLET, DELAYED RELEASE ORAL at 08:52

## 2018-02-13 RX ADMIN — NITROGLYCERIN 1 INCH: 20 OINTMENT TOPICAL at 06:27

## 2018-02-13 RX ADMIN — METOPROLOL TARTRATE 1 MG: 25 TABLET ORAL at 08:51

## 2018-02-13 RX ADMIN — PANCRELIPASE LIPASE, PANCRELIPASE PROTEASE, PANCRELIPASE AMYLASE 1 CAP: 10000; 32000; 42000 CAPSULE, DELAYED RELEASE ORAL at 08:51

## 2018-02-13 RX ADMIN — NITROGLYCERIN 1 INCH: 20 OINTMENT TOPICAL at 13:26

## 2018-02-13 RX ADMIN — IPRATROPIUM BROMIDE AND ALBUTEROL SULFATE 1 ML: .5; 3 SOLUTION RESPIRATORY (INHALATION) at 04:03

## 2018-02-13 RX ADMIN — IPRATROPIUM BROMIDE AND ALBUTEROL SULFATE 1 ML: .5; 3 SOLUTION RESPIRATORY (INHALATION) at 12:02

## 2018-02-13 RX ADMIN — PANCRELIPASE LIPASE, PANCRELIPASE PROTEASE, PANCRELIPASE AMYLASE 1 CAP: 10000; 32000; 42000 CAPSULE, DELAYED RELEASE ORAL at 11:41

## 2018-02-13 RX ADMIN — SODIUM CHLORIDE, PRESERVATIVE FREE 1 UNIT: 5 INJECTION INTRAVENOUS at 13:20

## 2018-02-13 RX ADMIN — ONDANSETRON 1 MG: 4 TABLET, ORALLY DISINTEGRATING ORAL at 03:57

## 2018-02-13 RX ADMIN — CEFPODOXIME PROXETIL 1 MG: 100 TABLET, FILM COATED ORAL at 08:51

## 2018-02-13 RX ADMIN — IPRATROPIUM BROMIDE AND ALBUTEROL SULFATE 1 ML: .5; 3 SOLUTION RESPIRATORY (INHALATION) at 07:37

## 2018-02-13 RX ADMIN — APIXABAN 1 MG: 5 TABLET, FILM COATED ORAL at 08:52

## 2018-02-13 RX ADMIN — OXYCODONE HYDROCHLORIDE AND ACETAMINOPHEN 1 TAB: 5; 325 TABLET ORAL at 10:11

## 2018-02-13 RX ADMIN — Medication 1 CAP: at 08:52

## 2018-02-19 ENCOUNTER — HOSPITAL ENCOUNTER (INPATIENT)
Dept: HOSPITAL 61 - ER | Age: 78
LOS: 8 days | DRG: 177 | End: 2018-02-27
Attending: FAMILY MEDICINE | Admitting: FAMILY MEDICINE
Payer: MEDICARE

## 2018-02-19 DIAGNOSIS — K52.9: ICD-10-CM

## 2018-02-19 DIAGNOSIS — F17.200: ICD-10-CM

## 2018-02-19 DIAGNOSIS — Z90.49: ICD-10-CM

## 2018-02-19 DIAGNOSIS — J44.0: ICD-10-CM

## 2018-02-19 DIAGNOSIS — K86.89: ICD-10-CM

## 2018-02-19 DIAGNOSIS — K52.831: ICD-10-CM

## 2018-02-19 DIAGNOSIS — M54.9: ICD-10-CM

## 2018-02-19 DIAGNOSIS — Z87.01: ICD-10-CM

## 2018-02-19 DIAGNOSIS — D64.9: ICD-10-CM

## 2018-02-19 DIAGNOSIS — J15.9: ICD-10-CM

## 2018-02-19 DIAGNOSIS — I50.9: ICD-10-CM

## 2018-02-19 DIAGNOSIS — Z83.71: ICD-10-CM

## 2018-02-19 DIAGNOSIS — E78.5: ICD-10-CM

## 2018-02-19 DIAGNOSIS — Z96.659: ICD-10-CM

## 2018-02-19 DIAGNOSIS — I11.0: ICD-10-CM

## 2018-02-19 DIAGNOSIS — J96.21: ICD-10-CM

## 2018-02-19 DIAGNOSIS — G90.50: ICD-10-CM

## 2018-02-19 DIAGNOSIS — Z95.5: ICD-10-CM

## 2018-02-19 DIAGNOSIS — Z80.0: ICD-10-CM

## 2018-02-19 DIAGNOSIS — N17.9: ICD-10-CM

## 2018-02-19 DIAGNOSIS — J15.6: Primary | ICD-10-CM

## 2018-02-19 DIAGNOSIS — I13.0: ICD-10-CM

## 2018-02-19 DIAGNOSIS — G47.00: ICD-10-CM

## 2018-02-19 DIAGNOSIS — N18.9: ICD-10-CM

## 2018-02-19 DIAGNOSIS — I25.10: ICD-10-CM

## 2018-02-19 DIAGNOSIS — Z82.49: ICD-10-CM

## 2018-02-19 DIAGNOSIS — F41.9: ICD-10-CM

## 2018-02-19 DIAGNOSIS — E87.6: ICD-10-CM

## 2018-02-19 DIAGNOSIS — J18.9: ICD-10-CM

## 2018-02-19 DIAGNOSIS — Z90.710: ICD-10-CM

## 2018-02-19 DIAGNOSIS — I50.30: ICD-10-CM

## 2018-02-19 DIAGNOSIS — Z99.81: ICD-10-CM

## 2018-02-19 DIAGNOSIS — M81.0: ICD-10-CM

## 2018-02-19 LAB
% BANDS: 3 % (ref 0–9)
% LYMPHS: 3 % (ref 24–48)
% MONOS: 1 % (ref 0–10)
% SEGS: 93 % (ref 35–66)
ADD MAN DIFF?: YES
ALBUMIN SERPL-MCNC: 2.7 G/DL (ref 3.4–5)
ALBUMIN/GLOB SERPL: 0.6 {RATIO} (ref 1–1.7)
ALP SERPL-CCNC: 114 U/L (ref 46–116)
ALT (SGPT): 20 U/L (ref 14–59)
ANION GAP SERPL CALC-SCNC: 12 MMOL/L (ref 6–14)
AST SERPL-CCNC: 24 U/L (ref 15–37)
BASE EXCESS ABG: 3 MMOL/L (ref -3–3)
BASO #: 0.1 X10^3/UL (ref 0–0.2)
BASO %: 1 % (ref 0–3)
BLOOD UREA NITROGEN: 21 MG/DL (ref 7–20)
BUN/CREAT SERPL: 19 (ref 6–20)
CALCIUM: 9.1 MG/DL (ref 8.5–10.1)
CHLORIDE: 100 MMOL/L (ref 98–107)
CO2 SERPL-SCNC: 29 MMOL/L (ref 21–32)
CREAT SERPL-MCNC: 1.1 MG/DL (ref 0.6–1)
EOS #: 0.1 X10^3/UL (ref 0–0.7)
EOS %: 1 % (ref 0–3)
FIO2 ABG: 40
GFR SERPLBLD BASED ON 1.73 SQ M-ARVRAT: 47.8 ML/MIN
GLOBULIN SER-MCNC: 4.2 G/DL (ref 2.2–3.8)
GLUCOSE SERPL-MCNC: 128 MG/DL (ref 70–99)
HCG SERPL-ACNC: 10.4 X10^3/UL (ref 4–11)
HCO3 ABG: 27 MMOL/L (ref 21–28)
HEMATOCRIT: 31.5 % (ref 36–47)
HEMOGLOBIN: 10.7 G/DL (ref 12–15.5)
INFLUENZA A PATIENT: NEGATIVE
INFLUENZA B PATIENT: NEGATIVE
LACTIC ACID: 1.6 MMOL/L (ref 0.4–2)
LIPASE: 80 U/L (ref 73–393)
LYMPH #: 0.3 X10^3/UL (ref 1–4.8)
LYMPH %: 3 % (ref 24–48)
MEAN CORPUSCULAR HEMOGLOBIN: 33 PG (ref 25–35)
MEAN CORPUSCULAR HGB CONC: 34 G/DL (ref 31–37)
MEAN CORPUSCULAR VOLUME: 98 FL (ref 79–100)
MONO #: 0.5 X10^3/UL (ref 0–1.1)
MONO %: 4 % (ref 0–9)
NEUT #: 9.5 X10^3UL (ref 1.8–7.7)
NEUT %: 91 % (ref 31–73)
OBC FLU: (no result)
PCO2 ABG: 38 MMHG (ref 35–46)
PH ABG: 7.47 (ref 7.35–7.45)
PLATELET COUNT: 228 X10^3/UL (ref 140–400)
PLT ESTIMATE: ADEQUATE
PO2 ABG: 67 MMHG (ref 65–108)
POTASSIUM SERPL-SCNC: 3.2 MMOL/L (ref 3.5–5.1)
RED BLOOD COUNT: 3.23 X10^6/UL (ref 3.5–5.4)
RED CELL DISTRIBUTION WIDTH: 15.2 % (ref 11.5–14.5)
SAT O2 ABG: 93 % (ref 92–99)
SODIUM: 141 MMOL/L (ref 136–145)
TOTAL BILIRUBIN: 0.7 MG/DL (ref 0.2–1)
TOTAL PROTEIN: 6.9 G/DL (ref 6.4–8.2)
TROPONINI: 1.56 NG/ML (ref 0–0.06)

## 2018-02-19 PROCEDURE — 97530 THERAPEUTIC ACTIVITIES: CPT

## 2018-02-19 PROCEDURE — 94660 CPAP INITIATION&MGMT: CPT

## 2018-02-19 PROCEDURE — 85027 COMPLETE CBC AUTOMATED: CPT

## 2018-02-19 PROCEDURE — 87045 FECES CULTURE AEROBIC BACT: CPT

## 2018-02-19 PROCEDURE — 96368 THER/DIAG CONCURRENT INF: CPT

## 2018-02-19 PROCEDURE — 96365 THER/PROPH/DIAG IV INF INIT: CPT

## 2018-02-19 PROCEDURE — 85025 COMPLETE CBC W/AUTO DIFF WBC: CPT

## 2018-02-19 PROCEDURE — 36415 COLL VENOUS BLD VENIPUNCTURE: CPT

## 2018-02-19 PROCEDURE — 84132 ASSAY OF SERUM POTASSIUM: CPT

## 2018-02-19 PROCEDURE — 82962 GLUCOSE BLOOD TEST: CPT

## 2018-02-19 PROCEDURE — 82565 ASSAY OF CREATININE: CPT

## 2018-02-19 PROCEDURE — 71045 X-RAY EXAM CHEST 1 VIEW: CPT

## 2018-02-19 PROCEDURE — 83690 ASSAY OF LIPASE: CPT

## 2018-02-19 PROCEDURE — 97116 GAIT TRAINING THERAPY: CPT

## 2018-02-19 PROCEDURE — 97162 PT EVAL MOD COMPLEX 30 MIN: CPT

## 2018-02-19 PROCEDURE — 93005 ELECTROCARDIOGRAM TRACING: CPT

## 2018-02-19 PROCEDURE — 80048 BASIC METABOLIC PNL TOTAL CA: CPT

## 2018-02-19 PROCEDURE — 96375 TX/PRO/DX INJ NEW DRUG ADDON: CPT

## 2018-02-19 PROCEDURE — 36600 WITHDRAWAL OF ARTERIAL BLOOD: CPT

## 2018-02-19 PROCEDURE — 87804 INFLUENZA ASSAY W/OPTIC: CPT

## 2018-02-19 PROCEDURE — 83605 ASSAY OF LACTIC ACID: CPT

## 2018-02-19 PROCEDURE — 97166 OT EVAL MOD COMPLEX 45 MIN: CPT

## 2018-02-19 PROCEDURE — 87205 SMEAR GRAM STAIN: CPT

## 2018-02-19 PROCEDURE — 87324 CLOSTRIDIUM AG IA: CPT

## 2018-02-19 PROCEDURE — 80053 COMPREHEN METABOLIC PANEL: CPT

## 2018-02-19 PROCEDURE — 97110 THERAPEUTIC EXERCISES: CPT

## 2018-02-19 PROCEDURE — 71250 CT THORAX DX C-: CPT

## 2018-02-19 PROCEDURE — 84484 ASSAY OF TROPONIN QUANT: CPT

## 2018-02-19 PROCEDURE — 94640 AIRWAY INHALATION TREATMENT: CPT

## 2018-02-19 PROCEDURE — 5A09357 ASSISTANCE WITH RESPIRATORY VENTILATION, LESS THAN 24 CONSECUTIVE HOURS, CONTINUOUS POSITIVE AIRWAY PRESSURE: ICD-10-PCS

## 2018-02-19 PROCEDURE — 99285 EMERGENCY DEPT VISIT HI MDM: CPT

## 2018-02-19 PROCEDURE — 97535 SELF CARE MNGMENT TRAINING: CPT

## 2018-02-19 PROCEDURE — 85007 BL SMEAR W/DIFF WBC COUNT: CPT

## 2018-02-19 PROCEDURE — 82805 BLOOD GASES W/O2 SATURATION: CPT

## 2018-02-19 PROCEDURE — 87040 BLOOD CULTURE FOR BACTERIA: CPT

## 2018-02-19 PROCEDURE — 80202 ASSAY OF VANCOMYCIN: CPT

## 2018-02-19 PROCEDURE — 94760 N-INVAS EAR/PLS OXIMETRY 1: CPT

## 2018-02-19 PROCEDURE — 87641 MR-STAPH DNA AMP PROBE: CPT

## 2018-02-19 RX ADMIN — FENTANYL CITRATE 1 MCG: 50 INJECTION INTRAMUSCULAR; INTRAVENOUS at 17:22

## 2018-02-19 RX ADMIN — VANCOMYCIN HYDROCHLORIDE 1 MLS/HR: 1 INJECTION, POWDER, FOR SOLUTION INTRAVENOUS at 15:11

## 2018-02-19 RX ADMIN — TAMSULOSIN HYDROCHLORIDE 1 MG: 0.4 CAPSULE ORAL at 22:30

## 2018-02-19 RX ADMIN — OXYCODONE HYDROCHLORIDE AND ACETAMINOPHEN 1 TAB: 5; 325 TABLET ORAL at 22:40

## 2018-02-19 RX ADMIN — IPRATROPIUM BROMIDE AND ALBUTEROL SULFATE 1 ML: .5; 3 SOLUTION RESPIRATORY (INHALATION) at 23:54

## 2018-02-19 RX ADMIN — FUROSEMIDE 1 MG: 10 INJECTION, SOLUTION INTRAMUSCULAR; INTRAVENOUS at 23:20

## 2018-02-19 RX ADMIN — ASPIRIN 81 MG 1 MG: 81 TABLET ORAL at 17:21

## 2018-02-19 RX ADMIN — Medication 1 CAP: at 21:00

## 2018-02-19 RX ADMIN — PIPERACILLIN SODIUM AND TAZOBACTAM SODIUM 1 MLS/HR: 3; .375 INJECTION, POWDER, LYOPHILIZED, FOR SOLUTION INTRAVENOUS at 18:39

## 2018-02-19 RX ADMIN — PIPERACILLIN SODIUM AND TAZOBACTAM SODIUM 1 MLS/HR: 3; .375 INJECTION, POWDER, LYOPHILIZED, FOR SOLUTION INTRAVENOUS at 14:51

## 2018-02-19 RX ADMIN — ALBUMIN (HUMAN) 1 MLS/HR: 0.25 INJECTION, SOLUTION INTRAVENOUS at 21:34

## 2018-02-19 RX ADMIN — VANCOMYCIN HYDROCHLORIDE 1 EACH: 1 INJECTION, POWDER, LYOPHILIZED, FOR SOLUTION INTRAVENOUS at 18:42

## 2018-02-19 RX ADMIN — FUROSEMIDE 1 MG: 10 INJECTION, SOLUTION INTRAMUSCULAR; INTRAVENOUS at 13:13

## 2018-02-20 LAB
ADD MAN DIFF?: NO
ALBUMIN SERPL-MCNC: 2.5 G/DL (ref 3.4–5)
ALBUMIN/GLOB SERPL: 0.8 {RATIO} (ref 1–1.7)
ALP SERPL-CCNC: 81 U/L (ref 46–116)
ALT (SGPT): 16 U/L (ref 14–59)
ANION GAP SERPL CALC-SCNC: 10 MMOL/L (ref 6–14)
AST SERPL-CCNC: 17 U/L (ref 15–37)
BASO #: 0 X10^3/UL (ref 0–0.2)
BASO %: 0 % (ref 0–3)
BLOOD UREA NITROGEN: 22 MG/DL (ref 7–20)
BUN/CREAT SERPL: 18 (ref 6–20)
CALCIUM: 8.2 MG/DL (ref 8.5–10.1)
CHLORIDE: 101 MMOL/L (ref 98–107)
CO2 SERPL-SCNC: 31 MMOL/L (ref 21–32)
CREAT SERPL-MCNC: 1.2 MG/DL (ref 0.6–1)
EOS #: 0.1 X10^3/UL (ref 0–0.7)
EOS %: 1 % (ref 0–3)
GFR SERPLBLD BASED ON 1.73 SQ M-ARVRAT: 43.2 ML/MIN
GLOBULIN SER-MCNC: 3 G/DL (ref 2.2–3.8)
GLUCOSE SERPL-MCNC: 107 MG/DL (ref 70–99)
HCG SERPL-ACNC: 8.2 X10^3/UL (ref 4–11)
HEMATOCRIT: 26.3 % (ref 36–47)
HEMOGLOBIN: 9 G/DL (ref 12–15.5)
LYMPH #: 0.3 X10^3/UL (ref 1–4.8)
LYMPH %: 4 % (ref 24–48)
MEAN CORPUSCULAR HEMOGLOBIN: 33 PG (ref 25–35)
MEAN CORPUSCULAR HGB CONC: 34 G/DL (ref 31–37)
MEAN CORPUSCULAR VOLUME: 97 FL (ref 79–100)
MONO #: 0.4 X10^3/UL (ref 0–1.1)
MONO %: 5 % (ref 0–9)
NEUT #: 7.4 X10^3UL (ref 1.8–7.7)
NEUT %: 89 % (ref 31–73)
PLATELET COUNT: 164 X10^3/UL (ref 140–400)
POTASSIUM SERPL-SCNC: 2.6 MMOL/L (ref 3.5–5.1)
POTASSIUM SERPL-SCNC: 3.5 MMOL/L (ref 3.5–5.1)
RED BLOOD COUNT: 2.72 X10^6/UL (ref 3.5–5.4)
RED CELL DISTRIBUTION WIDTH: 14.8 % (ref 11.5–14.5)
SODIUM: 142 MMOL/L (ref 136–145)
TOTAL BILIRUBIN: 0.6 MG/DL (ref 0.2–1)
TOTAL PROTEIN: 5.5 G/DL (ref 6.4–8.2)

## 2018-02-20 RX ADMIN — PIPERACILLIN SODIUM AND TAZOBACTAM SODIUM 1 MLS/HR: 3; .375 INJECTION, POWDER, LYOPHILIZED, FOR SOLUTION INTRAVENOUS at 18:00

## 2018-02-20 RX ADMIN — FUROSEMIDE 1 MG: 10 INJECTION, SOLUTION INTRAMUSCULAR; INTRAVENOUS at 09:15

## 2018-02-20 RX ADMIN — PIPERACILLIN SODIUM AND TAZOBACTAM SODIUM 1 MLS/HR: 3; .375 INJECTION, POWDER, LYOPHILIZED, FOR SOLUTION INTRAVENOUS at 05:34

## 2018-02-20 RX ADMIN — FENTANYL CITRATE 1 MCG: 50 INJECTION INTRAMUSCULAR; INTRAVENOUS at 01:34

## 2018-02-20 RX ADMIN — VANCOMYCIN HYDROCHLORIDE 1 MLS/HR: 1 INJECTION, POWDER, FOR SOLUTION INTRAVENOUS at 15:00

## 2018-02-20 RX ADMIN — POTASSIUM CHLORIDE 1 MLS/HR: 400 INJECTION, SOLUTION INTRAVENOUS at 16:00

## 2018-02-20 RX ADMIN — OXYCODONE HYDROCHLORIDE AND ACETAMINOPHEN 1 TAB: 5; 325 TABLET ORAL at 11:55

## 2018-02-20 RX ADMIN — VANCOMYCIN HYDROCHLORIDE 1 EACH: 1 INJECTION, POWDER, LYOPHILIZED, FOR SOLUTION INTRAVENOUS at 13:32

## 2018-02-20 RX ADMIN — LIDOCAINE 1 PATCH: 50 PATCH CUTANEOUS at 17:00

## 2018-02-20 RX ADMIN — PANTOPRAZOLE SODIUM 1 MG: 40 TABLET, DELAYED RELEASE ORAL at 09:12

## 2018-02-20 RX ADMIN — ALBUMIN (HUMAN) 1 MLS/HR: 0.25 INJECTION, SOLUTION INTRAVENOUS at 23:53

## 2018-02-20 RX ADMIN — IPRATROPIUM BROMIDE AND ALBUTEROL SULFATE 1 ML: .5; 3 SOLUTION RESPIRATORY (INHALATION) at 19:20

## 2018-02-20 RX ADMIN — ATENOLOL 1 MG: 25 TABLET ORAL at 23:35

## 2018-02-20 RX ADMIN — ONDANSETRON 1 MG: 4 TABLET, ORALLY DISINTEGRATING ORAL at 05:33

## 2018-02-20 RX ADMIN — NITROGLYCERIN 1 MG: 0.4 TABLET SUBLINGUAL at 13:25

## 2018-02-20 RX ADMIN — OXYCODONE HYDROCHLORIDE AND ACETAMINOPHEN 1 TAB: 5; 325 TABLET ORAL at 05:33

## 2018-02-20 RX ADMIN — IPRATROPIUM BROMIDE AND ALBUTEROL SULFATE 1 ML: .5; 3 SOLUTION RESPIRATORY (INHALATION) at 07:16

## 2018-02-20 RX ADMIN — ALBUMIN (HUMAN) 1 MLS/HR: 0.25 INJECTION, SOLUTION INTRAVENOUS at 14:00

## 2018-02-20 RX ADMIN — POTASSIUM CHLORIDE 1 MLS/HR: 400 INJECTION, SOLUTION INTRAVENOUS at 12:05

## 2018-02-20 RX ADMIN — FUROSEMIDE 1 MG: 10 INJECTION, SOLUTION INTRAMUSCULAR; INTRAVENOUS at 14:00

## 2018-02-20 RX ADMIN — APIXABAN 1 MG: 2.5 TABLET, FILM COATED ORAL at 23:35

## 2018-02-20 RX ADMIN — FENTANYL CITRATE 1 MCG: 50 INJECTION INTRAMUSCULAR; INTRAVENOUS at 13:22

## 2018-02-20 RX ADMIN — POTASSIUM CHLORIDE 1 MLS/HR: 400 INJECTION, SOLUTION INTRAVENOUS at 23:54

## 2018-02-20 RX ADMIN — FENTANYL CITRATE 1 MCG: 50 INJECTION INTRAMUSCULAR; INTRAVENOUS at 09:07

## 2018-02-20 RX ADMIN — POTASSIUM CHLORIDE 1 MEQ: 750 TABLET, FILM COATED, EXTENDED RELEASE ORAL at 09:13

## 2018-02-20 RX ADMIN — FUROSEMIDE 1 MG: 10 INJECTION, SOLUTION INTRAMUSCULAR; INTRAVENOUS at 23:37

## 2018-02-20 RX ADMIN — PIPERACILLIN SODIUM AND TAZOBACTAM SODIUM 1 MLS/HR: 3; .375 INJECTION, POWDER, LYOPHILIZED, FOR SOLUTION INTRAVENOUS at 00:07

## 2018-02-20 RX ADMIN — PANCRELIPASE LIPASE, PANCRELIPASE PROTEASE, PANCRELIPASE AMYLASE 1 CAP: 10000; 32000; 42000 CAPSULE, DELAYED RELEASE ORAL at 09:12

## 2018-02-20 RX ADMIN — TAMSULOSIN HYDROCHLORIDE 1 MG: 0.4 CAPSULE ORAL at 23:35

## 2018-02-20 RX ADMIN — IPRATROPIUM BROMIDE AND ALBUTEROL SULFATE 1 ML: .5; 3 SOLUTION RESPIRATORY (INHALATION) at 11:08

## 2018-02-20 RX ADMIN — ISOSORBIDE MONONITRATE 1 MG: 30 TABLET, EXTENDED RELEASE ORAL at 09:12

## 2018-02-20 RX ADMIN — NITROGLYCERIN 1 MG: 0.4 TABLET SUBLINGUAL at 09:09

## 2018-02-20 RX ADMIN — Medication 1 CAP: at 09:12

## 2018-02-20 RX ADMIN — PANCRELIPASE LIPASE, PANCRELIPASE PROTEASE, PANCRELIPASE AMYLASE 1 CAP: 10000; 32000; 42000 CAPSULE, DELAYED RELEASE ORAL at 11:53

## 2018-02-20 RX ADMIN — Medication 1 EACH: at 03:49

## 2018-02-20 RX ADMIN — FENTANYL 1 PATCH: 50 PATCH, EXTENDED RELEASE TRANSDERMAL at 11:54

## 2018-02-20 RX ADMIN — PIPERACILLIN SODIUM AND TAZOBACTAM SODIUM 1 MLS/HR: 3; .375 INJECTION, POWDER, LYOPHILIZED, FOR SOLUTION INTRAVENOUS at 23:44

## 2018-02-20 RX ADMIN — PIPERACILLIN SODIUM AND TAZOBACTAM SODIUM 1 MLS/HR: 3; .375 INJECTION, POWDER, LYOPHILIZED, FOR SOLUTION INTRAVENOUS at 11:54

## 2018-02-20 RX ADMIN — PANCRELIPASE LIPASE, PANCRELIPASE PROTEASE, PANCRELIPASE AMYLASE 1 CAP: 10000; 32000; 42000 CAPSULE, DELAYED RELEASE ORAL at 17:00

## 2018-02-20 RX ADMIN — Medication 1 CAP: at 23:35

## 2018-02-20 RX ADMIN — APIXABAN 1 MG: 2.5 TABLET, FILM COATED ORAL at 09:12

## 2018-02-20 RX ADMIN — ALBUMIN (HUMAN) 1 MLS/HR: 0.25 INJECTION, SOLUTION INTRAVENOUS at 06:10

## 2018-02-20 RX ADMIN — IPRATROPIUM BROMIDE AND ALBUTEROL SULFATE 1 ML: .5; 3 SOLUTION RESPIRATORY (INHALATION) at 15:23

## 2018-02-20 RX ADMIN — ATENOLOL 1 MG: 25 TABLET ORAL at 09:11

## 2018-02-21 LAB
CREAT SERPL-MCNC: 1.6 MG/DL (ref 0.6–1)
GFR SERPLBLD BASED ON 1.73 SQ M-ARVRAT: 31 ML/MIN
VANC TR: 18 MCG/ML (ref 10–20)

## 2018-02-21 RX ADMIN — PIPERACILLIN SODIUM AND TAZOBACTAM SODIUM 1 MLS/HR: 3; .375 INJECTION, POWDER, LYOPHILIZED, FOR SOLUTION INTRAVENOUS at 11:44

## 2018-02-21 RX ADMIN — OXYCODONE HYDROCHLORIDE AND ACETAMINOPHEN 1 TAB: 5; 325 TABLET ORAL at 02:37

## 2018-02-21 RX ADMIN — PANCRELIPASE LIPASE, PANCRELIPASE PROTEASE, PANCRELIPASE AMYLASE 1 CAP: 10000; 32000; 42000 CAPSULE, DELAYED RELEASE ORAL at 07:53

## 2018-02-21 RX ADMIN — PANCRELIPASE LIPASE, PANCRELIPASE PROTEASE, PANCRELIPASE AMYLASE 1 CAP: 10000; 32000; 42000 CAPSULE, DELAYED RELEASE ORAL at 11:44

## 2018-02-21 RX ADMIN — Medication 1 CAP: at 20:28

## 2018-02-21 RX ADMIN — APIXABAN 1 MG: 2.5 TABLET, FILM COATED ORAL at 07:53

## 2018-02-21 RX ADMIN — ALBUMIN (HUMAN) 1 MLS/HR: 0.25 INJECTION, SOLUTION INTRAVENOUS at 15:52

## 2018-02-21 RX ADMIN — VANCOMYCIN HYDROCHLORIDE 1 EACH: 1 INJECTION, POWDER, LYOPHILIZED, FOR SOLUTION INTRAVENOUS at 16:38

## 2018-02-21 RX ADMIN — PIPERACILLIN SODIUM AND TAZOBACTAM SODIUM 1 MLS/HR: 3; .375 INJECTION, POWDER, LYOPHILIZED, FOR SOLUTION INTRAVENOUS at 05:28

## 2018-02-21 RX ADMIN — POTASSIUM CHLORIDE 1 MEQ: 1500 TABLET, EXTENDED RELEASE ORAL at 20:28

## 2018-02-21 RX ADMIN — PIPERACILLIN SODIUM AND TAZOBACTAM SODIUM 1 MLS/HR: 3; .375 INJECTION, POWDER, LYOPHILIZED, FOR SOLUTION INTRAVENOUS at 18:20

## 2018-02-21 RX ADMIN — IPRATROPIUM BROMIDE AND ALBUTEROL SULFATE 1 ML: .5; 3 SOLUTION RESPIRATORY (INHALATION) at 07:03

## 2018-02-21 RX ADMIN — PANTOPRAZOLE SODIUM 1 MG: 40 TABLET, DELAYED RELEASE ORAL at 05:27

## 2018-02-21 RX ADMIN — ATENOLOL 1 MG: 25 TABLET ORAL at 07:55

## 2018-02-21 RX ADMIN — IPRATROPIUM BROMIDE AND ALBUTEROL SULFATE 1 ML: .5; 3 SOLUTION RESPIRATORY (INHALATION) at 14:59

## 2018-02-21 RX ADMIN — PANCRELIPASE LIPASE, PANCRELIPASE PROTEASE, PANCRELIPASE AMYLASE 1 CAP: 10000; 32000; 42000 CAPSULE, DELAYED RELEASE ORAL at 17:15

## 2018-02-21 RX ADMIN — ALBUMIN (HUMAN) 1 MLS/HR: 0.25 INJECTION, SOLUTION INTRAVENOUS at 05:28

## 2018-02-21 RX ADMIN — OXYCODONE HYDROCHLORIDE AND ACETAMINOPHEN 1 TAB: 5; 325 TABLET ORAL at 13:34

## 2018-02-21 RX ADMIN — TAMSULOSIN HYDROCHLORIDE 1 MG: 0.4 CAPSULE ORAL at 20:28

## 2018-02-21 RX ADMIN — POTASSIUM CHLORIDE 1 MEQ: 750 TABLET, FILM COATED, EXTENDED RELEASE ORAL at 07:54

## 2018-02-21 RX ADMIN — OXYCODONE HYDROCHLORIDE AND ACETAMINOPHEN 1 TAB: 5; 325 TABLET ORAL at 20:29

## 2018-02-21 RX ADMIN — ISOSORBIDE MONONITRATE 1 MG: 30 TABLET, EXTENDED RELEASE ORAL at 07:54

## 2018-02-21 RX ADMIN — OXYCODONE HYDROCHLORIDE AND ACETAMINOPHEN 1 TAB: 5; 325 TABLET ORAL at 07:53

## 2018-02-21 RX ADMIN — POTASSIUM CHLORIDE 1 MEQ: 1500 TABLET, EXTENDED RELEASE ORAL at 11:44

## 2018-02-21 RX ADMIN — IPRATROPIUM BROMIDE AND ALBUTEROL SULFATE 1 ML: .5; 3 SOLUTION RESPIRATORY (INHALATION) at 10:54

## 2018-02-21 RX ADMIN — Medication 1 CAP: at 07:54

## 2018-02-21 RX ADMIN — ATENOLOL 1 MG: 25 TABLET ORAL at 23:53

## 2018-02-21 RX ADMIN — VANCOMYCIN HYDROCHLORIDE 1 MLS/HR: 1 INJECTION, POWDER, FOR SOLUTION INTRAVENOUS at 17:10

## 2018-02-21 RX ADMIN — IPRATROPIUM BROMIDE AND ALBUTEROL SULFATE 1 ML: .5; 3 SOLUTION RESPIRATORY (INHALATION) at 19:52

## 2018-02-21 RX ADMIN — FUROSEMIDE 1 MG: 10 INJECTION, SOLUTION INTRAMUSCULAR; INTRAVENOUS at 15:52

## 2018-02-21 RX ADMIN — PIPERACILLIN SODIUM AND TAZOBACTAM SODIUM 1 MLS/HR: 3; .375 INJECTION, POWDER, LYOPHILIZED, FOR SOLUTION INTRAVENOUS at 23:52

## 2018-02-21 RX ADMIN — FUROSEMIDE 1 MG: 10 INJECTION, SOLUTION INTRAMUSCULAR; INTRAVENOUS at 05:29

## 2018-02-21 RX ADMIN — POTASSIUM CHLORIDE 1 MLS/HR: 400 INJECTION, SOLUTION INTRAVENOUS at 04:02

## 2018-02-21 RX ADMIN — FUROSEMIDE 1 MG: 10 INJECTION, SOLUTION INTRAMUSCULAR; INTRAVENOUS at 09:00

## 2018-02-21 RX ADMIN — LIDOCAINE 1 PATCH: 50 PATCH CUTANEOUS at 07:55

## 2018-02-21 RX ADMIN — APIXABAN 1 MG: 2.5 TABLET, FILM COATED ORAL at 20:27

## 2018-02-21 RX ADMIN — ONDANSETRON 1 MG: 4 TABLET, ORALLY DISINTEGRATING ORAL at 05:25

## 2018-02-22 LAB
ANION GAP SERPL CALC-SCNC: 11 MMOL/L (ref 6–14)
BLOOD UREA NITROGEN: 27 MG/DL (ref 7–20)
CALCIUM: 8.9 MG/DL (ref 8.5–10.1)
CHLORIDE: 99 MMOL/L (ref 98–107)
CO2 SERPL-SCNC: 31 MMOL/L (ref 21–32)
CREAT SERPL-MCNC: 1.6 MG/DL (ref 0.6–1)
GFR SERPLBLD BASED ON 1.73 SQ M-ARVRAT: 31 ML/MIN
GLUCOSE SERPL-MCNC: 98 MG/DL (ref 70–99)
POTASSIUM SERPL-SCNC: 3.7 MMOL/L (ref 3.5–5.1)
SODIUM: 141 MMOL/L (ref 136–145)

## 2018-02-22 RX ADMIN — PIPERACILLIN SODIUM AND TAZOBACTAM SODIUM 1 MLS/HR: 3; .375 INJECTION, POWDER, LYOPHILIZED, FOR SOLUTION INTRAVENOUS at 15:02

## 2018-02-22 RX ADMIN — PIPERACILLIN SODIUM AND TAZOBACTAM SODIUM 1 MLS/HR: 3; .375 INJECTION, POWDER, LYOPHILIZED, FOR SOLUTION INTRAVENOUS at 05:53

## 2018-02-22 RX ADMIN — OXYCODONE HYDROCHLORIDE AND ACETAMINOPHEN 1 TAB: 5; 325 TABLET ORAL at 21:34

## 2018-02-22 RX ADMIN — LIDOCAINE 1 PATCH: 50 PATCH CUTANEOUS at 08:54

## 2018-02-22 RX ADMIN — PANCRELIPASE LIPASE, PANCRELIPASE PROTEASE, PANCRELIPASE AMYLASE 1 CAP: 10000; 32000; 42000 CAPSULE, DELAYED RELEASE ORAL at 08:53

## 2018-02-22 RX ADMIN — APIXABAN 1 MG: 2.5 TABLET, FILM COATED ORAL at 21:34

## 2018-02-22 RX ADMIN — Medication 1 CAP: at 21:34

## 2018-02-22 RX ADMIN — APIXABAN 1 MG: 2.5 TABLET, FILM COATED ORAL at 08:53

## 2018-02-22 RX ADMIN — OXYCODONE HYDROCHLORIDE AND ACETAMINOPHEN 1 TAB: 5; 325 TABLET ORAL at 02:09

## 2018-02-22 RX ADMIN — TAMSULOSIN HYDROCHLORIDE 1 MG: 0.4 CAPSULE ORAL at 21:34

## 2018-02-22 RX ADMIN — ATENOLOL 1 MG: 25 TABLET ORAL at 08:53

## 2018-02-22 RX ADMIN — PANCRELIPASE LIPASE, PANCRELIPASE PROTEASE, PANCRELIPASE AMYLASE 1 CAP: 10000; 32000; 42000 CAPSULE, DELAYED RELEASE ORAL at 15:02

## 2018-02-22 RX ADMIN — IPRATROPIUM BROMIDE AND ALBUTEROL SULFATE 1 ML: .5; 3 SOLUTION RESPIRATORY (INHALATION) at 14:48

## 2018-02-22 RX ADMIN — OXYCODONE HYDROCHLORIDE AND ACETAMINOPHEN 1 TAB: 5; 325 TABLET ORAL at 16:00

## 2018-02-22 RX ADMIN — IPRATROPIUM BROMIDE AND ALBUTEROL SULFATE 1 ML: .5; 3 SOLUTION RESPIRATORY (INHALATION) at 07:23

## 2018-02-22 RX ADMIN — Medication 1 EACH: at 12:42

## 2018-02-22 RX ADMIN — POTASSIUM CHLORIDE 1 MEQ: 1500 TABLET, EXTENDED RELEASE ORAL at 08:52

## 2018-02-22 RX ADMIN — ATENOLOL 1 MG: 25 TABLET ORAL at 21:34

## 2018-02-22 RX ADMIN — VANCOMYCIN HYDROCHLORIDE 1 EACH: 1 INJECTION, POWDER, LYOPHILIZED, FOR SOLUTION INTRAVENOUS at 12:44

## 2018-02-22 RX ADMIN — PANCRELIPASE LIPASE, PANCRELIPASE PROTEASE, PANCRELIPASE AMYLASE 1 CAP: 10000; 32000; 42000 CAPSULE, DELAYED RELEASE ORAL at 18:11

## 2018-02-22 RX ADMIN — Medication 1 CAP: at 08:53

## 2018-02-22 RX ADMIN — IPRATROPIUM BROMIDE AND ALBUTEROL SULFATE 1 ML: .5; 3 SOLUTION RESPIRATORY (INHALATION) at 19:41

## 2018-02-22 RX ADMIN — IPRATROPIUM BROMIDE AND ALBUTEROL SULFATE 1 ML: .5; 3 SOLUTION RESPIRATORY (INHALATION) at 11:05

## 2018-02-22 RX ADMIN — POTASSIUM CHLORIDE 1 MEQ: 1500 TABLET, EXTENDED RELEASE ORAL at 21:33

## 2018-02-22 RX ADMIN — PIPERACILLIN SODIUM AND TAZOBACTAM SODIUM 1 MLS/HR: 3; .375 INJECTION, POWDER, LYOPHILIZED, FOR SOLUTION INTRAVENOUS at 18:12

## 2018-02-22 RX ADMIN — PANTOPRAZOLE SODIUM 1 MG: 40 TABLET, DELAYED RELEASE ORAL at 08:53

## 2018-02-22 RX ADMIN — OXYCODONE HYDROCHLORIDE AND ACETAMINOPHEN 1 TAB: 5; 325 TABLET ORAL at 08:54

## 2018-02-22 RX ADMIN — ISOSORBIDE MONONITRATE 1 MG: 30 TABLET, EXTENDED RELEASE ORAL at 08:54

## 2018-02-22 RX ADMIN — VANCOMYCIN HYDROCHLORIDE 1 MLS/HR: 1 INJECTION, POWDER, FOR SOLUTION INTRAVENOUS at 16:01

## 2018-02-23 LAB
ANION GAP SERPL CALC-SCNC: 9 MMOL/L (ref 6–14)
BLOOD UREA NITROGEN: 29 MG/DL (ref 7–20)
CALCIUM: 8.6 MG/DL (ref 8.5–10.1)
CHLORIDE: 102 MMOL/L (ref 98–107)
CO2 SERPL-SCNC: 28 MMOL/L (ref 21–32)
CREAT SERPL-MCNC: 1.8 MG/DL (ref 0.6–1)
GFR SERPLBLD BASED ON 1.73 SQ M-ARVRAT: 27.1 ML/MIN
GLUCOSE SERPL-MCNC: 97 MG/DL (ref 70–99)
POTASSIUM SERPL-SCNC: 4 MMOL/L (ref 3.5–5.1)
SODIUM: 139 MMOL/L (ref 136–145)
VANC TR: 18.2 MCG/ML (ref 10–20)

## 2018-02-23 RX ADMIN — APIXABAN 1 MG: 2.5 TABLET, FILM COATED ORAL at 09:05

## 2018-02-23 RX ADMIN — PIPERACILLIN SODIUM AND TAZOBACTAM SODIUM 1 MLS/HR: 3; .375 INJECTION, POWDER, LYOPHILIZED, FOR SOLUTION INTRAVENOUS at 23:46

## 2018-02-23 RX ADMIN — PANCRELIPASE LIPASE, PANCRELIPASE PROTEASE, PANCRELIPASE AMYLASE 1 CAP: 10000; 32000; 42000 CAPSULE, DELAYED RELEASE ORAL at 09:05

## 2018-02-23 RX ADMIN — OXYCODONE HYDROCHLORIDE AND ACETAMINOPHEN 1 TAB: 5; 325 TABLET ORAL at 23:46

## 2018-02-23 RX ADMIN — PANCRELIPASE LIPASE, PANCRELIPASE PROTEASE, PANCRELIPASE AMYLASE 1 CAP: 10000; 32000; 42000 CAPSULE, DELAYED RELEASE ORAL at 14:28

## 2018-02-23 RX ADMIN — ONDANSETRON 1 MG: 4 TABLET, ORALLY DISINTEGRATING ORAL at 03:23

## 2018-02-23 RX ADMIN — ISOSORBIDE MONONITRATE 1 MG: 30 TABLET, EXTENDED RELEASE ORAL at 09:06

## 2018-02-23 RX ADMIN — POTASSIUM CHLORIDE 1 MEQ: 1500 TABLET, EXTENDED RELEASE ORAL at 20:07

## 2018-02-23 RX ADMIN — ATENOLOL 1 MG: 25 TABLET ORAL at 20:08

## 2018-02-23 RX ADMIN — LIDOCAINE 1 PATCH: 50 PATCH CUTANEOUS at 09:07

## 2018-02-23 RX ADMIN — LIDOCAINE 1 PATCH: 50 PATCH CUTANEOUS at 09:00

## 2018-02-23 RX ADMIN — Medication 1 CAP: at 09:06

## 2018-02-23 RX ADMIN — PIPERACILLIN SODIUM AND TAZOBACTAM SODIUM 1 MLS/HR: 3; .375 INJECTION, POWDER, LYOPHILIZED, FOR SOLUTION INTRAVENOUS at 06:19

## 2018-02-23 RX ADMIN — IPRATROPIUM BROMIDE AND ALBUTEROL SULFATE 1 ML: .5; 3 SOLUTION RESPIRATORY (INHALATION) at 15:15

## 2018-02-23 RX ADMIN — APIXABAN 1 MG: 2.5 TABLET, FILM COATED ORAL at 20:08

## 2018-02-23 RX ADMIN — OXYCODONE HYDROCHLORIDE AND ACETAMINOPHEN 1 TAB: 5; 325 TABLET ORAL at 03:23

## 2018-02-23 RX ADMIN — PIPERACILLIN SODIUM AND TAZOBACTAM SODIUM 1 MLS/HR: 3; .375 INJECTION, POWDER, LYOPHILIZED, FOR SOLUTION INTRAVENOUS at 18:47

## 2018-02-23 RX ADMIN — PIPERACILLIN SODIUM AND TAZOBACTAM SODIUM 1 MLS/HR: 3; .375 INJECTION, POWDER, LYOPHILIZED, FOR SOLUTION INTRAVENOUS at 14:28

## 2018-02-23 RX ADMIN — TAMSULOSIN HYDROCHLORIDE 1 MG: 0.4 CAPSULE ORAL at 20:07

## 2018-02-23 RX ADMIN — ATENOLOL 1 MG: 25 TABLET ORAL at 09:05

## 2018-02-23 RX ADMIN — IPRATROPIUM BROMIDE AND ALBUTEROL SULFATE 1 ML: .5; 3 SOLUTION RESPIRATORY (INHALATION) at 07:40

## 2018-02-23 RX ADMIN — OXYCODONE HYDROCHLORIDE AND ACETAMINOPHEN 1 TAB: 5; 325 TABLET ORAL at 09:04

## 2018-02-23 RX ADMIN — IPRATROPIUM BROMIDE AND ALBUTEROL SULFATE 1 ML: .5; 3 SOLUTION RESPIRATORY (INHALATION) at 11:34

## 2018-02-23 RX ADMIN — IPRATROPIUM BROMIDE AND ALBUTEROL SULFATE 1 ML: .5; 3 SOLUTION RESPIRATORY (INHALATION) at 19:30

## 2018-02-23 RX ADMIN — PIPERACILLIN SODIUM AND TAZOBACTAM SODIUM 1 MLS/HR: 3; .375 INJECTION, POWDER, LYOPHILIZED, FOR SOLUTION INTRAVENOUS at 00:26

## 2018-02-23 RX ADMIN — POTASSIUM CHLORIDE 1 MEQ: 1500 TABLET, EXTENDED RELEASE ORAL at 09:05

## 2018-02-23 RX ADMIN — OXYCODONE HYDROCHLORIDE AND ACETAMINOPHEN 1 TAB: 5; 325 TABLET ORAL at 17:18

## 2018-02-23 RX ADMIN — VANCOMYCIN HYDROCHLORIDE 1 MLS/HR: 1 INJECTION, POWDER, FOR SOLUTION INTRAVENOUS at 17:15

## 2018-02-23 RX ADMIN — VANCOMYCIN HYDROCHLORIDE 1 EACH: 1 INJECTION, POWDER, LYOPHILIZED, FOR SOLUTION INTRAVENOUS at 15:33

## 2018-02-23 RX ADMIN — Medication 1 CAP: at 20:07

## 2018-02-23 RX ADMIN — PANCRELIPASE LIPASE, PANCRELIPASE PROTEASE, PANCRELIPASE AMYLASE 1 CAP: 10000; 32000; 42000 CAPSULE, DELAYED RELEASE ORAL at 17:15

## 2018-02-23 RX ADMIN — PANTOPRAZOLE SODIUM 1 MG: 40 TABLET, DELAYED RELEASE ORAL at 09:06

## 2018-02-23 RX ADMIN — FENTANYL 1 PATCH: 50 PATCH, EXTENDED RELEASE TRANSDERMAL at 09:04

## 2018-02-24 LAB
ADD MAN DIFF?: NO
ALBUMIN SERPL-MCNC: 2.8 G/DL (ref 3.4–5)
ALBUMIN/GLOB SERPL: 0.9 {RATIO} (ref 1–1.7)
ALP SERPL-CCNC: 93 U/L (ref 46–116)
ALT (SGPT): 14 U/L (ref 14–59)
ANION GAP SERPL CALC-SCNC: 10 MMOL/L (ref 6–14)
AST SERPL-CCNC: 14 U/L (ref 15–37)
BASO #: 0 X10^3/UL (ref 0–0.2)
BASO %: 1 % (ref 0–3)
BLOOD UREA NITROGEN: 22 MG/DL (ref 7–20)
BUN/CREAT SERPL: 16 (ref 6–20)
CALCIUM: 8.5 MG/DL (ref 8.5–10.1)
CHLORIDE: 105 MMOL/L (ref 98–107)
CO2 SERPL-SCNC: 25 MMOL/L (ref 21–32)
CREAT SERPL-MCNC: 1.4 MG/DL (ref 0.6–1)
EOS #: 0.2 X10^3/UL (ref 0–0.7)
EOS %: 3 % (ref 0–3)
GFR SERPLBLD BASED ON 1.73 SQ M-ARVRAT: 36.2 ML/MIN
GLOBULIN SER-MCNC: 3.2 G/DL (ref 2.2–3.8)
GLUCOSE SERPL-MCNC: 99 MG/DL (ref 70–99)
HCG SERPL-ACNC: 5 X10^3/UL (ref 4–11)
HEMATOCRIT: 25.7 % (ref 36–47)
HEMOGLOBIN: 8.6 G/DL (ref 12–15.5)
LYMPH #: 0.4 X10^3/UL (ref 1–4.8)
LYMPH %: 9 % (ref 24–48)
MEAN CORPUSCULAR HEMOGLOBIN: 33 PG (ref 25–35)
MEAN CORPUSCULAR HGB CONC: 33 G/DL (ref 31–37)
MEAN CORPUSCULAR VOLUME: 99 FL (ref 79–100)
MONO #: 0.6 X10^3/UL (ref 0–1.1)
MONO %: 11 % (ref 0–9)
NEUT #: 3.8 X10^3UL (ref 1.8–7.7)
NEUT %: 76 % (ref 31–73)
PLATELET COUNT: 179 X10^3/UL (ref 140–400)
POTASSIUM SERPL-SCNC: 3.8 MMOL/L (ref 3.5–5.1)
RED BLOOD COUNT: 2.6 X10^6/UL (ref 3.5–5.4)
RED CELL DISTRIBUTION WIDTH: 15.7 % (ref 11.5–14.5)
SODIUM: 140 MMOL/L (ref 136–145)
TOTAL BILIRUBIN: 0.9 MG/DL (ref 0.2–1)
TOTAL PROTEIN: 6 G/DL (ref 6.4–8.2)

## 2018-02-24 RX ADMIN — Medication 1 CAP: at 21:26

## 2018-02-24 RX ADMIN — IPRATROPIUM BROMIDE AND ALBUTEROL SULFATE 1 ML: .5; 3 SOLUTION RESPIRATORY (INHALATION) at 06:59

## 2018-02-24 RX ADMIN — PANCRELIPASE LIPASE, PANCRELIPASE PROTEASE, PANCRELIPASE AMYLASE 1 CAP: 10000; 32000; 42000 CAPSULE, DELAYED RELEASE ORAL at 12:04

## 2018-02-24 RX ADMIN — TAMSULOSIN HYDROCHLORIDE 1 MG: 0.4 CAPSULE ORAL at 21:25

## 2018-02-24 RX ADMIN — NITROGLYCERIN 1 MG: 0.4 TABLET SUBLINGUAL at 19:54

## 2018-02-24 RX ADMIN — OXYCODONE HYDROCHLORIDE AND ACETAMINOPHEN 1 TAB: 5; 325 TABLET ORAL at 05:54

## 2018-02-24 RX ADMIN — FENTANYL 1 PATCH: 50 PATCH, EXTENDED RELEASE TRANSDERMAL at 12:05

## 2018-02-24 RX ADMIN — ATENOLOL 1 MG: 25 TABLET ORAL at 10:31

## 2018-02-24 RX ADMIN — POTASSIUM CHLORIDE 1 MEQ: 1500 TABLET, EXTENDED RELEASE ORAL at 21:25

## 2018-02-24 RX ADMIN — Medication 1 CAP: at 10:29

## 2018-02-24 RX ADMIN — OXYCODONE HYDROCHLORIDE AND ACETAMINOPHEN 1 TAB: 5; 325 TABLET ORAL at 17:56

## 2018-02-24 RX ADMIN — IPRATROPIUM BROMIDE AND ALBUTEROL SULFATE 1 ML: .5; 3 SOLUTION RESPIRATORY (INHALATION) at 15:00

## 2018-02-24 RX ADMIN — APIXABAN 1 MG: 2.5 TABLET, FILM COATED ORAL at 21:26

## 2018-02-24 RX ADMIN — LIDOCAINE 1 PATCH: 50 PATCH CUTANEOUS at 10:21

## 2018-02-24 RX ADMIN — NITROGLYCERIN 1 MG: 0.4 TABLET SUBLINGUAL at 16:30

## 2018-02-24 RX ADMIN — PIPERACILLIN SODIUM AND TAZOBACTAM SODIUM 1 MLS/HR: 3; .375 INJECTION, POWDER, LYOPHILIZED, FOR SOLUTION INTRAVENOUS at 17:56

## 2018-02-24 RX ADMIN — ISOSORBIDE MONONITRATE 1 MG: 30 TABLET, EXTENDED RELEASE ORAL at 10:31

## 2018-02-24 RX ADMIN — FENTANYL CITRATE 1 MCG: 50 INJECTION INTRAMUSCULAR; INTRAVENOUS at 21:23

## 2018-02-24 RX ADMIN — Medication 1 EACH: at 09:09

## 2018-02-24 RX ADMIN — PANCRELIPASE LIPASE, PANCRELIPASE PROTEASE, PANCRELIPASE AMYLASE 1 CAP: 10000; 32000; 42000 CAPSULE, DELAYED RELEASE ORAL at 16:36

## 2018-02-24 RX ADMIN — OXYCODONE HYDROCHLORIDE AND ACETAMINOPHEN 1 TAB: 5; 325 TABLET ORAL at 12:04

## 2018-02-24 RX ADMIN — NITROGLYCERIN 1 MG: 0.4 TABLET SUBLINGUAL at 18:04

## 2018-02-24 RX ADMIN — VANCOMYCIN HYDROCHLORIDE 1 EACH: 1 INJECTION, POWDER, LYOPHILIZED, FOR SOLUTION INTRAVENOUS at 09:06

## 2018-02-24 RX ADMIN — VANCOMYCIN HYDROCHLORIDE 1 MLS/HR: 1 INJECTION, POWDER, FOR SOLUTION INTRAVENOUS at 14:23

## 2018-02-24 RX ADMIN — BENZONATATE 1 MG: 100 CAPSULE, LIQUID FILLED ORAL at 14:22

## 2018-02-24 RX ADMIN — POTASSIUM CHLORIDE 1 MEQ: 1500 TABLET, EXTENDED RELEASE ORAL at 10:29

## 2018-02-24 RX ADMIN — PANCRELIPASE LIPASE, PANCRELIPASE PROTEASE, PANCRELIPASE AMYLASE 1 CAP: 10000; 32000; 42000 CAPSULE, DELAYED RELEASE ORAL at 10:29

## 2018-02-24 RX ADMIN — PIPERACILLIN SODIUM AND TAZOBACTAM SODIUM 1 MLS/HR: 3; .375 INJECTION, POWDER, LYOPHILIZED, FOR SOLUTION INTRAVENOUS at 12:05

## 2018-02-24 RX ADMIN — IPRATROPIUM BROMIDE AND ALBUTEROL SULFATE 1 ML: .5; 3 SOLUTION RESPIRATORY (INHALATION) at 19:05

## 2018-02-24 RX ADMIN — ATENOLOL 1 MG: 25 TABLET ORAL at 21:26

## 2018-02-24 RX ADMIN — PIPERACILLIN SODIUM AND TAZOBACTAM SODIUM 1 MLS/HR: 3; .375 INJECTION, POWDER, LYOPHILIZED, FOR SOLUTION INTRAVENOUS at 05:54

## 2018-02-24 RX ADMIN — APIXABAN 1 MG: 2.5 TABLET, FILM COATED ORAL at 10:30

## 2018-02-24 RX ADMIN — PANTOPRAZOLE SODIUM 1 MG: 40 TABLET, DELAYED RELEASE ORAL at 05:55

## 2018-02-24 RX ADMIN — ONDANSETRON 1 MG: 4 TABLET, ORALLY DISINTEGRATING ORAL at 10:20

## 2018-02-24 RX ADMIN — IPRATROPIUM BROMIDE AND ALBUTEROL SULFATE 1 ML: .5; 3 SOLUTION RESPIRATORY (INHALATION) at 10:57

## 2018-02-24 RX ADMIN — BENZONATATE 1 MG: 100 CAPSULE, LIQUID FILLED ORAL at 21:25

## 2018-02-25 RX ADMIN — POTASSIUM CHLORIDE 1 MEQ: 1500 TABLET, EXTENDED RELEASE ORAL at 20:02

## 2018-02-25 RX ADMIN — PANCRELIPASE LIPASE, PANCRELIPASE PROTEASE, PANCRELIPASE AMYLASE 1 CAP: 10000; 32000; 42000 CAPSULE, DELAYED RELEASE ORAL at 16:14

## 2018-02-25 RX ADMIN — APIXABAN 1 MG: 2.5 TABLET, FILM COATED ORAL at 20:02

## 2018-02-25 RX ADMIN — PIPERACILLIN SODIUM AND TAZOBACTAM SODIUM 1 MLS/HR: 3; .375 INJECTION, POWDER, LYOPHILIZED, FOR SOLUTION INTRAVENOUS at 18:04

## 2018-02-25 RX ADMIN — FENTANYL CITRATE 1 MCG: 50 INJECTION INTRAMUSCULAR; INTRAVENOUS at 16:15

## 2018-02-25 RX ADMIN — IPRATROPIUM BROMIDE AND ALBUTEROL SULFATE 1 ML: .5; 3 SOLUTION RESPIRATORY (INHALATION) at 06:57

## 2018-02-25 RX ADMIN — OXYCODONE HYDROCHLORIDE AND ACETAMINOPHEN 1 TAB: 5; 325 TABLET ORAL at 00:00

## 2018-02-25 RX ADMIN — VANCOMYCIN HYDROCHLORIDE 1 MLS/HR: 1 INJECTION, POWDER, FOR SOLUTION INTRAVENOUS at 14:19

## 2018-02-25 RX ADMIN — OXYCODONE HYDROCHLORIDE AND ACETAMINOPHEN 1 TAB: 5; 325 TABLET ORAL at 12:04

## 2018-02-25 RX ADMIN — PANCRELIPASE LIPASE, PANCRELIPASE PROTEASE, PANCRELIPASE AMYLASE 1 CAP: 10000; 32000; 42000 CAPSULE, DELAYED RELEASE ORAL at 12:04

## 2018-02-25 RX ADMIN — Medication 1 CAP: at 07:55

## 2018-02-25 RX ADMIN — IPRATROPIUM BROMIDE AND ALBUTEROL SULFATE 1 ML: .5; 3 SOLUTION RESPIRATORY (INHALATION) at 20:07

## 2018-02-25 RX ADMIN — LIDOCAINE 1 PATCH: 50 PATCH CUTANEOUS at 07:56

## 2018-02-25 RX ADMIN — PANCRELIPASE LIPASE, PANCRELIPASE PROTEASE, PANCRELIPASE AMYLASE 1 CAP: 10000; 32000; 42000 CAPSULE, DELAYED RELEASE ORAL at 07:54

## 2018-02-25 RX ADMIN — TAMSULOSIN HYDROCHLORIDE 1 MG: 0.4 CAPSULE ORAL at 20:03

## 2018-02-25 RX ADMIN — BENZONATATE 1 MG: 100 CAPSULE, LIQUID FILLED ORAL at 20:09

## 2018-02-25 RX ADMIN — PIPERACILLIN SODIUM AND TAZOBACTAM SODIUM 1 MLS/HR: 3; .375 INJECTION, POWDER, LYOPHILIZED, FOR SOLUTION INTRAVENOUS at 12:04

## 2018-02-25 RX ADMIN — BENZONATATE 1 MG: 100 CAPSULE, LIQUID FILLED ORAL at 14:19

## 2018-02-25 RX ADMIN — OXYCODONE HYDROCHLORIDE AND ACETAMINOPHEN 1 TAB: 5; 325 TABLET ORAL at 06:03

## 2018-02-25 RX ADMIN — ZOLPIDEM TARTRATE 1 MG: 5 TABLET ORAL at 20:03

## 2018-02-25 RX ADMIN — ISOSORBIDE MONONITRATE 1 MG: 30 TABLET, EXTENDED RELEASE ORAL at 07:55

## 2018-02-25 RX ADMIN — PIPERACILLIN SODIUM AND TAZOBACTAM SODIUM 1 MLS/HR: 3; .375 INJECTION, POWDER, LYOPHILIZED, FOR SOLUTION INTRAVENOUS at 00:02

## 2018-02-25 RX ADMIN — BENZONATATE 1 MG: 100 CAPSULE, LIQUID FILLED ORAL at 07:54

## 2018-02-25 RX ADMIN — PANTOPRAZOLE SODIUM 1 MG: 40 TABLET, DELAYED RELEASE ORAL at 07:54

## 2018-02-25 RX ADMIN — PIPERACILLIN SODIUM AND TAZOBACTAM SODIUM 1 MLS/HR: 3; .375 INJECTION, POWDER, LYOPHILIZED, FOR SOLUTION INTRAVENOUS at 06:03

## 2018-02-25 RX ADMIN — IPRATROPIUM BROMIDE AND ALBUTEROL SULFATE 1 ML: .5; 3 SOLUTION RESPIRATORY (INHALATION) at 11:19

## 2018-02-25 RX ADMIN — VANCOMYCIN HYDROCHLORIDE 1 EACH: 1 INJECTION, POWDER, LYOPHILIZED, FOR SOLUTION INTRAVENOUS at 10:01

## 2018-02-25 RX ADMIN — APIXABAN 1 MG: 2.5 TABLET, FILM COATED ORAL at 07:54

## 2018-02-25 RX ADMIN — ONDANSETRON 1 MG: 4 TABLET, ORALLY DISINTEGRATING ORAL at 18:40

## 2018-02-25 RX ADMIN — IPRATROPIUM BROMIDE AND ALBUTEROL SULFATE 1 ML: .5; 3 SOLUTION RESPIRATORY (INHALATION) at 15:16

## 2018-02-25 RX ADMIN — Medication 1 CAP: at 20:03

## 2018-02-25 RX ADMIN — ATENOLOL 1 MG: 25 TABLET ORAL at 07:54

## 2018-02-25 RX ADMIN — PIPERACILLIN SODIUM AND TAZOBACTAM SODIUM 1 MLS/HR: 3; .375 INJECTION, POWDER, LYOPHILIZED, FOR SOLUTION INTRAVENOUS at 23:50

## 2018-02-25 RX ADMIN — OXYCODONE HYDROCHLORIDE AND ACETAMINOPHEN 1 TAB: 5; 325 TABLET ORAL at 18:04

## 2018-02-25 RX ADMIN — Medication 1 EACH: at 14:07

## 2018-02-25 RX ADMIN — FENTANYL CITRATE 1 MCG: 50 INJECTION INTRAMUSCULAR; INTRAVENOUS at 07:56

## 2018-02-25 RX ADMIN — FENTANYL CITRATE 1 MCG: 50 INJECTION INTRAMUSCULAR; INTRAVENOUS at 23:53

## 2018-02-25 RX ADMIN — ONDANSETRON 1 MG: 4 TABLET, ORALLY DISINTEGRATING ORAL at 02:42

## 2018-02-25 RX ADMIN — POTASSIUM CHLORIDE 1 MEQ: 1500 TABLET, EXTENDED RELEASE ORAL at 07:54

## 2018-02-25 RX ADMIN — FENTANYL CITRATE 1 MCG: 50 INJECTION INTRAMUSCULAR; INTRAVENOUS at 02:42

## 2018-02-25 RX ADMIN — ATENOLOL 1 MG: 25 TABLET ORAL at 20:12

## 2018-02-26 LAB
ANION GAP SERPL CALC-SCNC: 12 MMOL/L (ref 6–14)
BLOOD UREA NITROGEN: 20 MG/DL (ref 7–20)
CALCIUM: 9 MG/DL (ref 8.5–10.1)
CHLORIDE: 106 MMOL/L (ref 98–107)
CO2 SERPL-SCNC: 23 MMOL/L (ref 21–32)
CREAT SERPL-MCNC: 1.4 MG/DL (ref 0.6–1)
GFR SERPLBLD BASED ON 1.73 SQ M-ARVRAT: 36.2 ML/MIN
GLUCOSE SERPL-MCNC: 99 MG/DL (ref 70–99)
HCG SERPL-ACNC: 5.3 X10^3/UL (ref 4–11)
HEMATOCRIT: 28.7 % (ref 36–47)
HEMOGLOBIN: 9.4 G/DL (ref 12–15.5)
MEAN CORPUSCULAR HEMOGLOBIN: 33 PG (ref 25–35)
MEAN CORPUSCULAR HGB CONC: 33 G/DL (ref 31–37)
MEAN CORPUSCULAR VOLUME: 101 FL (ref 79–100)
PLATELET COUNT: 215 X10^3/UL (ref 140–400)
POTASSIUM SERPL-SCNC: 4.8 MMOL/L (ref 3.5–5.1)
RED BLOOD COUNT: 2.85 X10^6/UL (ref 3.5–5.4)
RED CELL DISTRIBUTION WIDTH: 16.5 % (ref 11.5–14.5)
SODIUM: 141 MMOL/L (ref 136–145)

## 2018-02-26 RX ADMIN — FENTANYL CITRATE 1 MCG: 50 INJECTION INTRAMUSCULAR; INTRAVENOUS at 20:59

## 2018-02-26 RX ADMIN — PIPERACILLIN SODIUM AND TAZOBACTAM SODIUM 1 MLS/HR: 3; .375 INJECTION, POWDER, LYOPHILIZED, FOR SOLUTION INTRAVENOUS at 06:20

## 2018-02-26 RX ADMIN — Medication 1 EACH: at 13:26

## 2018-02-26 RX ADMIN — LIDOCAINE 1 PATCH: 50 PATCH CUTANEOUS at 11:46

## 2018-02-26 RX ADMIN — OXYCODONE HYDROCHLORIDE AND ACETAMINOPHEN 1 TAB: 5; 325 TABLET ORAL at 17:50

## 2018-02-26 RX ADMIN — VANCOMYCIN HYDROCHLORIDE 1 EACH: 1 INJECTION, POWDER, LYOPHILIZED, FOR SOLUTION INTRAVENOUS at 13:28

## 2018-02-26 RX ADMIN — POTASSIUM CHLORIDE 1 MEQ: 1500 TABLET, EXTENDED RELEASE ORAL at 11:33

## 2018-02-26 RX ADMIN — PANTOPRAZOLE SODIUM 1 MG: 40 TABLET, DELAYED RELEASE ORAL at 11:31

## 2018-02-26 RX ADMIN — ATENOLOL 1 MG: 25 TABLET ORAL at 11:32

## 2018-02-26 RX ADMIN — BENZONATATE 1 MG: 100 CAPSULE, LIQUID FILLED ORAL at 21:00

## 2018-02-26 RX ADMIN — PANCRELIPASE LIPASE, PANCRELIPASE PROTEASE, PANCRELIPASE AMYLASE 1 CAP: 10000; 32000; 42000 CAPSULE, DELAYED RELEASE ORAL at 08:00

## 2018-02-26 RX ADMIN — FUROSEMIDE 1 MG: 40 TABLET ORAL at 15:00

## 2018-02-26 RX ADMIN — Medication 1 CAP: at 11:33

## 2018-02-26 RX ADMIN — ISOSORBIDE MONONITRATE 1 MG: 30 TABLET, EXTENDED RELEASE ORAL at 11:33

## 2018-02-26 RX ADMIN — PIPERACILLIN SODIUM AND TAZOBACTAM SODIUM 1 MLS/HR: 3; .375 INJECTION, POWDER, LYOPHILIZED, FOR SOLUTION INTRAVENOUS at 18:21

## 2018-02-26 RX ADMIN — OXYCODONE HYDROCHLORIDE AND ACETAMINOPHEN 1 TAB: 5; 325 TABLET ORAL at 03:00

## 2018-02-26 RX ADMIN — Medication 1 CAP: at 20:59

## 2018-02-26 RX ADMIN — OXYCODONE HYDROCHLORIDE AND ACETAMINOPHEN 1 TAB: 5; 325 TABLET ORAL at 11:31

## 2018-02-26 RX ADMIN — VANCOMYCIN HYDROCHLORIDE 1 MLS/HR: 1 INJECTION, POWDER, FOR SOLUTION INTRAVENOUS at 15:01

## 2018-02-26 RX ADMIN — ZOLPIDEM TARTRATE 1 MG: 5 TABLET ORAL at 20:59

## 2018-02-26 RX ADMIN — PIPERACILLIN SODIUM AND TAZOBACTAM SODIUM 1 MLS/HR: 3; .375 INJECTION, POWDER, LYOPHILIZED, FOR SOLUTION INTRAVENOUS at 11:34

## 2018-02-26 RX ADMIN — PANCRELIPASE LIPASE, PANCRELIPASE PROTEASE, PANCRELIPASE AMYLASE 1 CAP: 10000; 32000; 42000 CAPSULE, DELAYED RELEASE ORAL at 17:00

## 2018-02-26 RX ADMIN — IPRATROPIUM BROMIDE AND ALBUTEROL SULFATE 1 ML: .5; 3 SOLUTION RESPIRATORY (INHALATION) at 15:33

## 2018-02-26 RX ADMIN — IPRATROPIUM BROMIDE AND ALBUTEROL SULFATE 1 ML: .5; 3 SOLUTION RESPIRATORY (INHALATION) at 20:43

## 2018-02-26 RX ADMIN — PANCRELIPASE LIPASE, PANCRELIPASE PROTEASE, PANCRELIPASE AMYLASE 1 CAP: 10000; 32000; 42000 CAPSULE, DELAYED RELEASE ORAL at 11:31

## 2018-02-26 RX ADMIN — APIXABAN 1 MG: 2.5 TABLET, FILM COATED ORAL at 21:00

## 2018-02-26 RX ADMIN — IPRATROPIUM BROMIDE AND ALBUTEROL SULFATE 1 ML: .5; 3 SOLUTION RESPIRATORY (INHALATION) at 11:13

## 2018-02-26 RX ADMIN — BENZONATATE 1 MG: 100 CAPSULE, LIQUID FILLED ORAL at 15:00

## 2018-02-26 RX ADMIN — POTASSIUM CHLORIDE 1 MEQ: 1500 TABLET, EXTENDED RELEASE ORAL at 21:00

## 2018-02-26 RX ADMIN — APIXABAN 1 MG: 2.5 TABLET, FILM COATED ORAL at 11:31

## 2018-02-26 RX ADMIN — ATENOLOL 1 MG: 25 TABLET ORAL at 20:59

## 2018-02-26 RX ADMIN — TAMSULOSIN HYDROCHLORIDE 1 MG: 0.4 CAPSULE ORAL at 21:00

## 2018-02-26 RX ADMIN — BENZONATATE 1 MG: 100 CAPSULE, LIQUID FILLED ORAL at 11:31

## 2018-02-26 RX ADMIN — IPRATROPIUM BROMIDE AND ALBUTEROL SULFATE 1 ML: .5; 3 SOLUTION RESPIRATORY (INHALATION) at 07:16

## 2018-02-27 LAB
BASE EXCESS ABG: -18 MMOL/L (ref -3–3)
FIO2 ABG: 100
HCO3 ABG: 10 MMOL/L (ref 21–28)
PCO2 ABG: 33 MMHG (ref 35–46)
PH ABG: 7.11 (ref 7.35–7.45)
PO2 ABG: 64 MMHG (ref 65–108)
POC GLUCOSE: 69 MG/DL (ref 70–99)
SAT O2 ABG: 81 % (ref 92–99)

## 2018-02-27 RX ADMIN — FENTANYL 1 PATCH: 50 PATCH, EXTENDED RELEASE TRANSDERMAL at 09:26

## 2018-02-27 RX ADMIN — PIPERACILLIN SODIUM AND TAZOBACTAM SODIUM 1 MLS/HR: 3; .375 INJECTION, POWDER, LYOPHILIZED, FOR SOLUTION INTRAVENOUS at 06:29

## 2018-02-27 RX ADMIN — LIDOCAINE 1 PATCH: 50 PATCH CUTANEOUS at 09:25

## 2018-02-27 RX ADMIN — ONDANSETRON 1 MG: 4 TABLET, ORALLY DISINTEGRATING ORAL at 09:27

## 2018-02-27 RX ADMIN — FENTANYL CITRATE 1 MCG: 50 INJECTION INTRAMUSCULAR; INTRAVENOUS at 03:39

## 2018-02-27 RX ADMIN — ISOSORBIDE MONONITRATE 1 MG: 30 TABLET, EXTENDED RELEASE ORAL at 09:30

## 2018-02-27 RX ADMIN — PIPERACILLIN SODIUM AND TAZOBACTAM SODIUM 1 MLS/HR: 3; .375 INJECTION, POWDER, LYOPHILIZED, FOR SOLUTION INTRAVENOUS at 12:02

## 2018-02-27 RX ADMIN — FENTANYL CITRATE 1 MCG: 50 INJECTION INTRAMUSCULAR; INTRAVENOUS at 13:28

## 2018-02-27 RX ADMIN — FUROSEMIDE 1 MG: 10 INJECTION, SOLUTION INTRAMUSCULAR; INTRAVENOUS at 09:00

## 2018-02-27 RX ADMIN — PANCRELIPASE LIPASE, PANCRELIPASE PROTEASE, PANCRELIPASE AMYLASE 1 CAP: 10000; 32000; 42000 CAPSULE, DELAYED RELEASE ORAL at 12:02

## 2018-02-27 RX ADMIN — APIXABAN 1 MG: 2.5 TABLET, FILM COATED ORAL at 09:28

## 2018-02-27 RX ADMIN — IPRATROPIUM BROMIDE AND ALBUTEROL SULFATE 1 ML: .5; 3 SOLUTION RESPIRATORY (INHALATION) at 11:50

## 2018-02-27 RX ADMIN — FUROSEMIDE 1 MG: 40 TABLET ORAL at 09:28

## 2018-02-27 RX ADMIN — PIPERACILLIN SODIUM AND TAZOBACTAM SODIUM 1 MLS/HR: 3; .375 INJECTION, POWDER, LYOPHILIZED, FOR SOLUTION INTRAVENOUS at 00:35

## 2018-02-27 RX ADMIN — PANTOPRAZOLE SODIUM 1 MG: 40 TABLET, DELAYED RELEASE ORAL at 09:28

## 2018-02-27 RX ADMIN — POTASSIUM CHLORIDE 1 MEQ: 1500 TABLET, EXTENDED RELEASE ORAL at 09:28

## 2018-02-27 RX ADMIN — OXYCODONE HYDROCHLORIDE AND ACETAMINOPHEN 1 TAB: 5; 325 TABLET ORAL at 00:35

## 2018-02-27 RX ADMIN — ONDANSETRON 1 MG: 4 TABLET, ORALLY DISINTEGRATING ORAL at 03:39

## 2018-02-27 RX ADMIN — Medication 1 CAP: at 09:27

## 2018-02-27 RX ADMIN — BENZONATATE 1 MG: 100 CAPSULE, LIQUID FILLED ORAL at 09:27

## 2018-02-27 RX ADMIN — ATENOLOL 1 MG: 25 TABLET ORAL at 09:29

## 2018-02-27 RX ADMIN — OXYCODONE HYDROCHLORIDE AND ACETAMINOPHEN 1 TAB: 5; 325 TABLET ORAL at 09:26

## 2018-02-27 RX ADMIN — PANCRELIPASE LIPASE, PANCRELIPASE PROTEASE, PANCRELIPASE AMYLASE 1 CAP: 10000; 32000; 42000 CAPSULE, DELAYED RELEASE ORAL at 09:30

## 2018-02-27 RX ADMIN — IPRATROPIUM BROMIDE AND ALBUTEROL SULFATE 1 ML: .5; 3 SOLUTION RESPIRATORY (INHALATION) at 07:14

## 2019-07-10 NOTE — PDOC
Provider Note


Provider Note


RENAL F/U : MICHAEL





S : No new issues or c/o


     Resting.





O : 


VSS


Afebrile.


Neck : Supple


Lungs : Non labored.


CVS : RRR


Abd : Benign in appearance, without distention.








Labs reviewed.








A/P :


ARF/ATN


HTN w CKD


CKD II





Doing Ok


Cr stable.


ARF resolved.


Mild stage II CKD.


Follow from periphery and available as needed.





Supportive care.











ADELINE RODRIGUEZ MD Jun 11, 2017 23:39 Prophylactic measure Nutrition, metabolism, and development symptoms